# Patient Record
Sex: FEMALE | Race: WHITE | Employment: OTHER | URBAN - METROPOLITAN AREA
[De-identification: names, ages, dates, MRNs, and addresses within clinical notes are randomized per-mention and may not be internally consistent; named-entity substitution may affect disease eponyms.]

---

## 2017-01-24 ENCOUNTER — OFFICE VISIT (OUTPATIENT)
Dept: PRIMARY CARE CLINIC | Age: 67
End: 2017-01-24

## 2017-01-24 ENCOUNTER — CARE COORDINATOR VISIT (OUTPATIENT)
Dept: PRIMARY CARE CLINIC | Age: 67
End: 2017-01-24

## 2017-01-24 VITALS
RESPIRATION RATE: 16 BRPM | DIASTOLIC BLOOD PRESSURE: 60 MMHG | SYSTOLIC BLOOD PRESSURE: 126 MMHG | HEIGHT: 65 IN | WEIGHT: 149 LBS | HEART RATE: 87 BPM | OXYGEN SATURATION: 98 % | TEMPERATURE: 97.6 F | BODY MASS INDEX: 24.83 KG/M2

## 2017-01-24 DIAGNOSIS — B18.2 HEP C W/O COMA, CHRONIC (HCC): ICD-10-CM

## 2017-01-24 DIAGNOSIS — Z78.0 MENOPAUSE: ICD-10-CM

## 2017-01-24 DIAGNOSIS — E11.69 DM TYPE 2 WITH DIABETIC DYSLIPIDEMIA (HCC): Primary | ICD-10-CM

## 2017-01-24 DIAGNOSIS — D51.3 OTHER DIETARY VITAMIN B12 DEFICIENCY ANEMIA: ICD-10-CM

## 2017-01-24 DIAGNOSIS — R42 DIZZINESS: ICD-10-CM

## 2017-01-24 DIAGNOSIS — E78.5 DM TYPE 2 WITH DIABETIC DYSLIPIDEMIA (HCC): Primary | ICD-10-CM

## 2017-01-24 DIAGNOSIS — I10 HTN (HYPERTENSION), BENIGN: ICD-10-CM

## 2017-01-24 DIAGNOSIS — E78.2 MIXED HYPERLIPIDEMIA: ICD-10-CM

## 2017-01-24 DIAGNOSIS — E55.9 VITAMIN D DEFICIENCY: ICD-10-CM

## 2017-01-24 DIAGNOSIS — R41.3 MEMORY CHANGES: ICD-10-CM

## 2017-01-24 LAB
ALBUMIN SERPL-MCNC: 4.4 G/DL (ref 3.9–4.9)
ALP BLD-CCNC: 85 U/L (ref 40–130)
ALT SERPL-CCNC: 27 U/L (ref 0–33)
ANION GAP SERPL CALCULATED.3IONS-SCNC: 10 MEQ/L (ref 7–13)
AST SERPL-CCNC: 31 U/L (ref 0–35)
BILIRUB SERPL-MCNC: 0.2 MG/DL (ref 0–1.2)
BUN BLDV-MCNC: 15 MG/DL (ref 8–23)
CALCIUM SERPL-MCNC: 9.9 MG/DL (ref 8.6–10.2)
CHLORIDE BLD-SCNC: 100 MEQ/L (ref 98–107)
CO2: 28 MEQ/L (ref 22–29)
CREAT SERPL-MCNC: 0.44 MG/DL (ref 0.5–0.9)
GFR AFRICAN AMERICAN: >60
GFR NON-AFRICAN AMERICAN: >60
GLOBULIN: 1.8 G/DL (ref 2.3–3.5)
GLUCOSE BLD-MCNC: 142 MG/DL (ref 74–109)
GLUCOSE BLD-MCNC: 204 MG/DL
HEPATITIS C ANTIBODY INTERPRETATION: NORMAL
IRON SATURATION: 16 % (ref 11–46)
IRON: 76 UG/DL (ref 37–145)
POTASSIUM SERPL-SCNC: 4.1 MEQ/L (ref 3.5–5.1)
SODIUM BLD-SCNC: 138 MEQ/L (ref 132–144)
TOTAL IRON BINDING CAPACITY: 466 UG/DL (ref 178–450)
TOTAL PROTEIN: 6.2 G/DL (ref 6.4–8.1)
VITAMIN B-12: 1152 PG/ML (ref 211–946)
VITAMIN D 25-HYDROXY: 88.4 NG/ML (ref 30–100)

## 2017-01-24 PROCEDURE — 1090F PRES/ABSN URINE INCON ASSESS: CPT | Performed by: FAMILY MEDICINE

## 2017-01-24 PROCEDURE — G8399 PT W/DXA RESULTS DOCUMENT: HCPCS | Performed by: FAMILY MEDICINE

## 2017-01-24 PROCEDURE — 3017F COLORECTAL CA SCREEN DOC REV: CPT | Performed by: FAMILY MEDICINE

## 2017-01-24 PROCEDURE — 4040F PNEUMOC VAC/ADMIN/RCVD: CPT | Performed by: FAMILY MEDICINE

## 2017-01-24 PROCEDURE — 3014F SCREEN MAMMO DOC REV: CPT | Performed by: FAMILY MEDICINE

## 2017-01-24 PROCEDURE — 99214 OFFICE O/P EST MOD 30 MIN: CPT | Performed by: FAMILY MEDICINE

## 2017-01-24 PROCEDURE — G8427 DOCREV CUR MEDS BY ELIG CLIN: HCPCS | Performed by: FAMILY MEDICINE

## 2017-01-24 PROCEDURE — 82962 GLUCOSE BLOOD TEST: CPT | Performed by: FAMILY MEDICINE

## 2017-01-24 PROCEDURE — 1036F TOBACCO NON-USER: CPT | Performed by: FAMILY MEDICINE

## 2017-01-24 PROCEDURE — 1123F ACP DISCUSS/DSCN MKR DOCD: CPT | Performed by: FAMILY MEDICINE

## 2017-01-24 PROCEDURE — G8420 CALC BMI NORM PARAMETERS: HCPCS | Performed by: FAMILY MEDICINE

## 2017-01-24 PROCEDURE — G8484 FLU IMMUNIZE NO ADMIN: HCPCS | Performed by: FAMILY MEDICINE

## 2017-01-24 PROCEDURE — 3044F HG A1C LEVEL LT 7.0%: CPT | Performed by: FAMILY MEDICINE

## 2017-01-24 RX ORDER — DONEPEZIL HYDROCHLORIDE 5 MG/1
5 TABLET, FILM COATED ORAL NIGHTLY
Qty: 30 TABLET | Refills: 2 | Status: SHIPPED | OUTPATIENT
Start: 2017-01-24 | End: 2017-02-24

## 2017-01-24 ASSESSMENT — ENCOUNTER SYMPTOMS
ABDOMINAL PAIN: 0
BACK PAIN: 0

## 2017-02-04 ASSESSMENT — ENCOUNTER SYMPTOMS
CHEST TIGHTNESS: 0
SHORTNESS OF BREATH: 0
COUGH: 0

## 2017-02-24 ENCOUNTER — OFFICE VISIT (OUTPATIENT)
Dept: PRIMARY CARE CLINIC | Age: 67
End: 2017-02-24

## 2017-02-24 VITALS
BODY MASS INDEX: 24.36 KG/M2 | TEMPERATURE: 97.5 F | WEIGHT: 146.2 LBS | DIASTOLIC BLOOD PRESSURE: 64 MMHG | OXYGEN SATURATION: 95 % | SYSTOLIC BLOOD PRESSURE: 122 MMHG | HEIGHT: 65 IN | HEART RATE: 93 BPM | RESPIRATION RATE: 16 BRPM

## 2017-02-24 DIAGNOSIS — F34.1 DYSTHYMIA: ICD-10-CM

## 2017-02-24 DIAGNOSIS — E78.5 DM TYPE 2 WITH DIABETIC DYSLIPIDEMIA (HCC): ICD-10-CM

## 2017-02-24 DIAGNOSIS — R41.3 MEMORY CHANGES: ICD-10-CM

## 2017-02-24 DIAGNOSIS — I10 HTN (HYPERTENSION), BENIGN: Primary | ICD-10-CM

## 2017-02-24 DIAGNOSIS — E11.69 DM TYPE 2 WITH DIABETIC DYSLIPIDEMIA (HCC): ICD-10-CM

## 2017-02-24 LAB
BASOPHILS ABSOLUTE: 0.1 K/UL (ref 0–0.2)
BASOPHILS RELATIVE PERCENT: 1.1 %
EOSINOPHILS ABSOLUTE: 0.3 K/UL (ref 0–0.7)
EOSINOPHILS RELATIVE PERCENT: 2.9 %
HCT VFR BLD CALC: 38 % (ref 37–47)
HEMOGLOBIN: 12.3 G/DL (ref 12–16)
LYMPHOCYTES ABSOLUTE: 3 K/UL (ref 1–4.8)
LYMPHOCYTES RELATIVE PERCENT: 34.1 %
MCH RBC QN AUTO: 29.1 PG (ref 27–31.3)
MCHC RBC AUTO-ENTMCNC: 32.4 % (ref 33–37)
MCV RBC AUTO: 90 FL (ref 82–100)
MONOCYTES ABSOLUTE: 0.6 K/UL (ref 0.2–0.8)
MONOCYTES RELATIVE PERCENT: 6.5 %
NEUTROPHILS ABSOLUTE: 4.9 K/UL (ref 1.4–6.5)
NEUTROPHILS RELATIVE PERCENT: 55.4 %
PDW BLD-RTO: 14.1 % (ref 11.5–14.5)
PLATELET # BLD: 277 K/UL (ref 130–400)
RBC # BLD: 4.22 M/UL (ref 4.2–5.4)
WBC # BLD: 8.9 K/UL (ref 4.8–10.8)

## 2017-02-24 PROCEDURE — 4040F PNEUMOC VAC/ADMIN/RCVD: CPT | Performed by: FAMILY MEDICINE

## 2017-02-24 PROCEDURE — G8420 CALC BMI NORM PARAMETERS: HCPCS | Performed by: FAMILY MEDICINE

## 2017-02-24 PROCEDURE — 1123F ACP DISCUSS/DSCN MKR DOCD: CPT | Performed by: FAMILY MEDICINE

## 2017-02-24 PROCEDURE — 99214 OFFICE O/P EST MOD 30 MIN: CPT | Performed by: FAMILY MEDICINE

## 2017-02-24 PROCEDURE — 1090F PRES/ABSN URINE INCON ASSESS: CPT | Performed by: FAMILY MEDICINE

## 2017-02-24 PROCEDURE — G8427 DOCREV CUR MEDS BY ELIG CLIN: HCPCS | Performed by: FAMILY MEDICINE

## 2017-02-24 PROCEDURE — G8399 PT W/DXA RESULTS DOCUMENT: HCPCS | Performed by: FAMILY MEDICINE

## 2017-02-24 PROCEDURE — G8484 FLU IMMUNIZE NO ADMIN: HCPCS | Performed by: FAMILY MEDICINE

## 2017-02-24 PROCEDURE — 3017F COLORECTAL CA SCREEN DOC REV: CPT | Performed by: FAMILY MEDICINE

## 2017-02-24 PROCEDURE — 3014F SCREEN MAMMO DOC REV: CPT | Performed by: FAMILY MEDICINE

## 2017-02-24 PROCEDURE — 3044F HG A1C LEVEL LT 7.0%: CPT | Performed by: FAMILY MEDICINE

## 2017-02-24 PROCEDURE — 1036F TOBACCO NON-USER: CPT | Performed by: FAMILY MEDICINE

## 2017-02-24 RX ORDER — DONEPEZIL HYDROCHLORIDE 10 MG/1
10 TABLET, FILM COATED ORAL NIGHTLY
Qty: 30 TABLET | Refills: 1 | Status: SHIPPED | OUTPATIENT
Start: 2017-02-24 | End: 2017-03-10 | Stop reason: SDUPTHER

## 2017-02-24 ASSESSMENT — ENCOUNTER SYMPTOMS
ABDOMINAL PAIN: 0
SHORTNESS OF BREATH: 0
DIARRHEA: 0
CONSTIPATION: 0

## 2017-02-26 LAB — RPR: NORMAL

## 2017-03-02 ENCOUNTER — TELEPHONE (OUTPATIENT)
Dept: PRIMARY CARE CLINIC | Age: 67
End: 2017-03-02

## 2017-03-10 ENCOUNTER — OFFICE VISIT (OUTPATIENT)
Dept: PRIMARY CARE CLINIC | Age: 67
End: 2017-03-10

## 2017-03-10 ENCOUNTER — CARE COORDINATOR VISIT (OUTPATIENT)
Dept: CARE COORDINATION | Age: 67
End: 2017-03-10

## 2017-03-10 ENCOUNTER — OFFICE VISIT (OUTPATIENT)
Dept: BEHAVIORAL/MENTAL HEALTH | Age: 67
End: 2017-03-10

## 2017-03-10 VITALS
BODY MASS INDEX: 23.46 KG/M2 | DIASTOLIC BLOOD PRESSURE: 74 MMHG | OXYGEN SATURATION: 96 % | RESPIRATION RATE: 14 BRPM | WEIGHT: 146 LBS | HEART RATE: 97 BPM | TEMPERATURE: 98.1 F | SYSTOLIC BLOOD PRESSURE: 130 MMHG | HEIGHT: 66 IN

## 2017-03-10 DIAGNOSIS — D51.3 OTHER DIETARY VITAMIN B12 DEFICIENCY ANEMIA: Primary | ICD-10-CM

## 2017-03-10 DIAGNOSIS — Z78.0 MENOPAUSE: ICD-10-CM

## 2017-03-10 DIAGNOSIS — E78.2 MIXED HYPERLIPIDEMIA: ICD-10-CM

## 2017-03-10 DIAGNOSIS — B35.1 ONYCHOMYCOSIS: ICD-10-CM

## 2017-03-10 DIAGNOSIS — E78.5 DM TYPE 2 WITH DIABETIC DYSLIPIDEMIA (HCC): ICD-10-CM

## 2017-03-10 DIAGNOSIS — M15.9 PRIMARY OSTEOARTHRITIS INVOLVING MULTIPLE JOINTS: ICD-10-CM

## 2017-03-10 DIAGNOSIS — F34.1 DYSTHYMIA: Primary | ICD-10-CM

## 2017-03-10 DIAGNOSIS — R41.3 MEMORY CHANGES: ICD-10-CM

## 2017-03-10 DIAGNOSIS — F32.9 REACTIVE DEPRESSION: ICD-10-CM

## 2017-03-10 DIAGNOSIS — E11.69 DM TYPE 2 WITH DIABETIC DYSLIPIDEMIA (HCC): ICD-10-CM

## 2017-03-10 DIAGNOSIS — I10 ESSENTIAL HYPERTENSION: ICD-10-CM

## 2017-03-10 DIAGNOSIS — K21.9 GASTROESOPHAGEAL REFLUX DISEASE WITHOUT ESOPHAGITIS: ICD-10-CM

## 2017-03-10 DIAGNOSIS — J01.01 ACUTE RECURRENT MAXILLARY SINUSITIS: ICD-10-CM

## 2017-03-10 DIAGNOSIS — B00.9 HSV-1 INFECTION: ICD-10-CM

## 2017-03-10 PROCEDURE — 3017F COLORECTAL CA SCREEN DOC REV: CPT | Performed by: FAMILY MEDICINE

## 2017-03-10 PROCEDURE — 1090F PRES/ABSN URINE INCON ASSESS: CPT | Performed by: FAMILY MEDICINE

## 2017-03-10 PROCEDURE — G8427 DOCREV CUR MEDS BY ELIG CLIN: HCPCS | Performed by: FAMILY MEDICINE

## 2017-03-10 PROCEDURE — G8420 CALC BMI NORM PARAMETERS: HCPCS | Performed by: FAMILY MEDICINE

## 2017-03-10 PROCEDURE — 90791 PSYCH DIAGNOSTIC EVALUATION: CPT | Performed by: PSYCHOLOGIST

## 2017-03-10 PROCEDURE — 1123F ACP DISCUSS/DSCN MKR DOCD: CPT | Performed by: FAMILY MEDICINE

## 2017-03-10 PROCEDURE — 99214 OFFICE O/P EST MOD 30 MIN: CPT | Performed by: FAMILY MEDICINE

## 2017-03-10 PROCEDURE — G8399 PT W/DXA RESULTS DOCUMENT: HCPCS | Performed by: FAMILY MEDICINE

## 2017-03-10 PROCEDURE — 3014F SCREEN MAMMO DOC REV: CPT | Performed by: FAMILY MEDICINE

## 2017-03-10 PROCEDURE — 4040F PNEUMOC VAC/ADMIN/RCVD: CPT | Performed by: FAMILY MEDICINE

## 2017-03-10 PROCEDURE — 1036F TOBACCO NON-USER: CPT | Performed by: FAMILY MEDICINE

## 2017-03-10 PROCEDURE — G8484 FLU IMMUNIZE NO ADMIN: HCPCS | Performed by: FAMILY MEDICINE

## 2017-03-10 PROCEDURE — 96372 THER/PROPH/DIAG INJ SC/IM: CPT | Performed by: FAMILY MEDICINE

## 2017-03-10 PROCEDURE — 3044F HG A1C LEVEL LT 7.0%: CPT | Performed by: FAMILY MEDICINE

## 2017-03-10 RX ORDER — OMEPRAZOLE 20 MG/1
CAPSULE, DELAYED RELEASE ORAL
Qty: 90 CAPSULE | Refills: 1 | Status: SHIPPED | OUTPATIENT
Start: 2017-03-10 | End: 2018-03-19

## 2017-03-10 RX ORDER — SIMVASTATIN 40 MG
TABLET ORAL
Qty: 90 TABLET | Refills: 1 | Status: SHIPPED | OUTPATIENT
Start: 2017-03-10 | End: 2017-08-24 | Stop reason: SDUPTHER

## 2017-03-10 RX ORDER — BUPROPION HYDROCHLORIDE 150 MG/1
TABLET, EXTENDED RELEASE ORAL
Qty: 180 TABLET | Refills: 1 | Status: SHIPPED | OUTPATIENT
Start: 2017-03-10 | End: 2017-05-31

## 2017-03-10 RX ORDER — CYANOCOBALAMIN 1000 UG/ML
1000 INJECTION INTRAMUSCULAR; SUBCUTANEOUS ONCE
Status: COMPLETED | OUTPATIENT
Start: 2017-03-10 | End: 2017-03-10

## 2017-03-10 RX ORDER — AMOXICILLIN 500 MG/1
TABLET, FILM COATED ORAL
Qty: 30 TABLET | Refills: 0 | Status: SHIPPED | OUTPATIENT
Start: 2017-03-10 | End: 2017-03-21 | Stop reason: SDUPTHER

## 2017-03-10 RX ORDER — DONEPEZIL HYDROCHLORIDE 10 MG/1
10 TABLET, FILM COATED ORAL NIGHTLY
Qty: 30 TABLET | Refills: 1 | Status: SHIPPED | OUTPATIENT
Start: 2017-03-10 | End: 2017-03-24

## 2017-03-10 RX ORDER — LISINOPRIL 20 MG/1
TABLET ORAL
Qty: 90 TABLET | Refills: 1 | Status: SHIPPED | OUTPATIENT
Start: 2017-03-10 | End: 2017-07-07

## 2017-03-10 RX ORDER — ACYCLOVIR 400 MG/1
TABLET ORAL
Qty: 90 TABLET | Refills: 1 | Status: SHIPPED | OUTPATIENT
Start: 2017-03-10 | End: 2017-08-05 | Stop reason: SDUPTHER

## 2017-03-10 RX ORDER — PIOGLITAZONEHYDROCHLORIDE 30 MG/1
TABLET ORAL
Qty: 90 TABLET | Refills: 1 | Status: SHIPPED | OUTPATIENT
Start: 2017-03-10 | End: 2017-07-09 | Stop reason: SDUPTHER

## 2017-03-10 RX ORDER — METFORMIN HYDROCHLORIDE 500 MG/1
TABLET, EXTENDED RELEASE ORAL
Qty: 180 TABLET | Refills: 1 | Status: SHIPPED | OUTPATIENT
Start: 2017-03-10 | End: 2017-07-07

## 2017-03-10 RX ORDER — FLUCONAZOLE 100 MG/1
TABLET ORAL
Qty: 90 TABLET | Refills: 1 | Status: SHIPPED | OUTPATIENT
Start: 2017-03-10 | End: 2017-08-24 | Stop reason: SDUPTHER

## 2017-03-10 RX ORDER — FLUTICASONE PROPIONATE 50 MCG
SPRAY, SUSPENSION (ML) NASAL
Qty: 3 BOTTLE | Refills: 0 | Status: SHIPPED | OUTPATIENT
Start: 2017-03-10 | End: 2017-11-02 | Stop reason: SDUPTHER

## 2017-03-10 RX ORDER — DICLOFENAC SODIUM 75 MG/1
TABLET, DELAYED RELEASE ORAL
Qty: 180 TABLET | Refills: 1 | Status: SHIPPED | OUTPATIENT
Start: 2017-03-10 | End: 2017-05-12

## 2017-03-10 RX ORDER — OCTISALATE, AVOBENZONE, HOMOSALATE, AND OCTOCRYLENE 29.4; 29.4; 49; 25.48 MG/ML; MG/ML; MG/ML; MG/ML
1 LOTION TOPICAL DAILY
Qty: 30 CAPSULE | Refills: 3 | Status: SHIPPED | OUTPATIENT
Start: 2017-03-10 | End: 2017-05-12

## 2017-03-10 RX ADMIN — CYANOCOBALAMIN 1000 MCG: 1000 INJECTION INTRAMUSCULAR; SUBCUTANEOUS at 15:20

## 2017-03-10 ASSESSMENT — ENCOUNTER SYMPTOMS
SINUS PRESSURE: 1
RHINORRHEA: 1
CONSTIPATION: 0
SHORTNESS OF BREATH: 0
VOMITING: 0
COUGH: 0
DIARRHEA: 0
NAUSEA: 0
ABDOMINAL PAIN: 0

## 2017-03-10 ASSESSMENT — PATIENT HEALTH QUESTIONNAIRE - PHQ9
10. IF YOU CHECKED OFF ANY PROBLEMS, HOW DIFFICULT HAVE THESE PROBLEMS MADE IT FOR YOU TO DO YOUR WORK, TAKE CARE OF THINGS AT HOME, OR GET ALONG WITH OTHER PEOPLE: 1
2. FEELING DOWN, DEPRESSED OR HOPELESS: 1
SUM OF ALL RESPONSES TO PHQ QUESTIONS 1-9: 8
3. TROUBLE FALLING OR STAYING ASLEEP: 0
5. POOR APPETITE OR OVEREATING: 1
4. FEELING TIRED OR HAVING LITTLE ENERGY: 3
7. TROUBLE CONCENTRATING ON THINGS, SUCH AS READING THE NEWSPAPER OR WATCHING TELEVISION: 1
SUM OF ALL RESPONSES TO PHQ9 QUESTIONS 1 & 2: 2
8. MOVING OR SPEAKING SO SLOWLY THAT OTHER PEOPLE COULD HAVE NOTICED. OR THE OPPOSITE, BEING SO FIGETY OR RESTLESS THAT YOU HAVE BEEN MOVING AROUND A LOT MORE THAN USUAL: 0
1. LITTLE INTEREST OR PLEASURE IN DOING THINGS: 1
6. FEELING BAD ABOUT YOURSELF - OR THAT YOU ARE A FAILURE OR HAVE LET YOURSELF OR YOUR FAMILY DOWN: 1
9. THOUGHTS THAT YOU WOULD BE BETTER OFF DEAD, OR OF HURTING YOURSELF: 0

## 2017-03-14 ENCOUNTER — TELEPHONE (OUTPATIENT)
Dept: INTERNAL MEDICINE | Age: 67
End: 2017-03-14

## 2017-03-17 ENCOUNTER — CARE COORDINATION (OUTPATIENT)
Dept: CARE COORDINATION | Age: 67
End: 2017-03-17

## 2017-03-21 DIAGNOSIS — B35.1 ONYCHOMYCOSIS: ICD-10-CM

## 2017-03-21 RX ORDER — AMOXICILLIN 500 MG/1
TABLET, FILM COATED ORAL
Qty: 30 TABLET | Refills: 0 | Status: SHIPPED | OUTPATIENT
Start: 2017-03-21 | End: 2017-05-12

## 2017-03-24 ENCOUNTER — OFFICE VISIT (OUTPATIENT)
Dept: PRIMARY CARE CLINIC | Age: 67
End: 2017-03-24

## 2017-03-24 VITALS
RESPIRATION RATE: 16 BRPM | OXYGEN SATURATION: 95 % | SYSTOLIC BLOOD PRESSURE: 118 MMHG | BODY MASS INDEX: 24.03 KG/M2 | WEIGHT: 144.2 LBS | DIASTOLIC BLOOD PRESSURE: 62 MMHG | TEMPERATURE: 98.2 F | HEIGHT: 65 IN | HEART RATE: 95 BPM

## 2017-03-24 DIAGNOSIS — F34.1 DYSTHYMIA: ICD-10-CM

## 2017-03-24 DIAGNOSIS — E11.69 DM TYPE 2 WITH DIABETIC DYSLIPIDEMIA (HCC): ICD-10-CM

## 2017-03-24 DIAGNOSIS — E78.5 DM TYPE 2 WITH DIABETIC DYSLIPIDEMIA (HCC): ICD-10-CM

## 2017-03-24 DIAGNOSIS — I10 HTN (HYPERTENSION), BENIGN: ICD-10-CM

## 2017-03-24 DIAGNOSIS — F02.83 DEMENTIA OF THE ALZHEIMER'S TYPE, WITH EARLY ONSET, WITH DEPRESSIVE MOOD (HCC): Primary | ICD-10-CM

## 2017-03-24 DIAGNOSIS — J30.1 ALLERGIC RHINITIS DUE TO POLLEN, UNSPECIFIED RHINITIS SEASONALITY: ICD-10-CM

## 2017-03-24 DIAGNOSIS — G30.0 DEMENTIA OF THE ALZHEIMER'S TYPE, WITH EARLY ONSET, WITH DEPRESSIVE MOOD (HCC): Primary | ICD-10-CM

## 2017-03-24 PROCEDURE — G8420 CALC BMI NORM PARAMETERS: HCPCS | Performed by: FAMILY MEDICINE

## 2017-03-24 PROCEDURE — 3017F COLORECTAL CA SCREEN DOC REV: CPT | Performed by: FAMILY MEDICINE

## 2017-03-24 PROCEDURE — G8427 DOCREV CUR MEDS BY ELIG CLIN: HCPCS | Performed by: FAMILY MEDICINE

## 2017-03-24 PROCEDURE — 4040F PNEUMOC VAC/ADMIN/RCVD: CPT | Performed by: FAMILY MEDICINE

## 2017-03-24 PROCEDURE — 1090F PRES/ABSN URINE INCON ASSESS: CPT | Performed by: FAMILY MEDICINE

## 2017-03-24 PROCEDURE — 1036F TOBACCO NON-USER: CPT | Performed by: FAMILY MEDICINE

## 2017-03-24 PROCEDURE — G8399 PT W/DXA RESULTS DOCUMENT: HCPCS | Performed by: FAMILY MEDICINE

## 2017-03-24 PROCEDURE — 3014F SCREEN MAMMO DOC REV: CPT | Performed by: FAMILY MEDICINE

## 2017-03-24 PROCEDURE — 3044F HG A1C LEVEL LT 7.0%: CPT | Performed by: FAMILY MEDICINE

## 2017-03-24 PROCEDURE — 1123F ACP DISCUSS/DSCN MKR DOCD: CPT | Performed by: FAMILY MEDICINE

## 2017-03-24 PROCEDURE — G8484 FLU IMMUNIZE NO ADMIN: HCPCS | Performed by: FAMILY MEDICINE

## 2017-03-24 PROCEDURE — 99214 OFFICE O/P EST MOD 30 MIN: CPT | Performed by: FAMILY MEDICINE

## 2017-03-24 ASSESSMENT — ENCOUNTER SYMPTOMS: BLURRED VISION: 0

## 2017-03-31 ENCOUNTER — TELEPHONE (OUTPATIENT)
Dept: PRIMARY CARE CLINIC | Age: 67
End: 2017-03-31

## 2017-03-31 ENCOUNTER — CARE COORDINATION (OUTPATIENT)
Dept: CARE COORDINATION | Age: 67
End: 2017-03-31

## 2017-04-01 ENCOUNTER — OFFICE VISIT (OUTPATIENT)
Dept: PRIMARY CARE CLINIC | Age: 67
End: 2017-04-01

## 2017-04-01 VITALS
WEIGHT: 142 LBS | RESPIRATION RATE: 16 BRPM | HEIGHT: 65 IN | DIASTOLIC BLOOD PRESSURE: 62 MMHG | OXYGEN SATURATION: 97 % | HEART RATE: 105 BPM | BODY MASS INDEX: 23.66 KG/M2 | TEMPERATURE: 98.1 F | SYSTOLIC BLOOD PRESSURE: 128 MMHG

## 2017-04-01 DIAGNOSIS — A05.9 FOOD POISONING: ICD-10-CM

## 2017-04-01 DIAGNOSIS — R11.2 NAUSEA AND VOMITING, INTRACTABILITY OF VOMITING NOT SPECIFIED, UNSPECIFIED VOMITING TYPE: Primary | ICD-10-CM

## 2017-04-01 PROCEDURE — 99213 OFFICE O/P EST LOW 20 MIN: CPT | Performed by: INTERNAL MEDICINE

## 2017-04-01 PROCEDURE — G8420 CALC BMI NORM PARAMETERS: HCPCS | Performed by: INTERNAL MEDICINE

## 2017-04-01 PROCEDURE — 1123F ACP DISCUSS/DSCN MKR DOCD: CPT | Performed by: INTERNAL MEDICINE

## 2017-04-01 PROCEDURE — 1090F PRES/ABSN URINE INCON ASSESS: CPT | Performed by: INTERNAL MEDICINE

## 2017-04-01 PROCEDURE — 4040F PNEUMOC VAC/ADMIN/RCVD: CPT | Performed by: INTERNAL MEDICINE

## 2017-04-01 PROCEDURE — G8399 PT W/DXA RESULTS DOCUMENT: HCPCS | Performed by: INTERNAL MEDICINE

## 2017-04-01 PROCEDURE — 1036F TOBACCO NON-USER: CPT | Performed by: INTERNAL MEDICINE

## 2017-04-01 PROCEDURE — G8427 DOCREV CUR MEDS BY ELIG CLIN: HCPCS | Performed by: INTERNAL MEDICINE

## 2017-04-01 PROCEDURE — 3017F COLORECTAL CA SCREEN DOC REV: CPT | Performed by: INTERNAL MEDICINE

## 2017-04-01 PROCEDURE — 3014F SCREEN MAMMO DOC REV: CPT | Performed by: INTERNAL MEDICINE

## 2017-04-01 RX ORDER — ONDANSETRON 4 MG/1
4 TABLET, ORALLY DISINTEGRATING ORAL EVERY 8 HOURS PRN
Qty: 12 TABLET | Refills: 1 | Status: SHIPPED | OUTPATIENT
Start: 2017-04-01 | End: 2017-07-07

## 2017-04-01 RX ORDER — ONDANSETRON 4 MG/1
4 TABLET, ORALLY DISINTEGRATING ORAL EVERY 8 HOURS PRN
Qty: 12 TABLET | Refills: 1 | Status: SHIPPED | OUTPATIENT
Start: 2017-04-01 | End: 2017-04-01 | Stop reason: SDUPTHER

## 2017-04-02 PROBLEM — G30.0: Status: ACTIVE | Noted: 2017-04-02

## 2017-04-02 PROBLEM — F02.83: Status: ACTIVE | Noted: 2017-04-02

## 2017-04-02 ASSESSMENT — ENCOUNTER SYMPTOMS
FACIAL SWELLING: 0
BLOOD IN STOOL: 0
ABDOMINAL DISTENTION: 0
APNEA: 0
CHOKING: 0
SHORTNESS OF BREATH: 0
CHEST TIGHTNESS: 0
VOMITING: 1
DIARRHEA: 1
NAUSEA: 1
PHOTOPHOBIA: 0

## 2017-04-21 ENCOUNTER — CARE COORDINATOR VISIT (OUTPATIENT)
Dept: CARE COORDINATION | Age: 67
End: 2017-04-21

## 2017-04-21 ENCOUNTER — OFFICE VISIT (OUTPATIENT)
Dept: PRIMARY CARE CLINIC | Age: 67
End: 2017-04-21

## 2017-04-21 VITALS
HEART RATE: 96 BPM | BODY MASS INDEX: 23.97 KG/M2 | WEIGHT: 143.9 LBS | TEMPERATURE: 97.2 F | RESPIRATION RATE: 14 BRPM | SYSTOLIC BLOOD PRESSURE: 104 MMHG | DIASTOLIC BLOOD PRESSURE: 64 MMHG | HEIGHT: 65 IN

## 2017-04-21 DIAGNOSIS — I10 HTN (HYPERTENSION), BENIGN: Primary | ICD-10-CM

## 2017-04-21 DIAGNOSIS — Z78.0 MENOPAUSE: ICD-10-CM

## 2017-04-21 DIAGNOSIS — G30.0 DEMENTIA OF THE ALZHEIMER'S TYPE, WITH EARLY ONSET, WITH DEPRESSIVE MOOD (HCC): ICD-10-CM

## 2017-04-21 DIAGNOSIS — F02.83 DEMENTIA OF THE ALZHEIMER'S TYPE, WITH EARLY ONSET, WITH DEPRESSIVE MOOD (HCC): ICD-10-CM

## 2017-04-21 PROCEDURE — 1090F PRES/ABSN URINE INCON ASSESS: CPT | Performed by: FAMILY MEDICINE

## 2017-04-21 PROCEDURE — 4040F PNEUMOC VAC/ADMIN/RCVD: CPT | Performed by: FAMILY MEDICINE

## 2017-04-21 PROCEDURE — 1123F ACP DISCUSS/DSCN MKR DOCD: CPT | Performed by: FAMILY MEDICINE

## 2017-04-21 PROCEDURE — 99214 OFFICE O/P EST MOD 30 MIN: CPT | Performed by: FAMILY MEDICINE

## 2017-04-21 PROCEDURE — 1036F TOBACCO NON-USER: CPT | Performed by: FAMILY MEDICINE

## 2017-04-21 PROCEDURE — G8420 CALC BMI NORM PARAMETERS: HCPCS | Performed by: FAMILY MEDICINE

## 2017-04-21 PROCEDURE — 3017F COLORECTAL CA SCREEN DOC REV: CPT | Performed by: FAMILY MEDICINE

## 2017-04-21 PROCEDURE — 3014F SCREEN MAMMO DOC REV: CPT | Performed by: FAMILY MEDICINE

## 2017-04-21 PROCEDURE — G8427 DOCREV CUR MEDS BY ELIG CLIN: HCPCS | Performed by: FAMILY MEDICINE

## 2017-04-21 PROCEDURE — G8399 PT W/DXA RESULTS DOCUMENT: HCPCS | Performed by: FAMILY MEDICINE

## 2017-04-21 RX ORDER — DONEPEZIL HYDROCHLORIDE 23 MG/1
23 TABLET, FILM COATED ORAL NIGHTLY
Qty: 30 TABLET | Refills: 3 | Status: SHIPPED | OUTPATIENT
Start: 2017-04-21 | End: 2017-05-12

## 2017-04-21 ASSESSMENT — ENCOUNTER SYMPTOMS
WHEEZING: 0
BACK PAIN: 0
SORE THROAT: 0
DIARRHEA: 1
SHORTNESS OF BREATH: 0
SWOLLEN GLANDS: 0
CHANGE IN BOWEL HABIT: 0
COUGH: 0
NAUSEA: 0
VOMITING: 0
ABDOMINAL PAIN: 1
SINUS PRESSURE: 0
VISUAL CHANGE: 0
CONSTIPATION: 0
RESPIRATORY NEGATIVE: 1

## 2017-04-24 ENCOUNTER — CARE COORDINATION (OUTPATIENT)
Dept: CARE COORDINATION | Age: 67
End: 2017-04-24

## 2017-04-24 RX ORDER — DONEPEZIL HYDROCHLORIDE 10 MG/1
20 TABLET, FILM COATED ORAL NIGHTLY
Qty: 60 TABLET | Refills: 3 | Status: SHIPPED | OUTPATIENT
Start: 2017-04-24 | End: 2017-08-24 | Stop reason: SDUPTHER

## 2017-04-26 ENCOUNTER — TELEPHONE (OUTPATIENT)
Dept: PRIMARY CARE CLINIC | Age: 67
End: 2017-04-26

## 2017-04-26 RX ORDER — CIPROFLOXACIN 500 MG/1
TABLET, FILM COATED ORAL
Qty: 20 TABLET | Refills: 0 | Status: SHIPPED | OUTPATIENT
Start: 2017-04-26 | End: 2017-05-06

## 2017-05-12 ENCOUNTER — OFFICE VISIT (OUTPATIENT)
Dept: PRIMARY CARE CLINIC | Age: 67
End: 2017-05-12

## 2017-05-12 ENCOUNTER — CARE COORDINATOR VISIT (OUTPATIENT)
Dept: CARE COORDINATION | Age: 67
End: 2017-05-12

## 2017-05-12 VITALS
DIASTOLIC BLOOD PRESSURE: 70 MMHG | WEIGHT: 139.9 LBS | BODY MASS INDEX: 23.31 KG/M2 | HEART RATE: 74 BPM | SYSTOLIC BLOOD PRESSURE: 128 MMHG | HEIGHT: 65 IN | RESPIRATION RATE: 14 BRPM | TEMPERATURE: 97.6 F

## 2017-05-12 DIAGNOSIS — F34.1 DYSTHYMIA: ICD-10-CM

## 2017-05-12 DIAGNOSIS — E78.5 DM TYPE 2 WITH DIABETIC DYSLIPIDEMIA (HCC): ICD-10-CM

## 2017-05-12 DIAGNOSIS — E78.5 DM TYPE 2 WITH DIABETIC DYSLIPIDEMIA (HCC): Primary | ICD-10-CM

## 2017-05-12 DIAGNOSIS — R41.3 MEMORY CHANGES: ICD-10-CM

## 2017-05-12 DIAGNOSIS — I10 HTN (HYPERTENSION), BENIGN: ICD-10-CM

## 2017-05-12 DIAGNOSIS — E11.69 DM TYPE 2 WITH DIABETIC DYSLIPIDEMIA (HCC): ICD-10-CM

## 2017-05-12 DIAGNOSIS — E11.69 DM TYPE 2 WITH DIABETIC DYSLIPIDEMIA (HCC): Primary | ICD-10-CM

## 2017-05-12 DIAGNOSIS — M15.9 PRIMARY OSTEOARTHRITIS INVOLVING MULTIPLE JOINTS: ICD-10-CM

## 2017-05-12 DIAGNOSIS — J30.1 ALLERGIC RHINITIS DUE TO POLLEN, UNSPECIFIED RHINITIS SEASONALITY: ICD-10-CM

## 2017-05-12 LAB
CREATININE URINE: 71.9 MG/DL
MICROALBUMIN UR-MCNC: 15.2 MG/DL
MICROALBUMIN/CREAT UR-RTO: 211.4 MG/G (ref 0–30)

## 2017-05-12 PROCEDURE — 1036F TOBACCO NON-USER: CPT | Performed by: FAMILY MEDICINE

## 2017-05-12 PROCEDURE — 3014F SCREEN MAMMO DOC REV: CPT | Performed by: FAMILY MEDICINE

## 2017-05-12 PROCEDURE — 3044F HG A1C LEVEL LT 7.0%: CPT | Performed by: FAMILY MEDICINE

## 2017-05-12 PROCEDURE — 1123F ACP DISCUSS/DSCN MKR DOCD: CPT | Performed by: FAMILY MEDICINE

## 2017-05-12 PROCEDURE — 4040F PNEUMOC VAC/ADMIN/RCVD: CPT | Performed by: FAMILY MEDICINE

## 2017-05-12 PROCEDURE — G8399 PT W/DXA RESULTS DOCUMENT: HCPCS | Performed by: FAMILY MEDICINE

## 2017-05-12 PROCEDURE — 3017F COLORECTAL CA SCREEN DOC REV: CPT | Performed by: FAMILY MEDICINE

## 2017-05-12 PROCEDURE — G8420 CALC BMI NORM PARAMETERS: HCPCS | Performed by: FAMILY MEDICINE

## 2017-05-12 PROCEDURE — G8427 DOCREV CUR MEDS BY ELIG CLIN: HCPCS | Performed by: FAMILY MEDICINE

## 2017-05-12 PROCEDURE — 1090F PRES/ABSN URINE INCON ASSESS: CPT | Performed by: FAMILY MEDICINE

## 2017-05-12 PROCEDURE — 99214 OFFICE O/P EST MOD 30 MIN: CPT | Performed by: FAMILY MEDICINE

## 2017-05-12 RX ORDER — MEMANTINE HYDROCHLORIDE 7 MG/1
7 CAPSULE, EXTENDED RELEASE ORAL DAILY
Qty: 30 CAPSULE | Refills: 1 | Status: SHIPPED | OUTPATIENT
Start: 2017-05-12 | End: 2017-05-31

## 2017-05-12 RX ORDER — CELECOXIB 100 MG/1
100 CAPSULE ORAL 2 TIMES DAILY
Qty: 60 CAPSULE | Refills: 3
Start: 2017-05-12 | End: 2018-03-19

## 2017-05-12 ASSESSMENT — ENCOUNTER SYMPTOMS
CONSTIPATION: 0
ORTHOPNEA: 0
SHORTNESS OF BREATH: 0
COUGH: 0
RESPIRATORY NEGATIVE: 1
WHEEZING: 0
BACK PAIN: 0
VOMITING: 0
DIARRHEA: 0
SINUS PRESSURE: 0
SORE THROAT: 0
NAUSEA: 0
ABDOMINAL PAIN: 0
BLURRED VISION: 0

## 2017-05-26 ENCOUNTER — CARE COORDINATION (OUTPATIENT)
Dept: PRIMARY CARE CLINIC | Age: 67
End: 2017-05-26

## 2017-05-31 ENCOUNTER — OFFICE VISIT (OUTPATIENT)
Dept: PRIMARY CARE CLINIC | Age: 67
End: 2017-05-31

## 2017-05-31 ENCOUNTER — CARE COORDINATOR VISIT (OUTPATIENT)
Dept: PRIMARY CARE CLINIC | Age: 67
End: 2017-05-31

## 2017-05-31 VITALS
HEIGHT: 65 IN | TEMPERATURE: 98 F | SYSTOLIC BLOOD PRESSURE: 114 MMHG | DIASTOLIC BLOOD PRESSURE: 66 MMHG | RESPIRATION RATE: 16 BRPM | OXYGEN SATURATION: 97 % | BODY MASS INDEX: 23.16 KG/M2 | WEIGHT: 139 LBS | HEART RATE: 87 BPM

## 2017-05-31 DIAGNOSIS — E11.69 DM TYPE 2 WITH DIABETIC DYSLIPIDEMIA (HCC): ICD-10-CM

## 2017-05-31 DIAGNOSIS — E78.5 DM TYPE 2 WITH DIABETIC DYSLIPIDEMIA (HCC): ICD-10-CM

## 2017-05-31 DIAGNOSIS — F32.0 MILD SINGLE CURRENT EPISODE OF MAJOR DEPRESSIVE DISORDER (HCC): ICD-10-CM

## 2017-05-31 DIAGNOSIS — I10 HTN (HYPERTENSION), BENIGN: Primary | ICD-10-CM

## 2017-05-31 DIAGNOSIS — R41.3 MEMORY CHANGES: ICD-10-CM

## 2017-05-31 DIAGNOSIS — F34.1 DYSTHYMIA: ICD-10-CM

## 2017-05-31 DIAGNOSIS — I65.23 CAROTID STENOSIS, BILATERAL: ICD-10-CM

## 2017-05-31 LAB — HBA1C MFR BLD: 6.7 % (ref 4.8–5.9)

## 2017-05-31 PROCEDURE — G8427 DOCREV CUR MEDS BY ELIG CLIN: HCPCS | Performed by: FAMILY MEDICINE

## 2017-05-31 PROCEDURE — 1036F TOBACCO NON-USER: CPT | Performed by: FAMILY MEDICINE

## 2017-05-31 PROCEDURE — G8420 CALC BMI NORM PARAMETERS: HCPCS | Performed by: FAMILY MEDICINE

## 2017-05-31 PROCEDURE — 1123F ACP DISCUSS/DSCN MKR DOCD: CPT | Performed by: FAMILY MEDICINE

## 2017-05-31 PROCEDURE — 4040F PNEUMOC VAC/ADMIN/RCVD: CPT | Performed by: FAMILY MEDICINE

## 2017-05-31 PROCEDURE — G8599 NO ASA/ANTIPLAT THER USE RNG: HCPCS | Performed by: FAMILY MEDICINE

## 2017-05-31 PROCEDURE — 3014F SCREEN MAMMO DOC REV: CPT | Performed by: FAMILY MEDICINE

## 2017-05-31 PROCEDURE — 3017F COLORECTAL CA SCREEN DOC REV: CPT | Performed by: FAMILY MEDICINE

## 2017-05-31 PROCEDURE — 99214 OFFICE O/P EST MOD 30 MIN: CPT | Performed by: FAMILY MEDICINE

## 2017-05-31 PROCEDURE — G8399 PT W/DXA RESULTS DOCUMENT: HCPCS | Performed by: FAMILY MEDICINE

## 2017-05-31 PROCEDURE — 1090F PRES/ABSN URINE INCON ASSESS: CPT | Performed by: FAMILY MEDICINE

## 2017-05-31 PROCEDURE — 3046F HEMOGLOBIN A1C LEVEL >9.0%: CPT | Performed by: FAMILY MEDICINE

## 2017-05-31 RX ORDER — BUPROPION HYDROCHLORIDE 200 MG/1
200 TABLET, EXTENDED RELEASE ORAL 2 TIMES DAILY
Qty: 60 TABLET | Refills: 3 | Status: SHIPPED | OUTPATIENT
Start: 2017-05-31 | End: 2017-06-15 | Stop reason: SDUPTHER

## 2017-05-31 ASSESSMENT — ENCOUNTER SYMPTOMS
SORE THROAT: 0
SHORTNESS OF BREATH: 0
ABDOMINAL PAIN: 0
COUGH: 0
SINUS PRESSURE: 0
BACK PAIN: 0
WHEEZING: 0
CONSTIPATION: 0
RESPIRATORY NEGATIVE: 1
NAUSEA: 0
VOMITING: 0
DIARRHEA: 0

## 2017-06-08 ENCOUNTER — OFFICE VISIT (OUTPATIENT)
Dept: BEHAVIORAL/MENTAL HEALTH | Age: 67
End: 2017-06-08

## 2017-06-08 DIAGNOSIS — F34.1 DYSTHYMIA: Primary | ICD-10-CM

## 2017-06-08 PROBLEM — F41.9 ANXIETY: Status: ACTIVE | Noted: 2017-06-08

## 2017-06-08 PROCEDURE — 90834 PSYTX W PT 45 MINUTES: CPT | Performed by: PSYCHOLOGIST

## 2017-06-08 ASSESSMENT — PATIENT HEALTH QUESTIONNAIRE - PHQ9
5. POOR APPETITE OR OVEREATING: 2
SUM OF ALL RESPONSES TO PHQ9 QUESTIONS 1 & 2: 3
9. THOUGHTS THAT YOU WOULD BE BETTER OFF DEAD, OR OF HURTING YOURSELF: 0
10. IF YOU CHECKED OFF ANY PROBLEMS, HOW DIFFICULT HAVE THESE PROBLEMS MADE IT FOR YOU TO DO YOUR WORK, TAKE CARE OF THINGS AT HOME, OR GET ALONG WITH OTHER PEOPLE: 1
7. TROUBLE CONCENTRATING ON THINGS, SUCH AS READING THE NEWSPAPER OR WATCHING TELEVISION: 0
6. FEELING BAD ABOUT YOURSELF - OR THAT YOU ARE A FAILURE OR HAVE LET YOURSELF OR YOUR FAMILY DOWN: 1
SUM OF ALL RESPONSES TO PHQ QUESTIONS 1-9: 7
8. MOVING OR SPEAKING SO SLOWLY THAT OTHER PEOPLE COULD HAVE NOTICED. OR THE OPPOSITE, BEING SO FIGETY OR RESTLESS THAT YOU HAVE BEEN MOVING AROUND A LOT MORE THAN USUAL: 0
2. FEELING DOWN, DEPRESSED OR HOPELESS: 1
1. LITTLE INTEREST OR PLEASURE IN DOING THINGS: 2
4. FEELING TIRED OR HAVING LITTLE ENERGY: 1

## 2017-06-14 ENCOUNTER — CARE COORDINATION (OUTPATIENT)
Dept: CARE COORDINATION | Age: 67
End: 2017-06-14

## 2017-06-15 ENCOUNTER — OFFICE VISIT (OUTPATIENT)
Dept: PRIMARY CARE CLINIC | Age: 67
End: 2017-06-15

## 2017-06-15 ENCOUNTER — CARE COORDINATOR VISIT (OUTPATIENT)
Dept: CARE COORDINATION | Age: 67
End: 2017-06-15

## 2017-06-15 VITALS
DIASTOLIC BLOOD PRESSURE: 62 MMHG | SYSTOLIC BLOOD PRESSURE: 128 MMHG | OXYGEN SATURATION: 96 % | RESPIRATION RATE: 16 BRPM | WEIGHT: 137 LBS | HEART RATE: 82 BPM | BODY MASS INDEX: 22.82 KG/M2 | HEIGHT: 65 IN | TEMPERATURE: 97.6 F

## 2017-06-15 DIAGNOSIS — F32.0 MILD SINGLE CURRENT EPISODE OF MAJOR DEPRESSIVE DISORDER (HCC): ICD-10-CM

## 2017-06-15 DIAGNOSIS — M15.9 PRIMARY OSTEOARTHRITIS INVOLVING MULTIPLE JOINTS: ICD-10-CM

## 2017-06-15 DIAGNOSIS — R41.3 MEMORY CHANGES: ICD-10-CM

## 2017-06-15 DIAGNOSIS — I10 HTN (HYPERTENSION), BENIGN: Primary | ICD-10-CM

## 2017-06-15 DIAGNOSIS — J30.1 ALLERGIC RHINITIS DUE TO POLLEN, UNSPECIFIED RHINITIS SEASONALITY: ICD-10-CM

## 2017-06-15 PROCEDURE — 99214 OFFICE O/P EST MOD 30 MIN: CPT | Performed by: FAMILY MEDICINE

## 2017-06-15 PROCEDURE — 1123F ACP DISCUSS/DSCN MKR DOCD: CPT | Performed by: FAMILY MEDICINE

## 2017-06-15 PROCEDURE — 3014F SCREEN MAMMO DOC REV: CPT | Performed by: FAMILY MEDICINE

## 2017-06-15 PROCEDURE — G8427 DOCREV CUR MEDS BY ELIG CLIN: HCPCS | Performed by: FAMILY MEDICINE

## 2017-06-15 PROCEDURE — 1090F PRES/ABSN URINE INCON ASSESS: CPT | Performed by: FAMILY MEDICINE

## 2017-06-15 PROCEDURE — G8599 NO ASA/ANTIPLAT THER USE RNG: HCPCS | Performed by: FAMILY MEDICINE

## 2017-06-15 PROCEDURE — 4040F PNEUMOC VAC/ADMIN/RCVD: CPT | Performed by: FAMILY MEDICINE

## 2017-06-15 PROCEDURE — G8399 PT W/DXA RESULTS DOCUMENT: HCPCS | Performed by: FAMILY MEDICINE

## 2017-06-15 PROCEDURE — G8420 CALC BMI NORM PARAMETERS: HCPCS | Performed by: FAMILY MEDICINE

## 2017-06-15 PROCEDURE — 3017F COLORECTAL CA SCREEN DOC REV: CPT | Performed by: FAMILY MEDICINE

## 2017-06-15 PROCEDURE — 1036F TOBACCO NON-USER: CPT | Performed by: FAMILY MEDICINE

## 2017-06-15 RX ORDER — CITALOPRAM 10 MG/1
10 TABLET ORAL DAILY
Qty: 30 TABLET | Refills: 0 | Status: SHIPPED | OUTPATIENT
Start: 2017-06-15 | End: 2017-07-07

## 2017-06-15 RX ORDER — DICLOFENAC SODIUM 75 MG/1
TABLET, DELAYED RELEASE ORAL
Qty: 180 TABLET | Refills: 1 | Status: SHIPPED | OUTPATIENT
Start: 2017-06-15 | End: 2017-11-02 | Stop reason: SDUPTHER

## 2017-06-15 RX ORDER — BUPROPION HYDROCHLORIDE 200 MG/1
200 TABLET, EXTENDED RELEASE ORAL 2 TIMES DAILY
Qty: 60 TABLET | Refills: 3 | Status: SHIPPED | OUTPATIENT
Start: 2017-06-15 | End: 2017-08-24 | Stop reason: DRUGHIGH

## 2017-06-15 ASSESSMENT — ENCOUNTER SYMPTOMS
NAUSEA: 0
SORE THROAT: 0
DIARRHEA: 1
ABDOMINAL PAIN: 0
WHEEZING: 0
SHORTNESS OF BREATH: 0
SINUS PRESSURE: 0
BACK PAIN: 0
COUGH: 0
CONSTIPATION: 0
VOMITING: 0
RESPIRATORY NEGATIVE: 1

## 2017-06-16 DIAGNOSIS — F32.0 MILD SINGLE CURRENT EPISODE OF MAJOR DEPRESSIVE DISORDER (HCC): ICD-10-CM

## 2017-06-16 DIAGNOSIS — F41.9 ANXIETY: Primary | ICD-10-CM

## 2017-06-19 ENCOUNTER — CARE COORDINATION (OUTPATIENT)
Dept: CARE COORDINATION | Age: 67
End: 2017-06-19

## 2017-06-22 ENCOUNTER — OFFICE VISIT (OUTPATIENT)
Dept: BEHAVIORAL/MENTAL HEALTH | Age: 67
End: 2017-06-22

## 2017-06-22 DIAGNOSIS — F34.1 DYSTHYMIA: Primary | ICD-10-CM

## 2017-06-22 PROCEDURE — 90832 PSYTX W PT 30 MINUTES: CPT | Performed by: PSYCHOLOGIST

## 2017-06-29 PROBLEM — M15.9 OSTEOARTHRITIS OF MULTIPLE JOINTS: Status: ACTIVE | Noted: 2017-06-29

## 2017-07-03 ENCOUNTER — CARE COORDINATION (OUTPATIENT)
Dept: CARE COORDINATION | Age: 67
End: 2017-07-03

## 2017-07-03 ENCOUNTER — TELEPHONE (OUTPATIENT)
Dept: PRIMARY CARE CLINIC | Age: 67
End: 2017-07-03

## 2017-07-06 ENCOUNTER — CARE COORDINATION (OUTPATIENT)
Dept: CARE COORDINATION | Age: 67
End: 2017-07-06

## 2017-07-07 ENCOUNTER — OFFICE VISIT (OUTPATIENT)
Dept: PRIMARY CARE CLINIC | Age: 67
End: 2017-07-07

## 2017-07-07 ENCOUNTER — CARE COORDINATOR VISIT (OUTPATIENT)
Dept: CARE COORDINATION | Age: 67
End: 2017-07-07

## 2017-07-07 VITALS
BODY MASS INDEX: 22.33 KG/M2 | HEART RATE: 74 BPM | OXYGEN SATURATION: 96 % | DIASTOLIC BLOOD PRESSURE: 54 MMHG | SYSTOLIC BLOOD PRESSURE: 100 MMHG | WEIGHT: 134 LBS | RESPIRATION RATE: 16 BRPM | HEIGHT: 65 IN | TEMPERATURE: 98.2 F

## 2017-07-07 DIAGNOSIS — K90.9 DIARRHEA DUE TO MALABSORPTION: ICD-10-CM

## 2017-07-07 DIAGNOSIS — I10 HTN (HYPERTENSION), BENIGN: Primary | ICD-10-CM

## 2017-07-07 DIAGNOSIS — E78.5 DM TYPE 2 WITH DIABETIC DYSLIPIDEMIA (HCC): ICD-10-CM

## 2017-07-07 DIAGNOSIS — G20 PARKINSON DISEASE (HCC): ICD-10-CM

## 2017-07-07 DIAGNOSIS — E11.69 DM TYPE 2 WITH DIABETIC DYSLIPIDEMIA (HCC): ICD-10-CM

## 2017-07-07 DIAGNOSIS — R19.7 DIARRHEA DUE TO MALABSORPTION: ICD-10-CM

## 2017-07-07 PROCEDURE — 1036F TOBACCO NON-USER: CPT | Performed by: FAMILY MEDICINE

## 2017-07-07 PROCEDURE — 3017F COLORECTAL CA SCREEN DOC REV: CPT | Performed by: FAMILY MEDICINE

## 2017-07-07 PROCEDURE — 3014F SCREEN MAMMO DOC REV: CPT | Performed by: FAMILY MEDICINE

## 2017-07-07 PROCEDURE — G8427 DOCREV CUR MEDS BY ELIG CLIN: HCPCS | Performed by: FAMILY MEDICINE

## 2017-07-07 PROCEDURE — 3046F HEMOGLOBIN A1C LEVEL >9.0%: CPT | Performed by: FAMILY MEDICINE

## 2017-07-07 PROCEDURE — G8399 PT W/DXA RESULTS DOCUMENT: HCPCS | Performed by: FAMILY MEDICINE

## 2017-07-07 PROCEDURE — G8420 CALC BMI NORM PARAMETERS: HCPCS | Performed by: FAMILY MEDICINE

## 2017-07-07 PROCEDURE — 1123F ACP DISCUSS/DSCN MKR DOCD: CPT | Performed by: FAMILY MEDICINE

## 2017-07-07 PROCEDURE — 4040F PNEUMOC VAC/ADMIN/RCVD: CPT | Performed by: FAMILY MEDICINE

## 2017-07-07 PROCEDURE — 1090F PRES/ABSN URINE INCON ASSESS: CPT | Performed by: FAMILY MEDICINE

## 2017-07-07 PROCEDURE — G8599 NO ASA/ANTIPLAT THER USE RNG: HCPCS | Performed by: FAMILY MEDICINE

## 2017-07-07 PROCEDURE — 99214 OFFICE O/P EST MOD 30 MIN: CPT | Performed by: FAMILY MEDICINE

## 2017-07-07 RX ORDER — LISINOPRIL 10 MG/1
TABLET ORAL
Qty: 30 TABLET | Refills: 3
Start: 2017-07-07 | End: 2017-07-28

## 2017-07-07 ASSESSMENT — ENCOUNTER SYMPTOMS
BLOATING: 0
BACK PAIN: 0
VOMITING: 0
ABDOMINAL PAIN: 0
COUGH: 0
DIARRHEA: 1
SINUS PRESSURE: 0
FLATUS: 0
SORE THROAT: 0
CONSTIPATION: 0
WHEEZING: 0
SHORTNESS OF BREATH: 0
NAUSEA: 0
RESPIRATORY NEGATIVE: 1

## 2017-07-08 ASSESSMENT — ENCOUNTER SYMPTOMS
ABDOMINAL DISTENTION: 0
RECTAL PAIN: 0
BLOOD IN STOOL: 0

## 2017-07-09 DIAGNOSIS — E11.69 DM TYPE 2 WITH DIABETIC DYSLIPIDEMIA (HCC): ICD-10-CM

## 2017-07-09 DIAGNOSIS — E78.5 DM TYPE 2 WITH DIABETIC DYSLIPIDEMIA (HCC): ICD-10-CM

## 2017-07-09 RX ORDER — PIOGLITAZONEHYDROCHLORIDE 30 MG/1
TABLET ORAL
Qty: 90 TABLET | Refills: 0 | Status: SHIPPED | OUTPATIENT
Start: 2017-07-09 | End: 2017-11-02 | Stop reason: SDUPTHER

## 2017-07-11 ENCOUNTER — OFFICE VISIT (OUTPATIENT)
Dept: BEHAVIORAL/MENTAL HEALTH | Age: 67
End: 2017-07-11

## 2017-07-11 DIAGNOSIS — F34.1 DYSTHYMIA: Primary | ICD-10-CM

## 2017-07-11 PROCEDURE — 90832 PSYTX W PT 30 MINUTES: CPT | Performed by: PSYCHOLOGIST

## 2017-07-11 ASSESSMENT — PATIENT HEALTH QUESTIONNAIRE - PHQ9
5. POOR APPETITE OR OVEREATING: 2
3. TROUBLE FALLING OR STAYING ASLEEP: 1
SUM OF ALL RESPONSES TO PHQ9 QUESTIONS 1 & 2: 2
7. TROUBLE CONCENTRATING ON THINGS, SUCH AS READING THE NEWSPAPER OR WATCHING TELEVISION: 2
SUM OF ALL RESPONSES TO PHQ QUESTIONS 1-9: 8
1. LITTLE INTEREST OR PLEASURE IN DOING THINGS: 1
9. THOUGHTS THAT YOU WOULD BE BETTER OFF DEAD, OR OF HURTING YOURSELF: 0
8. MOVING OR SPEAKING SO SLOWLY THAT OTHER PEOPLE COULD HAVE NOTICED. OR THE OPPOSITE, BEING SO FIGETY OR RESTLESS THAT YOU HAVE BEEN MOVING AROUND A LOT MORE THAN USUAL: 0
6. FEELING BAD ABOUT YOURSELF - OR THAT YOU ARE A FAILURE OR HAVE LET YOURSELF OR YOUR FAMILY DOWN: 0
10. IF YOU CHECKED OFF ANY PROBLEMS, HOW DIFFICULT HAVE THESE PROBLEMS MADE IT FOR YOU TO DO YOUR WORK, TAKE CARE OF THINGS AT HOME, OR GET ALONG WITH OTHER PEOPLE: 1
4. FEELING TIRED OR HAVING LITTLE ENERGY: 1
2. FEELING DOWN, DEPRESSED OR HOPELESS: 1

## 2017-07-18 ENCOUNTER — OFFICE VISIT (OUTPATIENT)
Dept: GASTROENTEROLOGY | Age: 67
End: 2017-07-18

## 2017-07-18 VITALS
BODY MASS INDEX: 22.47 KG/M2 | DIASTOLIC BLOOD PRESSURE: 68 MMHG | HEART RATE: 90 BPM | SYSTOLIC BLOOD PRESSURE: 105 MMHG | WEIGHT: 135 LBS

## 2017-07-18 DIAGNOSIS — R19.7 DIARRHEA, UNSPECIFIED TYPE: Primary | ICD-10-CM

## 2017-07-18 PROCEDURE — 1036F TOBACCO NON-USER: CPT | Performed by: INTERNAL MEDICINE

## 2017-07-18 PROCEDURE — 3017F COLORECTAL CA SCREEN DOC REV: CPT | Performed by: INTERNAL MEDICINE

## 2017-07-18 PROCEDURE — 4040F PNEUMOC VAC/ADMIN/RCVD: CPT | Performed by: INTERNAL MEDICINE

## 2017-07-18 PROCEDURE — G8599 NO ASA/ANTIPLAT THER USE RNG: HCPCS | Performed by: INTERNAL MEDICINE

## 2017-07-18 PROCEDURE — G8420 CALC BMI NORM PARAMETERS: HCPCS | Performed by: INTERNAL MEDICINE

## 2017-07-18 PROCEDURE — 1123F ACP DISCUSS/DSCN MKR DOCD: CPT | Performed by: INTERNAL MEDICINE

## 2017-07-18 PROCEDURE — 99203 OFFICE O/P NEW LOW 30 MIN: CPT | Performed by: INTERNAL MEDICINE

## 2017-07-18 PROCEDURE — G8427 DOCREV CUR MEDS BY ELIG CLIN: HCPCS | Performed by: INTERNAL MEDICINE

## 2017-07-18 PROCEDURE — 3014F SCREEN MAMMO DOC REV: CPT | Performed by: INTERNAL MEDICINE

## 2017-07-18 PROCEDURE — 1090F PRES/ABSN URINE INCON ASSESS: CPT | Performed by: INTERNAL MEDICINE

## 2017-07-18 PROCEDURE — G8399 PT W/DXA RESULTS DOCUMENT: HCPCS | Performed by: INTERNAL MEDICINE

## 2017-07-18 RX ORDER — LISINOPRIL 20 MG/1
TABLET ORAL
COMMUNITY
Start: 2017-07-09 | End: 2017-07-28

## 2017-07-19 ENCOUNTER — CARE COORDINATION (OUTPATIENT)
Dept: CARE COORDINATION | Age: 67
End: 2017-07-19

## 2017-07-20 ENCOUNTER — TELEPHONE (OUTPATIENT)
Dept: PRIMARY CARE CLINIC | Age: 67
End: 2017-07-20

## 2017-07-21 ENCOUNTER — ANESTHESIA EVENT (OUTPATIENT)
Dept: ENDOSCOPY | Age: 67
End: 2017-07-21
Payer: MEDICARE

## 2017-07-21 ENCOUNTER — ANESTHESIA (OUTPATIENT)
Dept: ENDOSCOPY | Age: 67
End: 2017-07-21
Payer: MEDICARE

## 2017-07-21 ENCOUNTER — HOSPITAL ENCOUNTER (OUTPATIENT)
Age: 67
Setting detail: OUTPATIENT SURGERY
Discharge: HOME | End: 2017-07-21
Attending: INTERNAL MEDICINE | Admitting: INTERNAL MEDICINE
Payer: MEDICARE

## 2017-07-21 VITALS
OXYGEN SATURATION: 98 % | DIASTOLIC BLOOD PRESSURE: 70 MMHG | SYSTOLIC BLOOD PRESSURE: 150 MMHG | RESPIRATION RATE: 9 BRPM

## 2017-07-21 VITALS
DIASTOLIC BLOOD PRESSURE: 66 MMHG | RESPIRATION RATE: 18 BRPM | HEIGHT: 65 IN | SYSTOLIC BLOOD PRESSURE: 121 MMHG | BODY MASS INDEX: 21.99 KG/M2 | WEIGHT: 132 LBS | TEMPERATURE: 97.7 F | OXYGEN SATURATION: 98 % | HEART RATE: 72 BPM

## 2017-07-21 PROCEDURE — 2580000003 HC RX 258: Performed by: INTERNAL MEDICINE

## 2017-07-21 PROCEDURE — 3700000000 HC ANESTHESIA ATTENDED CARE: Performed by: INTERNAL MEDICINE

## 2017-07-21 PROCEDURE — 7100000010 HC PHASE II RECOVERY - FIRST 15 MIN: Performed by: INTERNAL MEDICINE

## 2017-07-21 PROCEDURE — 6360000002 HC RX W HCPCS: Performed by: NURSE ANESTHETIST, CERTIFIED REGISTERED

## 2017-07-21 PROCEDURE — 3609027000 HC COLONOSCOPY: Performed by: INTERNAL MEDICINE

## 2017-07-21 PROCEDURE — 2500000003 HC RX 250 WO HCPCS: Performed by: NURSE ANESTHETIST, CERTIFIED REGISTERED

## 2017-07-21 PROCEDURE — 88305 TISSUE EXAM BY PATHOLOGIST: CPT

## 2017-07-21 PROCEDURE — 3700000001 HC ADD 15 MINUTES (ANESTHESIA): Performed by: INTERNAL MEDICINE

## 2017-07-21 RX ORDER — SODIUM CHLORIDE 0.9 % (FLUSH) 0.9 %
10 SYRINGE (ML) INJECTION PRN
Status: DISCONTINUED | OUTPATIENT
Start: 2017-07-21 | End: 2017-07-21 | Stop reason: HOSPADM

## 2017-07-21 RX ORDER — SODIUM CHLORIDE 9 MG/ML
INJECTION, SOLUTION INTRAVENOUS CONTINUOUS
Status: DISCONTINUED | OUTPATIENT
Start: 2017-07-21 | End: 2017-07-21 | Stop reason: HOSPADM

## 2017-07-21 RX ORDER — LIDOCAINE HYDROCHLORIDE 10 MG/ML
1 INJECTION, SOLUTION EPIDURAL; INFILTRATION; INTRACAUDAL; PERINEURAL
Status: DISCONTINUED | OUTPATIENT
Start: 2017-07-21 | End: 2017-07-21 | Stop reason: HOSPADM

## 2017-07-21 RX ORDER — ONDANSETRON 2 MG/ML
4 INJECTION INTRAMUSCULAR; INTRAVENOUS
Status: DISCONTINUED | OUTPATIENT
Start: 2017-07-21 | End: 2017-07-21 | Stop reason: HOSPADM

## 2017-07-21 RX ORDER — LIDOCAINE HYDROCHLORIDE 20 MG/ML
INJECTION, SOLUTION EPIDURAL; INFILTRATION; INTRACAUDAL; PERINEURAL PRN
Status: DISCONTINUED | OUTPATIENT
Start: 2017-07-21 | End: 2017-07-21 | Stop reason: SDUPTHER

## 2017-07-21 RX ORDER — PROPOFOL 10 MG/ML
INJECTION, EMULSION INTRAVENOUS PRN
Status: DISCONTINUED | OUTPATIENT
Start: 2017-07-21 | End: 2017-07-21 | Stop reason: SDUPTHER

## 2017-07-21 RX ORDER — SODIUM CHLORIDE 0.9 % (FLUSH) 0.9 %
10 SYRINGE (ML) INJECTION EVERY 12 HOURS SCHEDULED
Status: DISCONTINUED | OUTPATIENT
Start: 2017-07-21 | End: 2017-07-21 | Stop reason: HOSPADM

## 2017-07-21 RX ADMIN — SODIUM CHLORIDE: 9 INJECTION, SOLUTION INTRAVENOUS at 11:31

## 2017-07-21 RX ADMIN — PROPOFOL 30 MG: 10 INJECTION, EMULSION INTRAVENOUS at 12:28

## 2017-07-21 RX ADMIN — PROPOFOL 50 MG: 10 INJECTION, EMULSION INTRAVENOUS at 12:32

## 2017-07-21 RX ADMIN — PROPOFOL 50 MG: 10 INJECTION, EMULSION INTRAVENOUS at 12:26

## 2017-07-21 RX ADMIN — PROPOFOL 20 MG: 10 INJECTION, EMULSION INTRAVENOUS at 12:38

## 2017-07-21 RX ADMIN — LIDOCAINE HYDROCHLORIDE 20 MG: 20 INJECTION, SOLUTION EPIDURAL; INFILTRATION; INTRACAUDAL; PERINEURAL at 12:26

## 2017-07-21 RX ADMIN — PROPOFOL 30 MG: 10 INJECTION, EMULSION INTRAVENOUS at 12:30

## 2017-07-21 RX ADMIN — PROPOFOL 50 MG: 10 INJECTION, EMULSION INTRAVENOUS at 12:34

## 2017-07-21 RX ADMIN — PROPOFOL 20 MG: 10 INJECTION, EMULSION INTRAVENOUS at 12:36

## 2017-07-21 ASSESSMENT — PAIN - FUNCTIONAL ASSESSMENT: PAIN_FUNCTIONAL_ASSESSMENT: 0-10

## 2017-07-25 ENCOUNTER — CARE COORDINATION (OUTPATIENT)
Dept: CARE COORDINATION | Age: 67
End: 2017-07-25

## 2017-07-25 ENCOUNTER — OFFICE VISIT (OUTPATIENT)
Dept: BEHAVIORAL/MENTAL HEALTH | Age: 67
End: 2017-07-25

## 2017-07-25 DIAGNOSIS — E11.69 DM TYPE 2 WITH DIABETIC DYSLIPIDEMIA (HCC): ICD-10-CM

## 2017-07-25 DIAGNOSIS — F41.9 ANXIETY: ICD-10-CM

## 2017-07-25 DIAGNOSIS — E78.5 DM TYPE 2 WITH DIABETIC DYSLIPIDEMIA (HCC): ICD-10-CM

## 2017-07-25 DIAGNOSIS — F02.83 DEMENTIA OF THE ALZHEIMER'S TYPE, WITH EARLY ONSET, WITH DEPRESSIVE MOOD (HCC): Primary | ICD-10-CM

## 2017-07-25 DIAGNOSIS — I10 HTN (HYPERTENSION), BENIGN: ICD-10-CM

## 2017-07-25 DIAGNOSIS — G30.0 DEMENTIA OF THE ALZHEIMER'S TYPE, WITH EARLY ONSET, WITH DEPRESSIVE MOOD (HCC): Primary | ICD-10-CM

## 2017-07-25 DIAGNOSIS — F34.1 DYSTHYMIA: Primary | ICD-10-CM

## 2017-07-25 PROCEDURE — 90832 PSYTX W PT 30 MINUTES: CPT | Performed by: PSYCHOLOGIST

## 2017-07-26 ENCOUNTER — OFFICE VISIT (OUTPATIENT)
Dept: PODIATRY | Age: 67
End: 2017-07-26

## 2017-07-26 VITALS
BODY MASS INDEX: 21.83 KG/M2 | SYSTOLIC BLOOD PRESSURE: 112 MMHG | HEIGHT: 65 IN | RESPIRATION RATE: 14 BRPM | OXYGEN SATURATION: 98 % | DIASTOLIC BLOOD PRESSURE: 60 MMHG | HEART RATE: 90 BPM | WEIGHT: 131 LBS | TEMPERATURE: 98.4 F

## 2017-07-26 DIAGNOSIS — E11.59 TYPE 2 DIABETES MELLITUS WITH OTHER CIRCULATORY COMPLICATIONS (HCC): ICD-10-CM

## 2017-07-26 DIAGNOSIS — E78.5 DM TYPE 2 WITH DIABETIC DYSLIPIDEMIA (HCC): Primary | ICD-10-CM

## 2017-07-26 DIAGNOSIS — E11.69 DM TYPE 2 WITH DIABETIC DYSLIPIDEMIA (HCC): Primary | ICD-10-CM

## 2017-07-26 PROCEDURE — 99203 OFFICE O/P NEW LOW 30 MIN: CPT | Performed by: PODIATRIST

## 2017-07-26 PROCEDURE — G8399 PT W/DXA RESULTS DOCUMENT: HCPCS | Performed by: PODIATRIST

## 2017-07-26 PROCEDURE — 1090F PRES/ABSN URINE INCON ASSESS: CPT | Performed by: PODIATRIST

## 2017-07-26 PROCEDURE — 3017F COLORECTAL CA SCREEN DOC REV: CPT | Performed by: PODIATRIST

## 2017-07-26 PROCEDURE — 3046F HEMOGLOBIN A1C LEVEL >9.0%: CPT | Performed by: PODIATRIST

## 2017-07-26 PROCEDURE — 4040F PNEUMOC VAC/ADMIN/RCVD: CPT | Performed by: PODIATRIST

## 2017-07-26 PROCEDURE — G8420 CALC BMI NORM PARAMETERS: HCPCS | Performed by: PODIATRIST

## 2017-07-26 PROCEDURE — 3014F SCREEN MAMMO DOC REV: CPT | Performed by: PODIATRIST

## 2017-07-26 PROCEDURE — 1123F ACP DISCUSS/DSCN MKR DOCD: CPT | Performed by: PODIATRIST

## 2017-07-26 PROCEDURE — 1036F TOBACCO NON-USER: CPT | Performed by: PODIATRIST

## 2017-07-26 PROCEDURE — G8599 NO ASA/ANTIPLAT THER USE RNG: HCPCS | Performed by: PODIATRIST

## 2017-07-26 PROCEDURE — G8427 DOCREV CUR MEDS BY ELIG CLIN: HCPCS | Performed by: PODIATRIST

## 2017-07-28 ENCOUNTER — OFFICE VISIT (OUTPATIENT)
Dept: PRIMARY CARE CLINIC | Age: 67
End: 2017-07-28

## 2017-07-28 VITALS
WEIGHT: 138 LBS | OXYGEN SATURATION: 98 % | TEMPERATURE: 97.6 F | BODY MASS INDEX: 22.99 KG/M2 | HEART RATE: 78 BPM | RESPIRATION RATE: 16 BRPM | SYSTOLIC BLOOD PRESSURE: 122 MMHG | HEIGHT: 65 IN | DIASTOLIC BLOOD PRESSURE: 64 MMHG

## 2017-07-28 DIAGNOSIS — E11.69 DM TYPE 2 WITH DIABETIC DYSLIPIDEMIA (HCC): ICD-10-CM

## 2017-07-28 DIAGNOSIS — Z12.31 SCREENING MAMMOGRAM, ENCOUNTER FOR: ICD-10-CM

## 2017-07-28 DIAGNOSIS — I10 HTN (HYPERTENSION), BENIGN: Primary | ICD-10-CM

## 2017-07-28 DIAGNOSIS — E78.5 DM TYPE 2 WITH DIABETIC DYSLIPIDEMIA (HCC): ICD-10-CM

## 2017-07-28 DIAGNOSIS — J30.1 ALLERGIC RHINITIS DUE TO POLLEN, UNSPECIFIED RHINITIS SEASONALITY: ICD-10-CM

## 2017-07-28 PROCEDURE — 1036F TOBACCO NON-USER: CPT | Performed by: FAMILY MEDICINE

## 2017-07-28 PROCEDURE — G8420 CALC BMI NORM PARAMETERS: HCPCS | Performed by: FAMILY MEDICINE

## 2017-07-28 PROCEDURE — 3046F HEMOGLOBIN A1C LEVEL >9.0%: CPT | Performed by: FAMILY MEDICINE

## 2017-07-28 PROCEDURE — 4040F PNEUMOC VAC/ADMIN/RCVD: CPT | Performed by: FAMILY MEDICINE

## 2017-07-28 PROCEDURE — 1123F ACP DISCUSS/DSCN MKR DOCD: CPT | Performed by: FAMILY MEDICINE

## 2017-07-28 PROCEDURE — G8427 DOCREV CUR MEDS BY ELIG CLIN: HCPCS | Performed by: FAMILY MEDICINE

## 2017-07-28 PROCEDURE — G8399 PT W/DXA RESULTS DOCUMENT: HCPCS | Performed by: FAMILY MEDICINE

## 2017-07-28 PROCEDURE — 1090F PRES/ABSN URINE INCON ASSESS: CPT | Performed by: FAMILY MEDICINE

## 2017-07-28 PROCEDURE — 3017F COLORECTAL CA SCREEN DOC REV: CPT | Performed by: FAMILY MEDICINE

## 2017-07-28 PROCEDURE — 3014F SCREEN MAMMO DOC REV: CPT | Performed by: FAMILY MEDICINE

## 2017-07-28 PROCEDURE — G8599 NO ASA/ANTIPLAT THER USE RNG: HCPCS | Performed by: FAMILY MEDICINE

## 2017-07-28 PROCEDURE — 99214 OFFICE O/P EST MOD 30 MIN: CPT | Performed by: FAMILY MEDICINE

## 2017-07-28 RX ORDER — AMOXICILLIN 500 MG/1
1 CAPSULE ORAL 3 TIMES DAILY
COMMUNITY
Start: 2017-07-27 | End: 2017-07-28

## 2017-07-28 RX ORDER — METFORMIN HYDROCHLORIDE 500 MG/1
TABLET, EXTENDED RELEASE ORAL
Qty: 60 TABLET | Refills: 1
Start: 2017-07-28 | End: 2017-08-24

## 2017-07-28 ASSESSMENT — ENCOUNTER SYMPTOMS
NAUSEA: 0
SHORTNESS OF BREATH: 0
CONSTIPATION: 0
VOMITING: 0
RESPIRATORY NEGATIVE: 1
BACK PAIN: 0
ABDOMINAL PAIN: 0
DIARRHEA: 0
VISUAL CHANGE: 0
SINUS PRESSURE: 0
CHANGE IN BOWEL HABIT: 0
SORE THROAT: 0
SWOLLEN GLANDS: 0
WHEEZING: 0
COUGH: 0

## 2017-07-31 ENCOUNTER — CARE COORDINATION (OUTPATIENT)
Dept: CARE COORDINATION | Age: 67
End: 2017-07-31

## 2017-07-31 ENCOUNTER — TELEPHONE (OUTPATIENT)
Dept: PRIMARY CARE CLINIC | Age: 67
End: 2017-07-31

## 2017-08-02 ENCOUNTER — TELEPHONE (OUTPATIENT)
Dept: PRIMARY CARE CLINIC | Age: 67
End: 2017-08-02

## 2017-08-03 ENCOUNTER — CARE COORDINATION (OUTPATIENT)
Dept: CARE COORDINATION | Age: 67
End: 2017-08-03

## 2017-08-05 DIAGNOSIS — B00.9 HSV-1 INFECTION: ICD-10-CM

## 2017-08-07 RX ORDER — ACYCLOVIR 400 MG/1
TABLET ORAL
Qty: 90 TABLET | Refills: 0 | Status: SHIPPED | OUTPATIENT
Start: 2017-08-07 | End: 2017-11-02 | Stop reason: SDUPTHER

## 2017-08-08 ENCOUNTER — OFFICE VISIT (OUTPATIENT)
Dept: BEHAVIORAL/MENTAL HEALTH | Age: 67
End: 2017-08-08

## 2017-08-08 DIAGNOSIS — F34.1 DYSTHYMIA: Primary | ICD-10-CM

## 2017-08-08 PROCEDURE — 90832 PSYTX W PT 30 MINUTES: CPT | Performed by: PSYCHOLOGIST

## 2017-08-09 ENCOUNTER — CARE COORDINATION (OUTPATIENT)
Dept: CARE COORDINATION | Age: 67
End: 2017-08-09

## 2017-08-10 ENCOUNTER — TELEPHONE (OUTPATIENT)
Dept: PRIMARY CARE CLINIC | Age: 67
End: 2017-08-10

## 2017-08-15 DIAGNOSIS — F32.9 REACTIVE DEPRESSION: ICD-10-CM

## 2017-08-15 RX ORDER — BUPROPION HYDROCHLORIDE 150 MG/1
TABLET, EXTENDED RELEASE ORAL
Qty: 180 TABLET | Refills: 1 | Status: SHIPPED | OUTPATIENT
Start: 2017-08-15 | End: 2017-12-01 | Stop reason: SDUPTHER

## 2017-08-24 ENCOUNTER — OFFICE VISIT (OUTPATIENT)
Dept: PRIMARY CARE CLINIC | Age: 67
End: 2017-08-24

## 2017-08-24 ENCOUNTER — CARE COORDINATOR VISIT (OUTPATIENT)
Dept: CARE COORDINATION | Age: 67
End: 2017-08-24

## 2017-08-24 VITALS
RESPIRATION RATE: 16 BRPM | TEMPERATURE: 97.7 F | DIASTOLIC BLOOD PRESSURE: 64 MMHG | SYSTOLIC BLOOD PRESSURE: 132 MMHG | OXYGEN SATURATION: 97 % | HEIGHT: 65 IN | HEART RATE: 88 BPM | WEIGHT: 140.2 LBS | BODY MASS INDEX: 23.36 KG/M2

## 2017-08-24 DIAGNOSIS — B35.1 ONYCHOMYCOSIS: ICD-10-CM

## 2017-08-24 DIAGNOSIS — E78.2 MIXED HYPERLIPIDEMIA: ICD-10-CM

## 2017-08-24 DIAGNOSIS — I10 HTN (HYPERTENSION), BENIGN: ICD-10-CM

## 2017-08-24 DIAGNOSIS — E78.5 DM TYPE 2 WITH DIABETIC DYSLIPIDEMIA (HCC): Primary | ICD-10-CM

## 2017-08-24 DIAGNOSIS — Z12.31 SCREENING MAMMOGRAM, ENCOUNTER FOR: ICD-10-CM

## 2017-08-24 DIAGNOSIS — E11.69 DM TYPE 2 WITH DIABETIC DYSLIPIDEMIA (HCC): Primary | ICD-10-CM

## 2017-08-24 DIAGNOSIS — G30.1 LATE ONSET ALZHEIMER'S DISEASE WITH BEHAVIORAL DISTURBANCE (HCC): ICD-10-CM

## 2017-08-24 DIAGNOSIS — F02.818 LATE ONSET ALZHEIMER'S DISEASE WITH BEHAVIORAL DISTURBANCE (HCC): ICD-10-CM

## 2017-08-24 DIAGNOSIS — M15.9 PRIMARY OSTEOARTHRITIS INVOLVING MULTIPLE JOINTS: ICD-10-CM

## 2017-08-24 LAB
GLUCOSE BLD-MCNC: 257 MG/DL
HBA1C MFR BLD: 7.2 %

## 2017-08-24 PROCEDURE — G8420 CALC BMI NORM PARAMETERS: HCPCS | Performed by: FAMILY MEDICINE

## 2017-08-24 PROCEDURE — G8599 NO ASA/ANTIPLAT THER USE RNG: HCPCS | Performed by: FAMILY MEDICINE

## 2017-08-24 PROCEDURE — 1123F ACP DISCUSS/DSCN MKR DOCD: CPT | Performed by: FAMILY MEDICINE

## 2017-08-24 PROCEDURE — 3017F COLORECTAL CA SCREEN DOC REV: CPT | Performed by: FAMILY MEDICINE

## 2017-08-24 PROCEDURE — 82962 GLUCOSE BLOOD TEST: CPT | Performed by: FAMILY MEDICINE

## 2017-08-24 PROCEDURE — 1036F TOBACCO NON-USER: CPT | Performed by: FAMILY MEDICINE

## 2017-08-24 PROCEDURE — 83036 HEMOGLOBIN GLYCOSYLATED A1C: CPT | Performed by: FAMILY MEDICINE

## 2017-08-24 PROCEDURE — 1090F PRES/ABSN URINE INCON ASSESS: CPT | Performed by: FAMILY MEDICINE

## 2017-08-24 PROCEDURE — 3046F HEMOGLOBIN A1C LEVEL >9.0%: CPT | Performed by: FAMILY MEDICINE

## 2017-08-24 PROCEDURE — G8427 DOCREV CUR MEDS BY ELIG CLIN: HCPCS | Performed by: FAMILY MEDICINE

## 2017-08-24 PROCEDURE — 3014F SCREEN MAMMO DOC REV: CPT | Performed by: FAMILY MEDICINE

## 2017-08-24 PROCEDURE — 99214 OFFICE O/P EST MOD 30 MIN: CPT | Performed by: FAMILY MEDICINE

## 2017-08-24 PROCEDURE — 4040F PNEUMOC VAC/ADMIN/RCVD: CPT | Performed by: FAMILY MEDICINE

## 2017-08-24 PROCEDURE — G8399 PT W/DXA RESULTS DOCUMENT: HCPCS | Performed by: FAMILY MEDICINE

## 2017-08-24 RX ORDER — DONEPEZIL HYDROCHLORIDE 10 MG/1
20 TABLET, FILM COATED ORAL NIGHTLY
Qty: 60 TABLET | Refills: 3 | Status: SHIPPED | OUTPATIENT
Start: 2017-08-24 | End: 2017-12-01 | Stop reason: SDUPTHER

## 2017-08-24 RX ORDER — FLUCONAZOLE 100 MG/1
TABLET ORAL
Qty: 90 TABLET | Refills: 1 | Status: SHIPPED | OUTPATIENT
Start: 2017-08-24 | End: 2017-11-30 | Stop reason: SDUPTHER

## 2017-08-24 RX ORDER — SIMVASTATIN 40 MG
TABLET ORAL
Qty: 90 TABLET | Refills: 1 | Status: SHIPPED | OUTPATIENT
Start: 2017-08-24 | End: 2017-12-01 | Stop reason: SDUPTHER

## 2017-08-24 ASSESSMENT — ENCOUNTER SYMPTOMS
BACK PAIN: 0
CONSTIPATION: 0
DIARRHEA: 0
NAUSEA: 0
SINUS PRESSURE: 0
COUGH: 0
SORE THROAT: 0
WHEEZING: 0
VOMITING: 0
RESPIRATORY NEGATIVE: 1
ABDOMINAL PAIN: 0
SHORTNESS OF BREATH: 0

## 2017-08-29 ENCOUNTER — OFFICE VISIT (OUTPATIENT)
Dept: GASTROENTEROLOGY | Age: 67
End: 2017-08-29

## 2017-08-29 VITALS
SYSTOLIC BLOOD PRESSURE: 135 MMHG | BODY MASS INDEX: 23.21 KG/M2 | WEIGHT: 139.5 LBS | HEART RATE: 87 BPM | DIASTOLIC BLOOD PRESSURE: 71 MMHG

## 2017-08-29 DIAGNOSIS — R19.7 DIARRHEA, UNSPECIFIED TYPE: Primary | ICD-10-CM

## 2017-08-29 DIAGNOSIS — Z86.010 PERSONAL HISTORY OF COLONIC POLYPS: ICD-10-CM

## 2017-08-29 PROCEDURE — 3014F SCREEN MAMMO DOC REV: CPT | Performed by: INTERNAL MEDICINE

## 2017-08-29 PROCEDURE — G8399 PT W/DXA RESULTS DOCUMENT: HCPCS | Performed by: INTERNAL MEDICINE

## 2017-08-29 PROCEDURE — 1036F TOBACCO NON-USER: CPT | Performed by: INTERNAL MEDICINE

## 2017-08-29 PROCEDURE — 3017F COLORECTAL CA SCREEN DOC REV: CPT | Performed by: INTERNAL MEDICINE

## 2017-08-29 PROCEDURE — 1123F ACP DISCUSS/DSCN MKR DOCD: CPT | Performed by: INTERNAL MEDICINE

## 2017-08-29 PROCEDURE — 1090F PRES/ABSN URINE INCON ASSESS: CPT | Performed by: INTERNAL MEDICINE

## 2017-08-29 PROCEDURE — G8599 NO ASA/ANTIPLAT THER USE RNG: HCPCS | Performed by: INTERNAL MEDICINE

## 2017-08-29 PROCEDURE — G8420 CALC BMI NORM PARAMETERS: HCPCS | Performed by: INTERNAL MEDICINE

## 2017-08-29 PROCEDURE — 99212 OFFICE O/P EST SF 10 MIN: CPT | Performed by: INTERNAL MEDICINE

## 2017-08-29 PROCEDURE — 4040F PNEUMOC VAC/ADMIN/RCVD: CPT | Performed by: INTERNAL MEDICINE

## 2017-08-29 PROCEDURE — G8427 DOCREV CUR MEDS BY ELIG CLIN: HCPCS | Performed by: INTERNAL MEDICINE

## 2017-09-12 ENCOUNTER — HOSPITAL ENCOUNTER (OUTPATIENT)
Dept: WOMENS IMAGING | Age: 67
Discharge: HOME | End: 2017-09-12
Payer: MEDICARE

## 2017-09-12 DIAGNOSIS — Z12.31 SCREENING MAMMOGRAM, ENCOUNTER FOR: ICD-10-CM

## 2017-09-12 PROCEDURE — G0202 SCR MAMMO BI INCL CAD: HCPCS

## 2017-09-18 DIAGNOSIS — E78.2 MIXED HYPERLIPIDEMIA: ICD-10-CM

## 2017-09-18 LAB
ALBUMIN SERPL-MCNC: 4.6 G/DL (ref 3.9–4.9)
ALP BLD-CCNC: 99 U/L (ref 40–130)
ALT SERPL-CCNC: 36 U/L (ref 0–33)
ANION GAP SERPL CALCULATED.3IONS-SCNC: 18 MEQ/L (ref 7–13)
AST SERPL-CCNC: 40 U/L (ref 0–35)
BILIRUB SERPL-MCNC: 0.5 MG/DL (ref 0–1.2)
BUN BLDV-MCNC: 13 MG/DL (ref 8–23)
CALCIUM SERPL-MCNC: 9.8 MG/DL (ref 8.6–10.2)
CHLORIDE BLD-SCNC: 100 MEQ/L (ref 98–107)
CHOLESTEROL, TOTAL: 161 MG/DL (ref 0–199)
CO2: 26 MEQ/L (ref 22–29)
CREAT SERPL-MCNC: 0.38 MG/DL (ref 0.5–0.9)
GFR AFRICAN AMERICAN: >60
GFR NON-AFRICAN AMERICAN: >60
GLOBULIN: 2.4 G/DL (ref 2.3–3.5)
GLUCOSE BLD-MCNC: 118 MG/DL (ref 74–109)
HDLC SERPL-MCNC: 77 MG/DL (ref 40–59)
LDL CHOLESTEROL CALCULATED: 46 MG/DL (ref 0–129)
POTASSIUM SERPL-SCNC: 4 MEQ/L (ref 3.5–5.1)
SODIUM BLD-SCNC: 144 MEQ/L (ref 132–144)
TOTAL PROTEIN: 7 G/DL (ref 6.4–8.1)
TRIGL SERPL-MCNC: 191 MG/DL (ref 0–200)

## 2017-09-21 ENCOUNTER — OFFICE VISIT (OUTPATIENT)
Dept: BEHAVIORAL/MENTAL HEALTH | Age: 67
End: 2017-09-21

## 2017-09-21 DIAGNOSIS — F34.1 DYSTHYMIA: Primary | ICD-10-CM

## 2017-09-21 PROCEDURE — 90832 PSYTX W PT 30 MINUTES: CPT | Performed by: PSYCHOLOGIST

## 2017-09-21 ASSESSMENT — PATIENT HEALTH QUESTIONNAIRE - PHQ9
2. FEELING DOWN, DEPRESSED OR HOPELESS: 0
9. THOUGHTS THAT YOU WOULD BE BETTER OFF DEAD, OR OF HURTING YOURSELF: 0
8. MOVING OR SPEAKING SO SLOWLY THAT OTHER PEOPLE COULD HAVE NOTICED. OR THE OPPOSITE, BEING SO FIGETY OR RESTLESS THAT YOU HAVE BEEN MOVING AROUND A LOT MORE THAN USUAL: 0
1. LITTLE INTEREST OR PLEASURE IN DOING THINGS: 1
6. FEELING BAD ABOUT YOURSELF - OR THAT YOU ARE A FAILURE OR HAVE LET YOURSELF OR YOUR FAMILY DOWN: 0
SUM OF ALL RESPONSES TO PHQ QUESTIONS 1-9: 2
SUM OF ALL RESPONSES TO PHQ9 QUESTIONS 1 & 2: 1
3. TROUBLE FALLING OR STAYING ASLEEP: 0
7. TROUBLE CONCENTRATING ON THINGS, SUCH AS READING THE NEWSPAPER OR WATCHING TELEVISION: 0
10. IF YOU CHECKED OFF ANY PROBLEMS, HOW DIFFICULT HAVE THESE PROBLEMS MADE IT FOR YOU TO DO YOUR WORK, TAKE CARE OF THINGS AT HOME, OR GET ALONG WITH OTHER PEOPLE: 0
5. POOR APPETITE OR OVEREATING: 0
4. FEELING TIRED OR HAVING LITTLE ENERGY: 1

## 2017-09-28 ENCOUNTER — OFFICE VISIT (OUTPATIENT)
Dept: PRIMARY CARE CLINIC | Age: 67
End: 2017-09-28

## 2017-09-28 ENCOUNTER — CARE COORDINATOR VISIT (OUTPATIENT)
Dept: CARE COORDINATION | Age: 67
End: 2017-09-28

## 2017-09-28 VITALS
HEIGHT: 65 IN | TEMPERATURE: 98.1 F | BODY MASS INDEX: 24.16 KG/M2 | DIASTOLIC BLOOD PRESSURE: 72 MMHG | SYSTOLIC BLOOD PRESSURE: 134 MMHG | HEART RATE: 96 BPM | RESPIRATION RATE: 16 BRPM | OXYGEN SATURATION: 99 % | WEIGHT: 145 LBS

## 2017-09-28 DIAGNOSIS — G30.0 DEMENTIA OF THE ALZHEIMER'S TYPE, WITH EARLY ONSET, WITH DEPRESSIVE MOOD (HCC): ICD-10-CM

## 2017-09-28 DIAGNOSIS — F02.83 DEMENTIA OF THE ALZHEIMER'S TYPE, WITH EARLY ONSET, WITH DEPRESSIVE MOOD (HCC): ICD-10-CM

## 2017-09-28 DIAGNOSIS — E11.69 DM TYPE 2 WITH DIABETIC DYSLIPIDEMIA (HCC): ICD-10-CM

## 2017-09-28 DIAGNOSIS — Z23 NEED FOR INFLUENZA VACCINATION: ICD-10-CM

## 2017-09-28 DIAGNOSIS — J30.1 ALLERGIC RHINITIS DUE TO POLLEN, UNSPECIFIED RHINITIS SEASONALITY: ICD-10-CM

## 2017-09-28 DIAGNOSIS — I10 HTN (HYPERTENSION), BENIGN: Primary | ICD-10-CM

## 2017-09-28 DIAGNOSIS — E78.5 DM TYPE 2 WITH DIABETIC DYSLIPIDEMIA (HCC): ICD-10-CM

## 2017-09-28 PROCEDURE — 1036F TOBACCO NON-USER: CPT | Performed by: FAMILY MEDICINE

## 2017-09-28 PROCEDURE — 3017F COLORECTAL CA SCREEN DOC REV: CPT | Performed by: FAMILY MEDICINE

## 2017-09-28 PROCEDURE — 90662 IIV NO PRSV INCREASED AG IM: CPT | Performed by: FAMILY MEDICINE

## 2017-09-28 PROCEDURE — 99214 OFFICE O/P EST MOD 30 MIN: CPT | Performed by: FAMILY MEDICINE

## 2017-09-28 PROCEDURE — 4040F PNEUMOC VAC/ADMIN/RCVD: CPT | Performed by: FAMILY MEDICINE

## 2017-09-28 PROCEDURE — 3046F HEMOGLOBIN A1C LEVEL >9.0%: CPT | Performed by: FAMILY MEDICINE

## 2017-09-28 PROCEDURE — G0008 ADMIN INFLUENZA VIRUS VAC: HCPCS | Performed by: FAMILY MEDICINE

## 2017-09-28 PROCEDURE — G8599 NO ASA/ANTIPLAT THER USE RNG: HCPCS | Performed by: FAMILY MEDICINE

## 2017-09-28 PROCEDURE — 1090F PRES/ABSN URINE INCON ASSESS: CPT | Performed by: FAMILY MEDICINE

## 2017-09-28 PROCEDURE — G8427 DOCREV CUR MEDS BY ELIG CLIN: HCPCS | Performed by: FAMILY MEDICINE

## 2017-09-28 PROCEDURE — G8399 PT W/DXA RESULTS DOCUMENT: HCPCS | Performed by: FAMILY MEDICINE

## 2017-09-28 PROCEDURE — G8420 CALC BMI NORM PARAMETERS: HCPCS | Performed by: FAMILY MEDICINE

## 2017-09-28 PROCEDURE — 1123F ACP DISCUSS/DSCN MKR DOCD: CPT | Performed by: FAMILY MEDICINE

## 2017-09-28 PROCEDURE — 3014F SCREEN MAMMO DOC REV: CPT | Performed by: FAMILY MEDICINE

## 2017-09-28 RX ORDER — GLIPIZIDE 5 MG/1
5 TABLET, FILM COATED, EXTENDED RELEASE ORAL DAILY
Qty: 30 TABLET | Refills: 2 | Status: SHIPPED | OUTPATIENT
Start: 2017-09-28 | End: 2017-11-30 | Stop reason: SDUPTHER

## 2017-09-28 RX ORDER — MEMANTINE HYDROCHLORIDE 5 MG/1
1 TABLET ORAL 2 TIMES DAILY
Refills: 1 | COMMUNITY
Start: 2017-09-15 | End: 2017-11-02

## 2017-09-28 RX ORDER — HYDROXYZINE HYDROCHLORIDE 10 MG/1
1 TABLET, FILM COATED ORAL DAILY PRN
Refills: 0 | COMMUNITY
Start: 2017-09-08 | End: 2017-11-02

## 2017-09-28 ASSESSMENT — ENCOUNTER SYMPTOMS
WHEEZING: 0
SORE THROAT: 0
SHORTNESS OF BREATH: 0
VOMITING: 0
BACK PAIN: 0
RESPIRATORY NEGATIVE: 1
NAUSEA: 0
CONSTIPATION: 0
SINUS PRESSURE: 0
COUGH: 0
BLURRED VISION: 0
ABDOMINAL PAIN: 0
DIARRHEA: 0

## 2017-10-05 ENCOUNTER — CARE COORDINATOR VISIT (OUTPATIENT)
Dept: CARE COORDINATION | Age: 67
End: 2017-10-05

## 2017-10-05 ENCOUNTER — OFFICE VISIT (OUTPATIENT)
Dept: BEHAVIORAL/MENTAL HEALTH | Age: 67
End: 2017-10-05

## 2017-10-05 DIAGNOSIS — F34.1 DYSTHYMIA: Primary | ICD-10-CM

## 2017-10-05 PROCEDURE — 90832 PSYTX W PT 30 MINUTES: CPT | Performed by: PSYCHOLOGIST

## 2017-10-05 ASSESSMENT — PATIENT HEALTH QUESTIONNAIRE - PHQ9
10. IF YOU CHECKED OFF ANY PROBLEMS, HOW DIFFICULT HAVE THESE PROBLEMS MADE IT FOR YOU TO DO YOUR WORK, TAKE CARE OF THINGS AT HOME, OR GET ALONG WITH OTHER PEOPLE: 1
3. TROUBLE FALLING OR STAYING ASLEEP: 1
2. FEELING DOWN, DEPRESSED OR HOPELESS: 0
SUM OF ALL RESPONSES TO PHQ QUESTIONS 1-9: 3
4. FEELING TIRED OR HAVING LITTLE ENERGY: 1
9. THOUGHTS THAT YOU WOULD BE BETTER OFF DEAD, OR OF HURTING YOURSELF: 0
6. FEELING BAD ABOUT YOURSELF - OR THAT YOU ARE A FAILURE OR HAVE LET YOURSELF OR YOUR FAMILY DOWN: 0
1. LITTLE INTEREST OR PLEASURE IN DOING THINGS: 0
7. TROUBLE CONCENTRATING ON THINGS, SUCH AS READING THE NEWSPAPER OR WATCHING TELEVISION: 1
5. POOR APPETITE OR OVEREATING: 0
SUM OF ALL RESPONSES TO PHQ9 QUESTIONS 1 & 2: 0
8. MOVING OR SPEAKING SO SLOWLY THAT OTHER PEOPLE COULD HAVE NOTICED. OR THE OPPOSITE, BEING SO FIGETY OR RESTLESS THAT YOU HAVE BEEN MOVING AROUND A LOT MORE THAN USUAL: 0

## 2017-10-05 NOTE — PROGRESS NOTES
of education: N/A     Occupational History    Not on file. Social History Main Topics    Smoking status: Former Smoker     Packs/day: 1.50     Years: 20.00     Quit date: 1/1/1996    Smokeless tobacco: Never Used    Alcohol use 1.0 oz/week     2 Standard drinks or equivalent per week    Drug use: No    Sexual activity: Not on file     Other Topics Concern    Not on file     Social History Narrative         Family History:   Family History   Problem Relation Age of Onset    Diabetes Mother     High Blood Pressure Sister        TOBACCO:   reports that she quit smoking about 21 years ago. She has a 30.00 pack-year smoking history. She has never used smokeless tobacco.  ETOH:   reports that she drinks about 1.0 oz of alcohol per week      A:  Administered the PHQ-9, scores indicate a slight increase in symptoms (1 point), symptoms remain in the minimal depression range. Pt would likely benefit from continued [facility] services to increase behavioral management strategies and coping skills to provide symptom control and relief. PHQ Scores 10/5/2017 9/21/2017 7/11/2017 6/8/2017 3/10/2017 1/15/2016 2/5/2015   PHQ2 Score 0 1 2 3 2 0 0   PHQ9 Score 3 2 8 7 8 0 0     Interpretation of Total Score Depression Severity: 1-4 = Minimal depression, 5-9 = Mild depression, 10-14 = Moderate depression, 15-19 = Moderately severe depression, 20-27 = Severe depression      Diagnosis:    Dysthymia with anxious distress           Plan:  Pt interventions:  [place]-setting to identify pt's primary goals for [facility] visit / overall health, Supportive techniques, Emphasized self-care as important for managing overall health and Identified relevant behavioral strategies for targeting medication adherence including recommending auto renew and syncing her medications at the pharmacy.       Pt Behavioral Change Plan:  Return in 2 weeks

## 2017-10-05 NOTE — MR AVS SNAPSHOT
After Visit Summary             Gerber Grey   10/5/2017 1:00 PM   Office Visit    Description:  Female : 1950   Provider:  NICOLASA Dial   Department:  [address] [place] [address] Drive and Future Appointments         Below is a list of your follow-up and future appointments. This may not be a complete list as you may have made appointments directly with providers that we are not aware of or your providers may have made some for you. Please call your providers to confirm appointments. It is important to keep your appointments. Please bring your current insurance card, photo ID, co-pay, and all medication bottles to your appointment. If self-pay, payment is expected at the time of service. Your To-Do List     Future Appointments Provider Department Dept Phone    10/19/2017 3:00 PM AURELIA Dial. [facility] [phone]    Please arrive 15 minutes prior to appointment, bring photo ID and insurance card. 2017 1:00 PM Felicia Galan, [address] [place] [address],[address] [phone]    Please arrive 15 minutes prior to appointment, bring photo ID and insurance card. Follow-Up    Return in about 2 weeks (around 10/19/2017).          Information from Your Visit        Department     Name Address Phone Fax    [facility] [address] [employer] [address] [place] [address]       Vital Signs     Smoking Status                   Former Smoker              Medications and Orders      Your Current Medications Are              hydrOXYzine (ATARAX) 10 MG tablet Take 1 tablet by mouth daily as needed for Itching    memantine (NAMENDA) 5 MG tablet Take 1 tablet by mouth 2 times daily    glipiZIDE (GLIPIZIDE XL) 5 MG extended release tablet Take 1 tablet by mouth daily    donepezil (ARICEPT) 10 MG tablet Take 2 tablets by mouth nightly    simvastatin (ZOCOR) 40 MG tablet Take 1 tablet by mouth  nightly Neoplasm of uncertain behavior of skin    Varicose veins of both lower extremities      Your Goals as of 10/5/2017 at 1:33 PM              9/28/17 8/29/17 8/24/17       Blood Pressure    Blood Pressure < 130/80   134/72  135/71  132/64       Care Coordination    Medication Management           Notes    I will take my medication as directed. I will notify my provider of any problems with medications, like adverse effects or side effects. Barriers: impairment:  cognitive  Plan for overcoming my barriers:  will monitor meds  Confidence: 6/10  Anticipated Goal Completion Date: 10/30/2017         Result Component    HEMOGLOBIN A1C < 7.0       7.2    Related Problems    DM w/o complication type II      Immunizations as of 10/5/2017     Name Date    Influenza Vaccine, unspecified formulation 9/21/2015    Influenza Virus Vaccine 9/21/2015, 10/6/2014, 9/24/2013    Influenza, High Dose 9/28/2017, 11/17/2016    Pneumococcal 13-valent Conjugate ([id number]) 7/15/2015    Pneumococcal Polysaccharide ([id number]) 12/1/2016    Tdap (Boostrix, Adacel) 9/24/2013      Preventive Care        Date Due    Eye Exam By An Eye Doctor 3/2/2017    Urine Check For Kidney Problems 5/12/2018    Diabetic Foot Exam 7/28/2018    Hemoglobin A1C (Test For Long-Term Glucose Control) 8/24/2018    Cholesterol Screening 9/18/2018    Mammograms are recommended every 2 years for low/average risk patients aged 48 - 69, and every year for high risk patients per updated national guidelines. However these guidelines can be individualized by your provider. 9/12/2019    Colonoscopy 7/21/2020    Tetanus Combination Vaccine (2 - Td) 9/24/2023            [employer] Signup           [employer] allows you to send messages to your doctor, view your test results, renew your prescriptions, schedule appointments, view visit notes, and more. How Do I Sign Up? 1. In your Internet browser, go to [email or web address]

## 2017-10-12 DIAGNOSIS — R39.9 UTI SYMPTOMS: ICD-10-CM

## 2017-10-13 ENCOUNTER — TELEPHONE (OUTPATIENT)
Dept: PRIMARY CARE CLINIC | Age: 67
End: 2017-10-13

## 2017-10-13 DIAGNOSIS — N30.00 ACUTE CYSTITIS WITHOUT HEMATURIA: Primary | ICD-10-CM

## 2017-10-13 RX ORDER — SULFAMETHOXAZOLE AND TRIMETHOPRIM 800; 160 MG/1; MG/1
1 TABLET ORAL 2 TIMES DAILY
Qty: 14 TABLET | Refills: 0 | Status: SHIPPED | OUTPATIENT
Start: 2017-10-13 | End: 2017-10-20

## 2017-10-14 LAB — URINE CULTURE, ROUTINE: NORMAL

## 2017-10-19 ENCOUNTER — OFFICE VISIT (OUTPATIENT)
Dept: BEHAVIORAL/MENTAL HEALTH | Age: 67
End: 2017-10-19

## 2017-10-19 DIAGNOSIS — F34.1 DYSTHYMIA: Primary | ICD-10-CM

## 2017-10-19 PROCEDURE — 90832 PSYTX W PT 30 MINUTES: CPT | Performed by: PSYCHOLOGIST

## 2017-10-19 ASSESSMENT — PATIENT HEALTH QUESTIONNAIRE - PHQ9
9. THOUGHTS THAT YOU WOULD BE BETTER OFF DEAD, OR OF HURTING YOURSELF: 0
6. FEELING BAD ABOUT YOURSELF - OR THAT YOU ARE A FAILURE OR HAVE LET YOURSELF OR YOUR FAMILY DOWN: 0
4. FEELING TIRED OR HAVING LITTLE ENERGY: 1
SUM OF ALL RESPONSES TO PHQ9 QUESTIONS 1 & 2: 1
1. LITTLE INTEREST OR PLEASURE IN DOING THINGS: 0
SUM OF ALL RESPONSES TO PHQ QUESTIONS 1-9: 4
5. POOR APPETITE OR OVEREATING: 0
3. TROUBLE FALLING OR STAYING ASLEEP: 1
10. IF YOU CHECKED OFF ANY PROBLEMS, HOW DIFFICULT HAVE THESE PROBLEMS MADE IT FOR YOU TO DO YOUR WORK, TAKE CARE OF THINGS AT HOME, OR GET ALONG WITH OTHER PEOPLE: 1
8. MOVING OR SPEAKING SO SLOWLY THAT OTHER PEOPLE COULD HAVE NOTICED. OR THE OPPOSITE, BEING SO FIGETY OR RESTLESS THAT YOU HAVE BEEN MOVING AROUND A LOT MORE THAN USUAL: 1
2. FEELING DOWN, DEPRESSED OR HOPELESS: 1

## 2017-10-19 NOTE — PROGRESS NOTES
Behavioral Health Consultation  Fabiano Maki PsyD. Psychologist  10/19/17  3:07 PM      Time spent with Patient: 30 minutes  This is patient's ninth  [facility] appointment. Reason for Consult:  depression, anxiety and memory loss  Referring Provider: Brunilda Mortimer, DO  [address] [facility], [address] [facility] [place]    Feedback given to PCP. S:  Pt reports that she's been tired and her mood has been okay. She reports that she has been trying to stay positive. She reports some discord with her  stating that he woke her up early from her nap today and she felt disoriented from this. She states that she talked to the pharmacist at [facility] about options for her medications but she felt that it would take a while to sort out getting her medications at the same time. She reports difficulty related to filling her prescriptions on time.           Diagnosis Date    Allergic rhinitis     Chronic sinusitis     Depression     Hx of blood clots     leg    Hyperlipidemia     Hypertension     Menopause     Thyroid disease     Tobacco abuse     Type II or unspecified type diabetes mellitus without mention of complication, not stated as uncontrolled        O:  MSE:    Appearance    alert, cooperative  Appetite normal  Sleep disturbance Yes  Fatigue Yes  Loss of pleasure No  Impulsive behavior No  Speech    spontaneous, normal rate and normal volume  Mood    Neutral  Affect    Neutral  Thought Content    intact  Thought Process    goal directed, coherent and tangential  Associations    tangential connections  Insight    Good  Judgment    Intact  Orientation    oriented to person, place, time, and general circumstances  Memory    remote memory intact, recent memory impaired  Attention/Concentration    impaired  Morbid ideation No  Suicide Assessment    no suicidal ideation    History:    Medications:   Current Outpatient Prescriptions   Medication Sig Dispense Refill    sulfamethoxazole-trimethoprim (BACTRIM DS)

## 2017-11-02 ENCOUNTER — OFFICE VISIT (OUTPATIENT)
Dept: PRIMARY CARE CLINIC | Age: 67
End: 2017-11-02

## 2017-11-02 VITALS
OXYGEN SATURATION: 95 % | HEART RATE: 88 BPM | TEMPERATURE: 97.8 F | SYSTOLIC BLOOD PRESSURE: 130 MMHG | RESPIRATION RATE: 16 BRPM | HEIGHT: 66 IN | DIASTOLIC BLOOD PRESSURE: 68 MMHG | BODY MASS INDEX: 24.11 KG/M2 | WEIGHT: 150 LBS

## 2017-11-02 DIAGNOSIS — J01.01 ACUTE RECURRENT MAXILLARY SINUSITIS: ICD-10-CM

## 2017-11-02 DIAGNOSIS — E78.5 DM TYPE 2 WITH DIABETIC DYSLIPIDEMIA (HCC): ICD-10-CM

## 2017-11-02 DIAGNOSIS — G44.031 INTRACTABLE EPISODIC PAROXYSMAL HEMICRANIA: ICD-10-CM

## 2017-11-02 DIAGNOSIS — M15.9 PRIMARY OSTEOARTHRITIS INVOLVING MULTIPLE JOINTS: ICD-10-CM

## 2017-11-02 DIAGNOSIS — R45.86 EMOTIONAL LABILITY: ICD-10-CM

## 2017-11-02 DIAGNOSIS — F02.83 DEMENTIA OF THE ALZHEIMER'S TYPE, WITH EARLY ONSET, WITH DEPRESSIVE MOOD (HCC): Primary | ICD-10-CM

## 2017-11-02 DIAGNOSIS — E11.69 DM TYPE 2 WITH DIABETIC DYSLIPIDEMIA (HCC): ICD-10-CM

## 2017-11-02 DIAGNOSIS — B00.9 HSV-1 INFECTION: ICD-10-CM

## 2017-11-02 DIAGNOSIS — G30.0 DEMENTIA OF THE ALZHEIMER'S TYPE, WITH EARLY ONSET, WITH DEPRESSIVE MOOD (HCC): Primary | ICD-10-CM

## 2017-11-02 DIAGNOSIS — F32.0 MILD SINGLE CURRENT EPISODE OF MAJOR DEPRESSIVE DISORDER (HCC): ICD-10-CM

## 2017-11-02 LAB
GLUCOSE BLD-MCNC: 180 MG/DL
HBA1C MFR BLD: 6.1 %

## 2017-11-02 PROCEDURE — 3044F HG A1C LEVEL LT 7.0%: CPT | Performed by: FAMILY MEDICINE

## 2017-11-02 PROCEDURE — 4040F PNEUMOC VAC/ADMIN/RCVD: CPT | Performed by: FAMILY MEDICINE

## 2017-11-02 PROCEDURE — 1090F PRES/ABSN URINE INCON ASSESS: CPT | Performed by: FAMILY MEDICINE

## 2017-11-02 PROCEDURE — 82962 GLUCOSE BLOOD TEST: CPT | Performed by: FAMILY MEDICINE

## 2017-11-02 PROCEDURE — G8399 PT W/DXA RESULTS DOCUMENT: HCPCS | Performed by: FAMILY MEDICINE

## 2017-11-02 PROCEDURE — 83036 HEMOGLOBIN GLYCOSYLATED A1C: CPT | Performed by: FAMILY MEDICINE

## 2017-11-02 PROCEDURE — 99214 OFFICE O/P EST MOD 30 MIN: CPT | Performed by: FAMILY MEDICINE

## 2017-11-02 PROCEDURE — G8420 CALC BMI NORM PARAMETERS: HCPCS | Performed by: FAMILY MEDICINE

## 2017-11-02 PROCEDURE — 3017F COLORECTAL CA SCREEN DOC REV: CPT | Performed by: FAMILY MEDICINE

## 2017-11-02 PROCEDURE — G8484 FLU IMMUNIZE NO ADMIN: HCPCS | Performed by: FAMILY MEDICINE

## 2017-11-02 PROCEDURE — 1036F TOBACCO NON-USER: CPT | Performed by: FAMILY MEDICINE

## 2017-11-02 PROCEDURE — G8427 DOCREV CUR MEDS BY ELIG CLIN: HCPCS | Performed by: FAMILY MEDICINE

## 2017-11-02 PROCEDURE — G8599 NO ASA/ANTIPLAT THER USE RNG: HCPCS | Performed by: FAMILY MEDICINE

## 2017-11-02 PROCEDURE — 3014F SCREEN MAMMO DOC REV: CPT | Performed by: FAMILY MEDICINE

## 2017-11-02 PROCEDURE — 1123F ACP DISCUSS/DSCN MKR DOCD: CPT | Performed by: FAMILY MEDICINE

## 2017-11-02 RX ORDER — MEMANTINE HYDROCHLORIDE 5 MG/1
5 TABLET ORAL 2 TIMES DAILY
Qty: 60 TABLET | Refills: 1 | Status: CANCELLED | OUTPATIENT
Start: 2017-11-02

## 2017-11-02 RX ORDER — PIOGLITAZONEHYDROCHLORIDE 30 MG/1
TABLET ORAL
Qty: 90 TABLET | Refills: 0 | Status: SHIPPED | OUTPATIENT
Start: 2017-11-02 | End: 2017-12-01 | Stop reason: SDUPTHER

## 2017-11-02 RX ORDER — FLUTICASONE PROPIONATE 50 MCG
SPRAY, SUSPENSION (ML) NASAL
Qty: 3 BOTTLE | Refills: 0 | Status: SHIPPED | OUTPATIENT
Start: 2017-11-02 | End: 2017-12-01 | Stop reason: SDUPTHER

## 2017-11-02 RX ORDER — MEMANTINE HYDROCHLORIDE 10 MG/1
10 TABLET ORAL DAILY
Qty: 30 TABLET | Refills: 2 | Status: SHIPPED | OUTPATIENT
Start: 2017-11-02 | End: 2017-12-01 | Stop reason: SDUPTHER

## 2017-11-02 RX ORDER — HYDROXYZINE HYDROCHLORIDE 10 MG/1
10 TABLET, FILM COATED ORAL DAILY PRN
Qty: 100 TABLET | Refills: 1 | Status: SHIPPED | OUTPATIENT
Start: 2017-11-02 | End: 2017-12-01 | Stop reason: SDUPTHER

## 2017-11-02 RX ORDER — DICLOFENAC SODIUM 75 MG/1
TABLET, DELAYED RELEASE ORAL
Qty: 180 TABLET | Refills: 1 | Status: SHIPPED | OUTPATIENT
Start: 2017-11-02 | End: 2017-12-01 | Stop reason: SDUPTHER

## 2017-11-02 RX ORDER — ACYCLOVIR 400 MG/1
TABLET ORAL
Qty: 90 TABLET | Refills: 0 | Status: SHIPPED | OUTPATIENT
Start: 2017-11-02 | End: 2017-12-01 | Stop reason: SDUPTHER

## 2017-11-02 ASSESSMENT — ENCOUNTER SYMPTOMS
RESPIRATORY NEGATIVE: 1
CONSTIPATION: 0
WHEEZING: 0
COUGH: 0
ABDOMINAL PAIN: 0
BACK PAIN: 0
DIARRHEA: 0
SINUS PRESSURE: 0
VOMITING: 0
SORE THROAT: 0
SHORTNESS OF BREATH: 0
NAUSEA: 0

## 2017-11-02 NOTE — PROGRESS NOTES
facility-administered medications on file prior to visit. Review of Systems   Constitutional: Negative for chills, fatigue and fever. HENT: Negative for congestion, ear pain, sinus pressure and sore throat. Respiratory: Negative. Negative for cough, shortness of breath and wheezing. Cardiovascular: Negative for chest pain and palpitations. Gastrointestinal: Negative for abdominal pain, constipation, diarrhea, nausea and vomiting. Musculoskeletal: Negative for back pain and neck pain. Neurological: Positive for headaches. Negative for dizziness. Objective    Vitals:    11/02/17 1323   BP: 130/68   Site: Left Arm   Position: Sitting   Cuff Size: Large Adult   Pulse: 88   Resp: 16   Temp: 97.8 °F (36.6 °C)   TempSrc: Tympanic   SpO2: 95%   Weight: 150 lb (68 kg)   Height: 5' 5.5\" (1.664 m)     Physical Exam   Constitutional: She is oriented to person, place, and time. She appears well-developed and well-nourished. HENT:   Head: Normocephalic and atraumatic. Eyes: Conjunctivae and EOM are normal. Pupils are equal, round, and reactive to light. Neck: Normal range of motion. Neck supple. No thyromegaly present. Cardiovascular: Normal rate, regular rhythm and normal heart sounds. No murmur heard. Pulmonary/Chest: Effort normal and breath sounds normal. She has no wheezes. She exhibits no tenderness. Abdominal: Soft. Bowel sounds are normal. There is no tenderness. Musculoskeletal: Normal range of motion. She exhibits no edema or tenderness. Lymphadenopathy:     She has no cervical adenopathy. Neurological: She is alert and oriented to person, place, and time. She has normal reflexes. Coordination normal.   Skin: Skin is warm and dry. No rash noted. Psychiatric: Thought content normal. Her mood appears not anxious. Her affect is blunt. Her speech is delayed. She is slowed. She is not agitated. Cognition and memory are impaired. She exhibits a depressed mood.  She exhibits abnormal recent memory and abnormal remote memory. Nursing note and vitals reviewed. Assessment & Plan   1. Intractable episodic paroxysmal hemicrania     2. Dementia of the Alzheimer's type, with early onset, with depressive mood     3. HSV-1 infection  acyclovir (ZOVIRAX) 400 MG tablet   4. DM type 2 with diabetic dyslipidemia (HCC)  pioglitazone (ACTOS) 30 MG tablet    Ambulatory referral to Ophthalmology    POCT Glucose    POCT glycosylated hemoglobin (Hb A1C)   5. Primary osteoarthritis involving multiple joints  diclofenac (VOLTAREN) 75 MG EC tablet   6.  Acute recurrent maxillary sinusitis  fluticasone (FLONASE) 50 MCG/ACT nasal spray     Orders Placed This Encounter   Procedures    Ambulatory referral to Ophthalmology     Referral Priority:   Routine     Referral Type:   Consult for Advice and Opinion     Referral Reason:   Specialty Services Required     Requested Specialty:   Ophthalmology     Number of Visits Requested:   1    POCT Glucose    POCT glycosylated hemoglobin (Hb A1C)     Orders Placed This Encounter   Medications    hydrOXYzine (ATARAX) 10 MG tablet     Sig: Take 1 tablet by mouth daily as needed for Itching     Dispense:  100 tablet     Refill:  1    acyclovir (ZOVIRAX) 400 MG tablet     Sig: TAKE ONE TABLET BY MOUTH DAILY     Dispense:  90 tablet     Refill:  0    pioglitazone (ACTOS) 30 MG tablet     Sig: TAKE ONE TABLET BY MOUTH EVERY DAY     Dispense:  90 tablet     Refill:  0    diclofenac (VOLTAREN) 75 MG EC tablet     Sig: Take 1 tablet by mouth  twice a day with meals     Dispense:  180 tablet     Refill:  1    fluticasone (FLONASE) 50 MCG/ACT nasal spray     Sig: Use 2 sprays nasally daily     Dispense:  3 Bottle     Refill:  0    memantine (NAMENDA) 10 MG tablet     Sig: Take 1 tablet by mouth daily     Dispense:  30 tablet     Refill:  2    dextromethorphan-quiNIDine (NUEDEXTA) 20-10 MG CAPS per capsule     Sig: Take 1 capsule by mouth daily     Dispense:  30 capsule celecoxib (CELEBREX) 100 MG capsule Take 1 capsule by mouth 2 times daily 60 capsule 3    Multiple Vitamin (MULTIVITAMIN PO) Take  by mouth daily. HEALTH MAINTENANCE LIST REVIEWED WITH PATIENT  Health Maintenance   Topic Date Due    Diabetic retinal exam  03/02/2017    Diabetic microalbuminuria test  05/12/2018    Diabetic foot exam  07/28/2018    Lipid screen  09/18/2018    Diabetic hemoglobin A1C test  11/02/2018    Breast cancer screen  09/12/2019    Colon cancer screen colonoscopy  07/21/2020    DTaP/Tdap/Td vaccine (2 - Td) 09/24/2023    Zostavax vaccine  Completed    DEXA (modify frequency per FRAX score)  Addressed    Flu vaccine  Completed    Pneumococcal low/med risk  Completed    Hepatitis C screen  [address] Maintenance Due   Topic Date Due    Diabetic retinal exam  03/02/2017         Patient was advised importance of proper diet/nutrition addition adequate hydration. Patient will follow-up with us as scheduled. Return in about 4 weeks (around 11/30/2017) for Review of Labs & Diagnostic Testing, Routine follow up.     Marilyn Crumbly, DO

## 2017-11-03 RX ORDER — CITALOPRAM 10 MG/1
TABLET ORAL
Qty: 30 TABLET | Refills: 3 | Status: SHIPPED | OUTPATIENT
Start: 2017-11-03 | End: 2017-12-11

## 2017-11-07 ENCOUNTER — CARE COORDINATION (OUTPATIENT)
Dept: CARE COORDINATION | Age: 67
End: 2017-11-07

## 2017-11-07 ENCOUNTER — OFFICE VISIT (OUTPATIENT)
Dept: BEHAVIORAL/MENTAL HEALTH | Age: 67
End: 2017-11-07

## 2017-11-07 ENCOUNTER — OFFICE VISIT (OUTPATIENT)
Dept: PRIMARY CARE CLINIC | Age: 67
End: 2017-11-07

## 2017-11-07 VITALS
HEIGHT: 65 IN | SYSTOLIC BLOOD PRESSURE: 158 MMHG | HEART RATE: 86 BPM | OXYGEN SATURATION: 94 % | WEIGHT: 153 LBS | RESPIRATION RATE: 16 BRPM | DIASTOLIC BLOOD PRESSURE: 78 MMHG | BODY MASS INDEX: 25.49 KG/M2 | TEMPERATURE: 98 F

## 2017-11-07 DIAGNOSIS — F34.1 DYSTHYMIA: Primary | ICD-10-CM

## 2017-11-07 DIAGNOSIS — M79.672 FOOT PAIN, LEFT: Primary | ICD-10-CM

## 2017-11-07 DIAGNOSIS — I10 HTN (HYPERTENSION), BENIGN: ICD-10-CM

## 2017-11-07 DIAGNOSIS — F34.1 DYSTHYMIA: ICD-10-CM

## 2017-11-07 PROCEDURE — G8484 FLU IMMUNIZE NO ADMIN: HCPCS | Performed by: FAMILY MEDICINE

## 2017-11-07 PROCEDURE — 1123F ACP DISCUSS/DSCN MKR DOCD: CPT | Performed by: FAMILY MEDICINE

## 2017-11-07 PROCEDURE — G8399 PT W/DXA RESULTS DOCUMENT: HCPCS | Performed by: FAMILY MEDICINE

## 2017-11-07 PROCEDURE — 1036F TOBACCO NON-USER: CPT | Performed by: FAMILY MEDICINE

## 2017-11-07 PROCEDURE — G8419 CALC BMI OUT NRM PARAM NOF/U: HCPCS | Performed by: FAMILY MEDICINE

## 2017-11-07 PROCEDURE — 99214 OFFICE O/P EST MOD 30 MIN: CPT | Performed by: FAMILY MEDICINE

## 2017-11-07 PROCEDURE — 1090F PRES/ABSN URINE INCON ASSESS: CPT | Performed by: FAMILY MEDICINE

## 2017-11-07 PROCEDURE — G8427 DOCREV CUR MEDS BY ELIG CLIN: HCPCS | Performed by: FAMILY MEDICINE

## 2017-11-07 PROCEDURE — 3017F COLORECTAL CA SCREEN DOC REV: CPT | Performed by: FAMILY MEDICINE

## 2017-11-07 PROCEDURE — G8599 NO ASA/ANTIPLAT THER USE RNG: HCPCS | Performed by: FAMILY MEDICINE

## 2017-11-07 PROCEDURE — 3014F SCREEN MAMMO DOC REV: CPT | Performed by: FAMILY MEDICINE

## 2017-11-07 PROCEDURE — 90832 PSYTX W PT 30 MINUTES: CPT | Performed by: PSYCHOLOGIST

## 2017-11-07 PROCEDURE — 4040F PNEUMOC VAC/ADMIN/RCVD: CPT | Performed by: FAMILY MEDICINE

## 2017-11-07 RX ORDER — LISINOPRIL 10 MG/1
TABLET ORAL
Qty: 30 TABLET | Refills: 3 | Status: SHIPPED | OUTPATIENT
Start: 2017-11-07 | End: 2017-12-01 | Stop reason: SDUPTHER

## 2017-11-07 ASSESSMENT — ENCOUNTER SYMPTOMS
CONSTIPATION: 0
SORE THROAT: 0
BACK PAIN: 0
DIARRHEA: 0
NAUSEA: 0
RESPIRATORY NEGATIVE: 1
ABDOMINAL PAIN: 0
WHEEZING: 0
VOMITING: 0
SHORTNESS OF BREATH: 0
COUGH: 0
SINUS PRESSURE: 0

## 2017-11-07 NOTE — PROGRESS NOTES
Behavioral Health Consultation  Lauro Thakur PsyD. Psychologist  11/7/17  3:05 PM      Time spent with Patient: 25 minutes  This is patient's tenth  [facility] appointment. Reason for Consult:  depression, anxiety and memory loss  Referring Provider: Jacqueline Dominguez DO  [address] [facility], [address] [facility] [place]    Feedback given to PCP. S:  Pt reports that she injured her foot last Thursday following an episode of low blood sugar and stress from driving on the highway. She reports that she banged her foot on an uneven spot at a doctor's office. Pt reports that she hadn't eaten since breakfast and this was at 4pm.  She reports that this has happened a couple other times. Pt feels that the Celexa is helping to clear her head and with her mood but discussed concern that this is giving her diarrhea. She reports that she has been wearing pads in case she has an accident and experiences some anxiety regarding this. Pt reports that she is sleeping well. No SI/HI.         Diagnosis Date    Allergic rhinitis     Chronic sinusitis     Depression     Hx of blood clots     leg    Hyperlipidemia     Hypertension     Menopause     Thyroid disease     Tobacco abuse     Type II or unspecified type diabetes mellitus without mention of complication, not stated as uncontrolled        O:  MSE:    Appearance    Ambulated with a boot on her left foot, alert, cooperative  Appetite abnormal  Sleep disturbance No  Fatigue Yes  Loss of pleasure Yes  Impulsive behavior No  Speech    spontaneous, normal rate and normal volume  Mood    Neutral  Affect    Neutral  Thought Content    intact  Thought Process    goal directed, coherent and tangential  Associations    tangential connections  Insight    Good  Judgment    Intact  Orientation    oriented to person, place, time, and general circumstances  Memory    remote memory intact, recent memory impaired  Attention/Concentration    impaired  Morbid ideation No  Suicide Assessment    no suicidal ideation      History:    Medications:   Current Outpatient Prescriptions   Medication Sig Dispense Refill    citalopram (CELEXA) 10 MG tablet TAKE ONE TABLET BY MOUTH DAILY 30 tablet 3    hydrOXYzine (ATARAX) 10 MG tablet Take 1 tablet by mouth daily as needed for Itching 100 tablet 1    acyclovir (ZOVIRAX) 400 MG tablet TAKE ONE TABLET BY MOUTH DAILY 90 tablet 0    pioglitazone (ACTOS) 30 MG tablet TAKE ONE TABLET BY MOUTH EVERY DAY 90 tablet 0    diclofenac (VOLTAREN) 75 MG EC tablet Take 1 tablet by mouth  twice a day with meals 180 tablet 1    fluticasone (FLONASE) 50 MCG/ACT nasal spray Use 2 sprays nasally daily 3 Bottle 0    memantine (NAMENDA) 10 MG tablet Take 1 tablet by mouth daily 30 tablet 2    dextromethorphan-quiNIDine (NUEDEXTA) 20-10 MG CAPS per capsule Take 1 capsule by mouth daily 30 capsule 3    glipiZIDE (GLIPIZIDE XL) 5 MG extended release tablet Take 1 tablet by mouth daily 30 tablet 2    donepezil (ARICEPT) 10 MG tablet Take 2 tablets by mouth nightly 60 tablet 3    simvastatin (ZOCOR) 40 MG tablet Take 1 tablet by mouth  nightly 90 tablet 1    fluconazole (DIFLUCAN) 100 MG tablet Take 1 tablet by mouth  daily 90 tablet 1    Tuberculin-Allergy Syringes 28G X 1/2\" 1 ML MISC 1 each by Does not apply route daily as needed (as prescribed) 100 each 2    buPROPion (WELLBUTRIN SR) 150 MG extended release tablet Take 1 tablet by mouth  twice a day 180 tablet 1    Calcium Citrate-Vitamin D (CALCIUM + D PO) Take by mouth      Fexofenadine HCl (ALLEGRA PO) Take by mouth      celecoxib (CELEBREX) 100 MG capsule Take 1 capsule by mouth 2 times daily 60 capsule 3    omeprazole (PRILOSEC) 20 MG delayed release capsule Take 1 capsule by mouth  daily (Patient taking differently: as needed Take 1 capsule by mouth  daily) 90 capsule 1    Multiple Vitamin (MULTIVITAMIN PO) Take  by mouth daily. No current facility-administered medications for this visit.         Social History:   Social History     Social History    Marital status:      Spouse name: N/A    Number of children: N/A    Years of education: N/A     Occupational History    Not on file. Social History Main Topics    Smoking status: Former Smoker     Packs/day: 1.50     Years: 20.00     Quit date: 1/1/1996    Smokeless tobacco: Never Used    Alcohol use 1.0 oz/week     2 Standard drinks or equivalent per week    Drug use: No    Sexual activity: Not on file     Other Topics Concern    Not on file     Social History Narrative    No narrative on file         Family History:   Family History   Problem Relation Age of Onset    Diabetes Mother     High Blood Pressure Sister        TOBACCO:   reports that she quit smoking about 21 years ago. She has a 30.00 pack-year smoking history. She has never used smokeless tobacco.  ETOH:   reports that she drinks about 1.0 oz of alcohol per week . A:  Pt reports some symptoms of stress and anxiety although she feels that her symptoms of depression are improved. Pt would likely benefit from continued [facility] services to increase coping skills and provide symptom control and relief. PHQ Scores 10/19/2017 10/5/2017 9/21/2017 7/11/2017 6/8/2017 3/10/2017 1/15/2016   PHQ2 Score 1 0 1 2 3 2 0   PHQ9 Score 4 3 2 8 7 8 0     Interpretation of Total Score Depression Severity: 1-4 = Minimal depression, 5-9 = Mild depression, 10-14 = Moderate depression, 15-19 = Moderately severe depression, 20-27 = Severe depression      Diagnosis:    Dysthymia with anxious distress     Plan:  Pt interventions:  [place]-setting to identify pt's primary goals for [facility] visit / overall health, Supportive techniques, Emphasized self-care as important for managing overall health and Provided Psychoeducation re: blood sugar and recommended carrying snacks with her and eating at regular times. Reviewed stress management techniques including self talk and deep breathing.       Pt Behavioral

## 2017-11-07 NOTE — PROGRESS NOTES
Subjective  Zaid Campoverde, 69 y.o. female presents 11/7/2017 with  Chief Complaint   Patient presents with    Foot Pain     patient is here for left foot pain. patient states she stubbed her foot hard on the threshhold going to a doctor's appointment las week. states her foot is swollen and painful.pain is a 4-5/10       HPI  Patient comes in complaining of left foot pain which she's now had for the past few days. She met she did go into her doctor's office/allergist which time she felt a strain after twisting her ankle and foot. She presents today in a short leg boot complaining of continued foot pain. She denies any fever, chills, nausea, emesis, dull pain, shortness of breath or chest pain. HPI    Patient Histories  Past Medical History:   Diagnosis Date    Allergic rhinitis     Chronic sinusitis     Depression     Hx of blood clots     leg    Hyperlipidemia     Hypertension     Menopause     Thyroid disease     Tobacco abuse     Type II or unspecified type diabetes mellitus without mention of complication, not stated as uncontrolled      Past Surgical History:   Procedure Laterality Date    CERVIX SURGERY      CONGATION OF CERVIX    EYE SURGERY Bilateral     lasik    FINGER TRIGGER RELEASE  10/18/2016    HYSTERECTOMY      NASAL SEPTUM SURGERY  11/1999    CA COLON CA SCRN NOT  [address]  IND N/A 7/21/2017    COLONOSCOPY performed by Mira Boss MD at [place] [address] ENDOSCOPY  08/23/2012     Allergies   Allergen Reactions    Augmentin [Amoxicillin-Pot Clavulanate] Diarrhea     Patient states she can take regular Amoxicillin, just not Augmentin    Cephalexin      Other reaction(s):  Other: See Comments  Yeast infection    Ciprofloxacin Diarrhea    Darvocet [Propoxyphene N-Acetaminophen]     Effexor [Venlafaxine] Other (See Comments)    Ibuprofen     Macrobid [Nitrofurantoin Monohyd Macro] Other (See Comments) Patient complained of severe headache and neck pain    Percocet [Oxycodone-Acetaminophen]     Sulfa Antibiotics     Zithromax [Azithromycin] Diarrhea    Vicodin [Hydrocodone-Acetaminophen] Nausea And Vomiting     Social History     Social History    Marital status:      Spouse name: N/A    Number of children: N/A    Years of education: N/A     Occupational History    Not on file.      Social History Main Topics    Smoking status: Former Smoker     Packs/day: 1.50     Years: 20.00     Quit date: 1/1/1996    Smokeless tobacco: Never Used    Alcohol use 1.0 oz/week     2 Standard drinks or equivalent per week    Drug use: No    Sexual activity: Not on file     Other Topics Concern    Not on file     Social History Narrative    No narrative on file     Family History   Problem Relation Age of Onset    Diabetes Mother     High Blood Pressure Sister      Current Outpatient Prescriptions on File Prior to Visit   Medication Sig Dispense Refill    citalopram (CELEXA) 10 MG tablet TAKE ONE TABLET BY MOUTH DAILY 30 tablet 3    hydrOXYzine (ATARAX) 10 MG tablet Take 1 tablet by mouth daily as needed for Itching 100 tablet 1    acyclovir (ZOVIRAX) 400 MG tablet TAKE ONE TABLET BY MOUTH DAILY 90 tablet 0    pioglitazone (ACTOS) 30 MG tablet TAKE ONE TABLET BY MOUTH EVERY DAY 90 tablet 0    diclofenac (VOLTAREN) 75 MG EC tablet Take 1 tablet by mouth  twice a day with meals 180 tablet 1    fluticasone (FLONASE) 50 MCG/ACT nasal spray Use 2 sprays nasally daily 3 Bottle 0    memantine (NAMENDA) 10 MG tablet Take 1 tablet by mouth daily 30 tablet 2    dextromethorphan-quiNIDine (NUEDEXTA) 20-10 MG CAPS per capsule Take 1 capsule by mouth daily 30 capsule 3    glipiZIDE (GLIPIZIDE XL) 5 MG extended release tablet Take 1 tablet by mouth daily 30 tablet 2    donepezil (ARICEPT) 10 MG tablet Take 2 tablets by mouth nightly 60 tablet 3    simvastatin (ZOCOR) 40 MG tablet Take 1 tablet by mouth  nightly 90 tablet 1    fluconazole (DIFLUCAN) 100 MG tablet Take 1 tablet by mouth  daily 90 tablet 1    Tuberculin-Allergy Syringes 28G X 1/2\" 1 ML MISC 1 each by Does not apply route daily as needed (as prescribed) 100 each 2    buPROPion (WELLBUTRIN SR) 150 MG extended release tablet Take 1 tablet by mouth  twice a day 180 tablet 1    Calcium Citrate-Vitamin D (CALCIUM + D PO) Take by mouth      Fexofenadine HCl (ALLEGRA PO) Take by mouth      celecoxib (CELEBREX) 100 MG capsule Take 1 capsule by mouth 2 times daily 60 capsule 3    omeprazole (PRILOSEC) 20 MG delayed release capsule Take 1 capsule by mouth  daily (Patient taking differently: as needed Take 1 capsule by mouth  daily) 90 capsule 1    Multiple Vitamin (MULTIVITAMIN PO) Take  by mouth daily. No current facility-administered medications on file prior to visit. Review of Systems   Constitutional: Negative for chills, fatigue and fever. HENT: Negative for congestion, ear pain, sinus pressure and sore throat. Respiratory: Negative. Negative for cough, shortness of breath and wheezing. Cardiovascular: Negative for chest pain and palpitations. Gastrointestinal: Negative for abdominal pain, constipation, diarrhea, nausea and vomiting. Musculoskeletal: Positive for arthralgias and gait problem. Negative for back pain and neck pain. Left foot pain   Neurological: Negative for dizziness and headaches. Objective    Vitals:    11/07/17 1554 11/07/17 1726   BP:  (!) 158/78   Site:  Left Arm   Position:  Sitting   Cuff Size:  Medium Adult   Pulse: 86    Resp: 16    Temp: 98 °F (36.7 °C)    TempSrc: Tympanic    SpO2: 94%    Weight: 153 lb (69.4 kg)    Height: 5' 5\" (1.651 m)      Physical Exam   Constitutional: She is oriented to person, place, and time. She appears well-developed and well-nourished. HENT:   Head: Normocephalic and atraumatic.    Eyes: Conjunctivae and EOM are normal. Pupils are equal, round, and reactive to light. Neck: Normal range of motion. Neck supple. No thyromegaly present. Cardiovascular: Normal rate, regular rhythm and normal heart sounds. No murmur heard. Pulmonary/Chest: Effort normal and breath sounds normal. She has no wheezes. She exhibits no tenderness. Abdominal: Soft. Bowel sounds are normal. There is no tenderness. Musculoskeletal: Normal range of motion. She exhibits no edema or tenderness. Lymphadenopathy:     She has no cervical adenopathy. Neurological: She is alert and oriented to person, place, and time. She has normal reflexes. She displays atrophy. She exhibits abnormal muscle tone. Coordination abnormal.   Marked tenderness without any swelling or erythema to the mid second and third metatarsals. Patient has diminished forward flexion/dorsiflexion of the forefoot. Skin: Skin is warm and dry. No rash noted. Psychiatric: She has a normal mood and affect. Thought content normal.   Nursing note and vitals reviewed. Assessment & Plan   1. Foot pain, left  XR FOOT LEFT (2 VIEWS)   2. HTN (hypertension), benign  lisinopril (PRINIVIL;ZESTRIL) 10 MG tablet   3. Dysthymia       Orders Placed This Encounter   Procedures    XR FOOT LEFT (2 VIEWS)     Standing Status:   Future     Number of Occurrences:   1     Standing Expiration Date:   11/7/2018     Orders Placed This Encounter   Medications    lisinopril (PRINIVIL;ZESTRIL) 10 MG tablet     Sig: One daily     Dispense:  30 tablet     Refill:  3     There are no discontinued medications.     PATIENT COUNSELED TO CONTINUE ALL CURRENT MEDS     Outpatient Prescriptions Marked as Taking for the 11/7/17 encounter (Office Visit) with Sheena Cabot, DO   Medication Sig Dispense Refill    lisinopril (PRINIVIL;ZESTRIL) 10 MG tablet One daily 30 tablet 3           HEALTH MAINTENANCE LIST REVIEWED WITH PATIENT  Health Maintenance   Topic Date Due    Diabetic retinal exam  03/02/2017    Diabetic microalbuminuria test  05/12/2018   

## 2017-11-08 NOTE — CARE COORDINATION
Ambulatory Care Coordination Note  11/7/2017  CM Risk Score: 2  Johny Mortality Risk Score: 9.36    ACC: Moriah Topete, RN    Summary Note: call to Tavo. She reports hypoglycemia . She was able to treat but did not recognize symptoms immediately. Discussed appropriate treatment and need to report frequency to dr office. She does not test blood sugar. She also has injured left foot with edema and pain. Scheduled for eval of foot today  Diabetes Assessment    Medic Alert ID:  No  Meal Planning:  Avoidance of concentrated sweets   How often do you test your blood sugar?:  Daily   Do you have barriers with adherence to non-pharmacologic self-management interventions? (Nutrition/Exercise/Self-Monitoring):  No   Have you ever had to go to the ED for symptoms of low blood sugar?:  No       Symptoms of Low Blood Sugar   Do you have hyperglycemia symptoms?:  No   Do you have hypoglycemia symptoms?:  Yes   Frequency of Episodes:  2 Per:  Month   Blood Sugar Monitoring Regimen:  Not Testing              Care Coordination Interventions    Program Enrollment:  Rising Risk  Referral from Primary Care Provider:  Yes  Suggested Interventions and Community Resources  [facility]  Completed  Pharmacist:  Completed  Zone Management Tools:  Completed         Goals Addressed             Most Recent     Medication Management   On track (11/7/2017)             I will take my medication as directed. I will notify my provider of any problems with medications, like adverse effects or side effects. Barriers: impairment:  cognitive  Plan for overcoming my barriers:  will monitor meds  Confidence: 6/10  Anticipated Goal Completion Date: 11/30/2017            Prior to Admission medications    Medication Sig Start Date End Date Taking?  Authorizing Provider   lisinopril (PRINIVIL;ZESTRIL) 10 MG tablet One daily 11/7/17   Shanta Gallardo DO   citalopram (CELEXA) 10 MG tablet TAKE ONE TABLET BY MOUTH DAILY 11/3/17   Jesus Lepe [facility], DO   hydrOXYzine (ATARAX) 10 MG tablet Take 1 tablet by mouth daily as needed for Itching 11/2/17   Samella Mom, DO   acyclovir (ZOVIRAX) 400 MG tablet TAKE ONE TABLET BY MOUTH DAILY 11/2/17   Samella Mom, DO   pioglitazone (ACTOS) 30 MG tablet TAKE ONE TABLET BY MOUTH EVERY DAY 11/2/17   Samella Mom, DO   diclofenac (VOLTAREN) 75 MG EC tablet Take 1 tablet by mouth  twice a day with meals 11/2/17   Samella Mom, DO   fluticasone Maxi Mary) 50 MCG/ACT nasal spray Use 2 sprays nasally daily 11/2/17   [facility], DO   memantine (NAMENDA) 10 MG tablet Take 1 tablet by mouth daily 11/2/17   Samella Mom, DO   dextromethorphan-quiNIDine (NUEDEXTA) 20-10 MG CAPS per capsule Take 1 capsule by mouth daily 11/2/17   [facility], DO   glipiZIDE (GLIPIZIDE XL) 5 MG extended release tablet Take 1 tablet by mouth daily 9/28/17   Samella Mom, DO   donepezil (ARICEPT) 10 MG tablet Take 2 tablets by mouth nightly 8/24/17   Samella Mom, DO   simvastatin (ZOCOR) 40 MG tablet Take 1 tablet by mouth  nightly 8/24/17   [facility], DO   fluconazole (DIFLUCAN) 100 MG tablet Take 1 tablet by mouth  daily 8/24/17   [facility], DO   Tuberculin-Allergy Syringes 28G X 1/2\" 1 ML MISC 1 each by Does not apply route daily as needed (as prescribed) 8/24/17   [facility], DO   buPROPion [facility]) 150 MG extended release tablet Take 1 tablet by mouth  twice a day 8/15/17   Samella Mom, DO   Calcium Citrate-Vitamin D (CALCIUM + D PO) Take by mouth    Historical Provider, MD   Fexofenadine HCl (ALLEGRA PO) Take by mouth    Historical Provider, MD   celecoxib (CELEBREX) 100 MG capsule Take 1 capsule by mouth 2 times daily 5/12/17   Samella Mom, DO   omeprazole (PRILOSEC) 20 MG delayed release capsule Take 1 capsule by mouth  daily  Patient taking differently: as needed Take 1 capsule by mouth  daily 3/10/17   Omar Sargent, DO   Multiple Vitamin (MULTIVITAMIN PO) Take  by mouth daily.      Historical Provider, MD       Future Appointments  Date Time Provider Rohan Waggoner   11/21/2017 1:00 PM Josie Woodruff PSY.D [facility] [address]   12/1/2017 2:30 PM Kylee Spaulding, [address] [place] [address]

## 2017-11-21 ENCOUNTER — TELEPHONE (OUTPATIENT)
Dept: PRIMARY CARE CLINIC | Age: 67
End: 2017-11-21

## 2017-11-21 ENCOUNTER — CARE COORDINATION (OUTPATIENT)
Dept: CARE COORDINATION | Age: 67
End: 2017-11-21

## 2017-11-21 NOTE — TELEPHONE ENCOUNTER
Patient called and spoke to [facility] 1. States she is having difficulty falling asleep and would like to have a mild medication to aid in this. 2. States she is starting to have soft stools again and is wondering if this could be caused by the hydroxyzine (atarax). Patient is taking 10 mg PRN.     Please advise

## 2017-11-22 NOTE — CARE COORDINATION
Received call from Sarita Hutson stating she did forget [facility] appt today. She will reschedule. She is requesting medication due to difficulty falling asleep. She is also concerned that hydroxyzine is causing soft stools.  Message to

## 2017-11-30 DIAGNOSIS — E78.5 DM TYPE 2 WITH DIABETIC DYSLIPIDEMIA (HCC): ICD-10-CM

## 2017-11-30 DIAGNOSIS — B35.1 ONYCHOMYCOSIS: ICD-10-CM

## 2017-11-30 DIAGNOSIS — E11.69 DM TYPE 2 WITH DIABETIC DYSLIPIDEMIA (HCC): ICD-10-CM

## 2017-11-30 RX ORDER — GLIPIZIDE 5 MG/1
TABLET, EXTENDED RELEASE ORAL
Qty: 30 TABLET | Refills: 5 | Status: SHIPPED | OUTPATIENT
Start: 2017-11-30 | End: 2017-12-01 | Stop reason: SDUPTHER

## 2017-11-30 RX ORDER — FLUCONAZOLE 100 MG/1
TABLET ORAL
Qty: 90 TABLET | Refills: 1 | Status: SHIPPED | OUTPATIENT
Start: 2017-11-30 | End: 2017-12-01 | Stop reason: SDUPTHER

## 2017-12-01 ENCOUNTER — OFFICE VISIT (OUTPATIENT)
Dept: PRIMARY CARE CLINIC | Age: 67
End: 2017-12-01

## 2017-12-01 VITALS
DIASTOLIC BLOOD PRESSURE: 66 MMHG | HEART RATE: 99 BPM | RESPIRATION RATE: 14 BRPM | BODY MASS INDEX: 25.33 KG/M2 | SYSTOLIC BLOOD PRESSURE: 130 MMHG | HEIGHT: 65 IN | OXYGEN SATURATION: 97 % | WEIGHT: 152 LBS | TEMPERATURE: 98.2 F

## 2017-12-01 DIAGNOSIS — I10 HTN (HYPERTENSION), BENIGN: ICD-10-CM

## 2017-12-01 DIAGNOSIS — G30.0 DEMENTIA OF THE ALZHEIMER'S TYPE, WITH EARLY ONSET, WITH DEPRESSIVE MOOD (HCC): ICD-10-CM

## 2017-12-01 DIAGNOSIS — Z78.0 MENOPAUSE: ICD-10-CM

## 2017-12-01 DIAGNOSIS — F32.9 REACTIVE DEPRESSION: ICD-10-CM

## 2017-12-01 DIAGNOSIS — M15.9 PRIMARY OSTEOARTHRITIS INVOLVING MULTIPLE JOINTS: ICD-10-CM

## 2017-12-01 DIAGNOSIS — K21.9 GASTROESOPHAGEAL REFLUX DISEASE WITHOUT ESOPHAGITIS: ICD-10-CM

## 2017-12-01 DIAGNOSIS — F02.83 DEMENTIA OF THE ALZHEIMER'S TYPE, WITH EARLY ONSET, WITH DEPRESSIVE MOOD (HCC): ICD-10-CM

## 2017-12-01 DIAGNOSIS — F34.1 DYSTHYMIA: Primary | ICD-10-CM

## 2017-12-01 DIAGNOSIS — E78.2 MIXED HYPERLIPIDEMIA: ICD-10-CM

## 2017-12-01 DIAGNOSIS — F32.0 MILD SINGLE CURRENT EPISODE OF MAJOR DEPRESSIVE DISORDER (HCC): ICD-10-CM

## 2017-12-01 DIAGNOSIS — N95.2 ATROPHIC VAGINITIS: ICD-10-CM

## 2017-12-01 DIAGNOSIS — F02.818 LATE ONSET ALZHEIMER'S DISEASE WITH BEHAVIORAL DISTURBANCE (HCC): ICD-10-CM

## 2017-12-01 DIAGNOSIS — G30.1 LATE ONSET ALZHEIMER'S DISEASE WITH BEHAVIORAL DISTURBANCE (HCC): ICD-10-CM

## 2017-12-01 DIAGNOSIS — B00.9 HSV-1 INFECTION: ICD-10-CM

## 2017-12-01 DIAGNOSIS — R45.86 EMOTIONAL LABILITY: ICD-10-CM

## 2017-12-01 DIAGNOSIS — E11.69 DM TYPE 2 WITH DIABETIC DYSLIPIDEMIA (HCC): ICD-10-CM

## 2017-12-01 DIAGNOSIS — E78.5 DM TYPE 2 WITH DIABETIC DYSLIPIDEMIA (HCC): ICD-10-CM

## 2017-12-01 DIAGNOSIS — J01.01 ACUTE RECURRENT MAXILLARY SINUSITIS: ICD-10-CM

## 2017-12-01 DIAGNOSIS — B35.1 ONYCHOMYCOSIS: ICD-10-CM

## 2017-12-01 LAB
ALBUMIN SERPL-MCNC: 4.5 G/DL (ref 3.9–4.9)
ALP BLD-CCNC: 111 U/L (ref 40–130)
ALT SERPL-CCNC: 34 U/L (ref 0–33)
ANION GAP SERPL CALCULATED.3IONS-SCNC: 14 MEQ/L (ref 7–13)
AST SERPL-CCNC: 38 U/L (ref 0–35)
BILIRUB SERPL-MCNC: 0.3 MG/DL (ref 0–1.2)
BUN BLDV-MCNC: 12 MG/DL (ref 8–23)
CALCIUM SERPL-MCNC: 10.1 MG/DL (ref 8.6–10.2)
CHLORIDE BLD-SCNC: 100 MEQ/L (ref 98–107)
CO2: 28 MEQ/L (ref 22–29)
CREAT SERPL-MCNC: 0.45 MG/DL (ref 0.5–0.9)
GFR AFRICAN AMERICAN: >60
GFR NON-AFRICAN AMERICAN: >60
GLOBULIN: 2.5 G/DL (ref 2.3–3.5)
GLUCOSE BLD-MCNC: 209 MG/DL (ref 74–109)
GLUCOSE BLD-MCNC: 252 MG/DL
POTASSIUM SERPL-SCNC: 4 MEQ/L (ref 3.5–5.1)
SODIUM BLD-SCNC: 142 MEQ/L (ref 132–144)
TOTAL PROTEIN: 7 G/DL (ref 6.4–8.1)

## 2017-12-01 PROCEDURE — 3014F SCREEN MAMMO DOC REV: CPT | Performed by: FAMILY MEDICINE

## 2017-12-01 PROCEDURE — 3017F COLORECTAL CA SCREEN DOC REV: CPT | Performed by: FAMILY MEDICINE

## 2017-12-01 PROCEDURE — G8484 FLU IMMUNIZE NO ADMIN: HCPCS | Performed by: FAMILY MEDICINE

## 2017-12-01 PROCEDURE — G8399 PT W/DXA RESULTS DOCUMENT: HCPCS | Performed by: FAMILY MEDICINE

## 2017-12-01 PROCEDURE — 4040F PNEUMOC VAC/ADMIN/RCVD: CPT | Performed by: FAMILY MEDICINE

## 2017-12-01 PROCEDURE — 99214 OFFICE O/P EST MOD 30 MIN: CPT | Performed by: FAMILY MEDICINE

## 2017-12-01 PROCEDURE — 82962 GLUCOSE BLOOD TEST: CPT | Performed by: FAMILY MEDICINE

## 2017-12-01 PROCEDURE — G8427 DOCREV CUR MEDS BY ELIG CLIN: HCPCS | Performed by: FAMILY MEDICINE

## 2017-12-01 PROCEDURE — 1036F TOBACCO NON-USER: CPT | Performed by: FAMILY MEDICINE

## 2017-12-01 PROCEDURE — G8419 CALC BMI OUT NRM PARAM NOF/U: HCPCS | Performed by: FAMILY MEDICINE

## 2017-12-01 PROCEDURE — 3044F HG A1C LEVEL LT 7.0%: CPT | Performed by: FAMILY MEDICINE

## 2017-12-01 PROCEDURE — G8599 NO ASA/ANTIPLAT THER USE RNG: HCPCS | Performed by: FAMILY MEDICINE

## 2017-12-01 PROCEDURE — 1123F ACP DISCUSS/DSCN MKR DOCD: CPT | Performed by: FAMILY MEDICINE

## 2017-12-01 PROCEDURE — 1090F PRES/ABSN URINE INCON ASSESS: CPT | Performed by: FAMILY MEDICINE

## 2017-12-01 RX ORDER — GLIPIZIDE 5 MG/1
TABLET, FILM COATED, EXTENDED RELEASE ORAL
Qty: 60 TABLET | Refills: 5 | Status: SHIPPED | OUTPATIENT
Start: 2017-12-01 | End: 2018-05-22 | Stop reason: SDUPTHER

## 2017-12-01 RX ORDER — CITALOPRAM 10 MG/1
TABLET ORAL
Qty: 30 TABLET | Refills: 3 | Status: CANCELLED | OUTPATIENT
Start: 2017-12-01

## 2017-12-01 RX ORDER — ACYCLOVIR 400 MG/1
TABLET ORAL
Qty: 90 TABLET | Refills: 1 | Status: SHIPPED | OUTPATIENT
Start: 2017-12-01 | End: 2018-03-19 | Stop reason: SDUPTHER

## 2017-12-01 RX ORDER — SIMVASTATIN 40 MG
TABLET ORAL
Qty: 90 TABLET | Refills: 1 | Status: SHIPPED | OUTPATIENT
Start: 2017-12-01 | End: 2018-01-12 | Stop reason: SDUPTHER

## 2017-12-01 RX ORDER — BUPROPION HYDROCHLORIDE 150 MG/1
TABLET, EXTENDED RELEASE ORAL
Qty: 180 TABLET | Refills: 1 | Status: SHIPPED | OUTPATIENT
Start: 2017-12-01 | End: 2018-01-12 | Stop reason: SDUPTHER

## 2017-12-01 RX ORDER — LISINOPRIL 10 MG/1
TABLET ORAL
Qty: 30 TABLET | Refills: 5 | Status: SHIPPED | OUTPATIENT
Start: 2017-12-01 | End: 2018-01-12 | Stop reason: SDUPTHER

## 2017-12-01 RX ORDER — CELECOXIB 100 MG/1
100 CAPSULE ORAL 2 TIMES DAILY
Qty: 60 CAPSULE | Refills: 3 | Status: CANCELLED | OUTPATIENT
Start: 2017-12-01

## 2017-12-01 RX ORDER — FLUTICASONE PROPIONATE 50 MCG
SPRAY, SUSPENSION (ML) NASAL
Qty: 3 BOTTLE | Refills: 5 | Status: SHIPPED | OUTPATIENT
Start: 2017-12-01 | End: 2018-01-12 | Stop reason: SDUPTHER

## 2017-12-01 RX ORDER — DONEPEZIL HYDROCHLORIDE 10 MG/1
20 TABLET, FILM COATED ORAL NIGHTLY
Qty: 60 TABLET | Refills: 5 | Status: SHIPPED | OUTPATIENT
Start: 2017-12-01 | End: 2018-02-09

## 2017-12-01 RX ORDER — DICLOFENAC SODIUM 75 MG/1
TABLET, DELAYED RELEASE ORAL
Qty: 180 TABLET | Refills: 1 | Status: SHIPPED | OUTPATIENT
Start: 2017-12-01 | End: 2018-01-12 | Stop reason: SDUPTHER

## 2017-12-01 RX ORDER — CITALOPRAM 20 MG/1
20 TABLET ORAL DAILY
Qty: 30 TABLET | Refills: 5 | Status: SHIPPED | OUTPATIENT
Start: 2017-12-01 | End: 2018-02-09

## 2017-12-01 RX ORDER — FLUCONAZOLE 100 MG/1
TABLET ORAL
Qty: 30 TABLET | Refills: 3 | Status: SHIPPED | OUTPATIENT
Start: 2017-12-01 | End: 2018-02-09

## 2017-12-01 RX ORDER — PIOGLITAZONEHYDROCHLORIDE 30 MG/1
TABLET ORAL
Qty: 90 TABLET | Refills: 1 | Status: SHIPPED | OUTPATIENT
Start: 2017-12-01 | End: 2018-01-12 | Stop reason: SDUPTHER

## 2017-12-01 RX ORDER — MEMANTINE HYDROCHLORIDE 10 MG/1
10 TABLET ORAL 2 TIMES DAILY
Qty: 60 TABLET | Refills: 5 | Status: SHIPPED | OUTPATIENT
Start: 2017-12-01 | End: 2018-01-12 | Stop reason: SDUPTHER

## 2017-12-01 RX ORDER — OMEPRAZOLE 20 MG/1
CAPSULE, DELAYED RELEASE ORAL
Qty: 90 CAPSULE | Refills: 1 | Status: CANCELLED | OUTPATIENT
Start: 2017-12-01

## 2017-12-01 RX ORDER — HYDROXYZINE HYDROCHLORIDE 10 MG/1
10 TABLET, FILM COATED ORAL DAILY PRN
Qty: 100 TABLET | Refills: 1 | Status: SHIPPED | OUTPATIENT
Start: 2017-12-01 | End: 2017-12-21

## 2017-12-01 ASSESSMENT — ENCOUNTER SYMPTOMS
COUGH: 0
DIARRHEA: 0
VISUAL CHANGE: 0
SHORTNESS OF BREATH: 0
BACK PAIN: 0
ABDOMINAL PAIN: 0
NAUSEA: 0
CONSTIPATION: 0
SINUS PRESSURE: 0
RESPIRATORY NEGATIVE: 1
SORE THROAT: 0
VOMITING: 0
WHEEZING: 0

## 2017-12-01 NOTE — PROGRESS NOTES
Subjective:      Patient ID: Calista Jefferson is a 69 y.o. female who presents today for:  Chief Complaint   Patient presents with    Foot Pain     Pt. is here for a f/u on left foot pain. Pt. states that she still has the pain but has gotten better. Pt. states the pain is aching. Pt. rates the pain as 2-3/10. Pt. has taken Tylenol with some relief.  Diabetes     Pt. is here for a f/u on DM. Pt. doesn't check her sugar at home. Pt. is currently taking Glipizide. Foot Pain   This is a new problem. The current episode started 1 to 4 weeks ago. The problem occurs constantly. The problem has been unchanged. Associated symptoms include arthralgias. Pertinent negatives include no abdominal pain, chest pain, chills, congestion, coughing, fatigue, fever, headaches, joint swelling, nausea, neck pain, sore throat, visual change, vomiting or weakness. The symptoms are aggravated by walking. She has tried acetaminophen for the symptoms. The treatment provided mild relief. Diabetes   She presents for her follow-up diabetic visit. She has type 2 diabetes mellitus. Pertinent negatives for hypoglycemia include no dizziness, headaches, sweats or tremors. Pertinent negatives for diabetes include no chest pain, no fatigue, no visual change, no weakness and no weight loss. There are no hypoglycemic complications. Pertinent negatives for diabetic complications include no autonomic neuropathy, CVA, heart disease, PVD or retinopathy. Risk factors for coronary artery disease include dyslipidemia, diabetes mellitus, stress and hypertension. Current diabetic treatment includes oral agent (monotherapy). She is following a generally healthy diet. She rarely participates in exercise.        Past Medical History:   Diagnosis Date    Allergic rhinitis     Chronic sinusitis     Depression     Hx of blood clots     leg    Hyperlipidemia     Hypertension     Menopause     Thyroid disease     Tobacco abuse     Type II or Positive for arthralgias and gait problem. Negative for back pain, joint swelling and neck pain. Left foot pain    Neurological: Negative for dizziness, tremors, weakness and headaches. Objective:   /66 (Site: Right Arm, Position: Sitting, Cuff Size: Medium Adult)   Pulse 99   Temp 98.2 °F (36.8 °C) (Oral)   Resp 14   Ht 5' 5\" (1.651 m)   Wt 152 lb (68.9 kg)   SpO2 97%   BMI 25.29 kg/m²     Physical Exam   Constitutional: She is oriented to person, place, and time. She appears well-developed and well-nourished. HENT:   Head: Normocephalic and atraumatic. Eyes: Conjunctivae and EOM are normal. Pupils are equal, round, and reactive to light. Neck: Normal range of motion. Neck supple. No thyromegaly present. Cardiovascular: Normal rate, regular rhythm and normal heart sounds. No murmur heard. Pulmonary/Chest: Effort normal and breath sounds normal. She has no wheezes. She exhibits no tenderness. Abdominal: Soft. Bowel sounds are normal. There is no tenderness. Musculoskeletal: Normal range of motion. She exhibits no edema or tenderness. Left foot: There is bony tenderness. There is no swelling. Lymphadenopathy:     She has no cervical adenopathy. Neurological: She is alert and oriented to person, place, and time. She has normal reflexes. Coordination normal.   Skin: Skin is warm and dry. No rash noted. Psychiatric: Thought content normal. Her affect is blunt. Her speech is delayed. She is slowed. Cognition and memory are impaired. She exhibits abnormal recent memory and abnormal remote memory. Nursing note and vitals reviewed. Assessment:     1. Dysthymia     2. DM type 2 with diabetic dyslipidemia (HCC)  glipiZIDE (GLIPIZIDE XL) 5 MG extended release tablet    pioglitazone (ACTOS) 30 MG tablet    POCT Glucose   3. Onychomycosis  fluconazole (DIFLUCAN) 100 MG tablet   4.  HTN (hypertension), benign  lisinopril (PRINIVIL;ZESTRIL) 10 MG tablet    Comprehensive Metabolic Panel   5. Mild single current episode of major depressive disorder ([facility] [address]     6. Acute recurrent maxillary sinusitis  hydrOXYzine (ATARAX) 10 MG tablet    fluticasone (FLONASE) 50 MCG/ACT nasal spray   7. HSV-1 infection  acyclovir (ZOVIRAX) 400 MG tablet   8. Primary osteoarthritis involving multiple joints  diclofenac (VOLTAREN) 75 MG EC tablet   9. Dementia of the Alzheimer's type, with early onset, with depressive mood  memantine (NAMENDA) 10 MG tablet   10. Emotional lability     11. Late onset Alzheimer's disease with behavioral disturbance  donepezil (ARICEPT) 10 MG tablet   12. Mixed hyperlipidemia  simvastatin (ZOCOR) 40 MG tablet   13. Reactive depression  buPROPion (WELLBUTRIN SR) 150 MG extended release tablet   14. Gastroesophageal reflux disease without esophagitis     15.  Menopause  Mariela Guadarrama MD   16. Atrophic vaginitis  Mariela Guadarraam MD       Plan:      Orders Placed This Encounter   Procedures    Comprehensive Metabolic Panel     Standing Status:   Future     Number of Occurrences:   1     Standing Expiration Date:   1/1/2018   Mariela Guadarrama MD     Referral Priority:   Routine     Referral Type:   Consult for Advice and Opinion     Referral Reason:   Specialty Services Required     Referred to Provider:   Royer Saldivar MD     Requested Specialty:   Obstetrics & Gynecology     Number of Visits Requested:   1    POCT Glucose     Orders Placed This Encounter   Medications    glipiZIDE (GLIPIZIDE XL) 5 MG extended release tablet     Sig: TAKE ONE TABLET  bid     Dispense:  60 tablet     Refill:  5    fluconazole (DIFLUCAN) 100 MG tablet     Sig: TAKE ONE TABLET BY MOUTH EVERY DAY     Dispense:  30 tablet     Refill:  3    lisinopril (PRINIVIL;ZESTRIL) 10 MG tablet     Sig: One daily     Dispense:  30 tablet     Refill:  5    hydrOXYzine (ATARAX) 10 MG tablet     Sig: Take 1 tablet by mouth daily as needed for Itching     Dispense:  100 tablet     Refill:  1    acyclovir (ZOVIRAX) 400 MG tablet     Sig: TAKE ONE TABLET BY MOUTH DAILY     Dispense:  90 tablet     Refill:  1    pioglitazone (ACTOS) 30 MG tablet     Sig: TAKE ONE TABLET BY MOUTH EVERY DAY     Dispense:  90 tablet     Refill:  1    diclofenac (VOLTAREN) 75 MG EC tablet     Sig: Take 1 tablet by mouth  twice a day with meals     Dispense:  180 tablet     Refill:  1    fluticasone (FLONASE) 50 MCG/ACT nasal spray     Sig: Use 2 sprays nasally daily     Dispense:  3 Bottle     Refill:  5    memantine (NAMENDA) 10 MG tablet     Sig: Take 1 tablet by mouth 2 times daily     Dispense:  60 tablet     Refill:  5    donepezil (ARICEPT) 10 MG tablet     Sig: Take 2 tablets by mouth nightly     Dispense:  60 tablet     Refill:  5    simvastatin (ZOCOR) 40 MG tablet     Sig: Take 1 tablet by mouth  nightly     Dispense:  90 tablet     Refill:  1    buPROPion (WELLBUTRIN SR) 150 MG extended release tablet     Sig: Take 1 tablet by mouth  twice a day     Dispense:  180 tablet     Refill:  1    citalopram (CELEXA) 20 MG tablet     Sig: Take 1 tablet by mouth daily     Dispense:  30 tablet     Refill:  5       Controlled Substances Monitoring:      Return in about 6 weeks (around 1/12/2018) for Review of Labs & Diagnostic Testing, Routine follow up. I, Zechariah Malik ([address] [place] [address])  , am scribing for and in the presence of Margarette Mcintyre DO. Electronically signed by :  Zechariah Malik ([address] [place] [address])    Saqib Zhang DO, personally performed the services described in this documentation, as scribed by Sanam Oneill in my presence, and it is both accurate and complete.  Electronically signed by: Margarette Mcintyre DO    12/6/17 11:18 PM    Margarette Mcintyre DO

## 2017-12-06 ENCOUNTER — OFFICE VISIT (OUTPATIENT)
Dept: BEHAVIORAL/MENTAL HEALTH | Age: 67
End: 2017-12-06

## 2017-12-06 DIAGNOSIS — F34.1 DYSTHYMIA: Primary | ICD-10-CM

## 2017-12-06 PROCEDURE — 90832 PSYTX W PT 30 MINUTES: CPT | Performed by: PSYCHOLOGIST

## 2017-12-06 ASSESSMENT — PATIENT HEALTH QUESTIONNAIRE - PHQ9
4. FEELING TIRED OR HAVING LITTLE ENERGY: 1
SUM OF ALL RESPONSES TO PHQ9 QUESTIONS 1 & 2: 1
10. IF YOU CHECKED OFF ANY PROBLEMS, HOW DIFFICULT HAVE THESE PROBLEMS MADE IT FOR YOU TO DO YOUR WORK, TAKE CARE OF THINGS AT HOME, OR GET ALONG WITH OTHER PEOPLE: 0
2. FEELING DOWN, DEPRESSED OR HOPELESS: 0
SUM OF ALL RESPONSES TO PHQ QUESTIONS 1-9: 5
1. LITTLE INTEREST OR PLEASURE IN DOING THINGS: 1
6. FEELING BAD ABOUT YOURSELF - OR THAT YOU ARE A FAILURE OR HAVE LET YOURSELF OR YOUR FAMILY DOWN: 1
5. POOR APPETITE OR OVEREATING: 0
7. TROUBLE CONCENTRATING ON THINGS, SUCH AS READING THE NEWSPAPER OR WATCHING TELEVISION: 1
9. THOUGHTS THAT YOU WOULD BE BETTER OFF DEAD, OR OF HURTING YOURSELF: 0
3. TROUBLE FALLING OR STAYING ASLEEP: 1
8. MOVING OR SPEAKING SO SLOWLY THAT OTHER PEOPLE COULD HAVE NOTICED. OR THE OPPOSITE, BEING SO FIGETY OR RESTLESS THAT YOU HAVE BEEN MOVING AROUND A LOT MORE THAN USUAL: 0

## 2017-12-06 NOTE — PROGRESS NOTES
Sig Dispense Refill    glipiZIDE (GLIPIZIDE XL) 5 MG extended release tablet TAKE ONE TABLET  bid 60 tablet 5    fluconazole (DIFLUCAN) 100 MG tablet TAKE ONE TABLET BY MOUTH EVERY DAY 30 tablet 3    lisinopril (PRINIVIL;ZESTRIL) 10 MG tablet One daily 30 tablet 5    hydrOXYzine (ATARAX) 10 MG tablet Take 1 tablet by mouth daily as needed for Itching 100 tablet 1    acyclovir (ZOVIRAX) 400 MG tablet TAKE ONE TABLET BY MOUTH DAILY 90 tablet 1    pioglitazone (ACTOS) 30 MG tablet TAKE ONE TABLET BY MOUTH EVERY DAY 90 tablet 1    diclofenac (VOLTAREN) 75 MG EC tablet Take 1 tablet by mouth  twice a day with meals 180 tablet 1    fluticasone (FLONASE) 50 MCG/ACT nasal spray Use 2 sprays nasally daily 3 Bottle 5    memantine (NAMENDA) 10 MG tablet Take 1 tablet by mouth 2 times daily 60 tablet 5    donepezil (ARICEPT) 10 MG tablet Take 2 tablets by mouth nightly 60 tablet 5    simvastatin (ZOCOR) 40 MG tablet Take 1 tablet by mouth  nightly 90 tablet 1    buPROPion (WELLBUTRIN SR) 150 MG extended release tablet Take 1 tablet by mouth  twice a day 180 tablet 1    citalopram (CELEXA) 20 MG tablet Take 1 tablet by mouth daily 30 tablet 5    citalopram (CELEXA) 10 MG tablet TAKE ONE TABLET BY MOUTH DAILY 30 tablet 3    dextromethorphan-quiNIDine (NUEDEXTA) 20-10 MG CAPS per capsule Take 1 capsule by mouth daily 30 capsule 3    Tuberculin-Allergy Syringes 28G X 1/2\" 1 ML MISC 1 each by Does not apply route daily as needed (as prescribed) 100 each 2    Calcium Citrate-Vitamin D (CALCIUM + D PO) Take by mouth      Fexofenadine HCl (ALLEGRA PO) Take by mouth      celecoxib (CELEBREX) 100 MG capsule Take 1 capsule by mouth 2 times daily 60 capsule 3    omeprazole (PRILOSEC) 20 MG delayed release capsule Take 1 capsule by mouth  daily (Patient taking differently: as needed Take 1 capsule by mouth  daily) 90 capsule 1    Multiple Vitamin (MULTIVITAMIN PO) Take  by mouth daily.          No current facility-administered medications for this visit. Social History:   Social History     Social History    Marital status:      Spouse name: N/A    Number of children: N/A    Years of education: N/A     Occupational History    Not on file. Social History Main Topics    Smoking status: Former Smoker     Packs/day: 1.50     Years: 20.00     Quit date: 1/1/1996    Smokeless tobacco: Never Used    Alcohol use 1.0 oz/week     2 Standard drinks or equivalent per week    Drug use: No    Sexual activity: Not on file     Other Topics Concern    Not on file     Social History Narrative    No narrative on file         Family History:   Family History   Problem Relation Age of Onset    Diabetes Mother     High Blood Pressure Sister        TOBACCO:   reports that she quit smoking about 21 years ago. She has a 30.00 pack-year smoking history. She has never used smokeless tobacco.  ETOH:   reports that she drinks about 1.0 oz of alcohol per week . A:  Administered the PHQ-9, scores indicate a 1 point increase in symptoms, symptoms remain in the minimal depression range. Pt would likely benefit from continued [facility] services to increase coping skills and provide symptom control and relief.           PHQ Scores 10/19/2017 10/5/2017 9/21/2017 7/11/2017 6/8/2017 3/10/2017 1/15/2016   PHQ2 Score 1 0 1 2 3 2 0   PHQ9 Score 4 3 2 8 7 8 0     Interpretation of Total Score Depression Severity: 1-4 = Minimal depression, 5-9 = Mild depression, 10-14 = Moderate depression, 15-19 = Moderately severe depression, 20-27 = Severe depression      Diagnosis:    Dysthymia with anxious distress       Plan:  Pt interventions:  [place]-setting to identify pt's primary goals for [facility] visit / overall health, Supportive techniques, Emphasized self-care as important for managing overall health, Identified relevant behavioral strategies for targeting diabetes management including discussing steps pt would need to take to resume checking her blood sugar, Problem-solving re: helping pt determine what she can delegate and how to pace herself and Motivational Interviewing to target pt's motivation to check her blood sugar. Pt Behavioral Change Plan:  Return in 2 weeks.

## 2017-12-06 NOTE — PROGRESS NOTES
Behavioral Health Consultation  Bennie Vera PsyD. Psychologist  12/6/17  11:39 AM      Time spent with Patient: 25 minutes  This is patient's 11th  [facility] appointment. Reason for Consult:  depression, anxiety and memory loss  Referring Provider: Chay Mortensen DO  [address] [facility], [address] [facility]    Feedback given to PCP.     S:  ***        Diagnosis Date    Allergic rhinitis     Chronic sinusitis     Depression     Hx of blood clots     leg    Hyperlipidemia     Hypertension     Menopause     Thyroid disease     Tobacco abuse     Type II or unspecified type diabetes mellitus without mention of complication, not stated as uncontrolled        O:  MSE:    Appearance    {GENERAL APPEARANCE:75606}  Appetite {Desc; normal/:20246}  Sleep disturbance {YES / NO:19727}  Fatigue {YES / NO:19727}  Loss of pleasure {YES / EN:99991}  Impulsive behavior {YES / EW:80370}  Speech    {JBDFGK:305074548}  Mood    {[facility] AFFECT/MOOD:6485747753}  Affect    {EXAM; PSYCH DAILFT:42967}  Thought Content    {EXAM; PSYCH THOUGHT CONTENT FA:03329}  Thought Process    {THOUGHT PROCESS:062946285}  Associations    {Findings; psych thought process exam:12807::\"logical connections\"}  Insight    {Assessment:8694293108}  Judgment    {FINDINGS; OT UE INTACT/IMPAIRED:0517838981}  Orientation    {Exam; orientation:22784}  Memory    {EXAM; NEURO PED HHNWVF:01416}  Attention/Concentration    {Desc; intact/impaired:50381}  Morbid ideation {YES / UE:75519}  Suicide Assessment    {SUICIDE:442667407}      History:    Medications:   Current Outpatient Prescriptions   Medication Sig Dispense Refill    glipiZIDE (GLIPIZIDE XL) 5 MG extended release tablet TAKE ONE TABLET  bid 60 tablet 5    fluconazole (DIFLUCAN) 100 MG tablet TAKE ONE TABLET BY MOUTH EVERY DAY 30 tablet 3    lisinopril (PRINIVIL;ZESTRIL) 10 MG tablet One daily 30 tablet 5    hydrOXYzine (ATARAX) 10 MG tablet Take 1 tablet by mouth daily as needed for Itching 100 tablet 1    acyclovir (ZOVIRAX) 400 MG tablet TAKE ONE TABLET BY MOUTH DAILY 90 tablet 1    pioglitazone (ACTOS) 30 MG tablet TAKE ONE TABLET BY MOUTH EVERY DAY 90 tablet 1    diclofenac (VOLTAREN) 75 MG EC tablet Take 1 tablet by mouth  twice a day with meals 180 tablet 1    fluticasone (FLONASE) 50 MCG/ACT nasal spray Use 2 sprays nasally daily 3 Bottle 5    memantine (NAMENDA) 10 MG tablet Take 1 tablet by mouth 2 times daily 60 tablet 5    donepezil (ARICEPT) 10 MG tablet Take 2 tablets by mouth nightly 60 tablet 5    simvastatin (ZOCOR) 40 MG tablet Take 1 tablet by mouth  nightly 90 tablet 1    buPROPion (WELLBUTRIN SR) 150 MG extended release tablet Take 1 tablet by mouth  twice a day 180 tablet 1    citalopram (CELEXA) 20 MG tablet Take 1 tablet by mouth daily 30 tablet 5    citalopram (CELEXA) 10 MG tablet TAKE ONE TABLET BY MOUTH DAILY 30 tablet 3    dextromethorphan-quiNIDine (NUEDEXTA) 20-10 MG CAPS per capsule Take 1 capsule by mouth daily 30 capsule 3    Tuberculin-Allergy Syringes 28G X 1/2\" 1 ML MISC 1 each by Does not apply route daily as needed (as prescribed) 100 each 2    Calcium Citrate-Vitamin D (CALCIUM + D PO) Take by mouth      Fexofenadine HCl (ALLEGRA PO) Take by mouth      celecoxib (CELEBREX) 100 MG capsule Take 1 capsule by mouth 2 times daily 60 capsule 3    omeprazole (PRILOSEC) 20 MG delayed release capsule Take 1 capsule by mouth  daily (Patient taking differently: as needed Take 1 capsule by mouth  daily) 90 capsule 1    Multiple Vitamin (MULTIVITAMIN PO) Take  by mouth daily. No current facility-administered medications for this visit. Social History:   Social History     Social History    Marital status:      Spouse name: N/A    Number of children: N/A    Years of education: N/A     Occupational History    Not on file.      Social History Main Topics    Smoking status: Former Smoker     Packs/day: 1.50     Years: 20.00     Quit date: 1/1/1996    Smokeless tobacco: Never Used    Alcohol use 1.0 oz/week     2 Standard drinks or equivalent per week    Drug use: No    Sexual activity: Not on file     Other Topics Concern    Not on file     Social History Narrative    No narrative on file         Family History:   Family History   Problem Relation Age of Onset    Diabetes Mother     High Blood Pressure Sister        TOBACCO:   reports that she quit smoking about 21 years ago. She has a 30.00 pack-year smoking history. She has never used smokeless tobacco.  ETOH:   reports that she drinks about 1.0 oz of alcohol per week .      A:  ***    PHQ Scores 10/19/2017 10/5/2017 9/21/2017 7/11/2017 6/8/2017 3/10/2017 1/15/2016   PHQ2 Score 1 0 1 2 3 2 0   PHQ9 Score 4 3 2 8 7 8 0     Interpretation of Total Score Depression Severity: 1-4 = Minimal depression, 5-9 = Mild depression, 10-14 = Moderate depression, 15-19 = Moderately severe depression, 20-27 = Severe depression      Diagnosis:    {AXIS C:201327838}          Plan:  Pt interventions:  {Mental health remedies:19807}      Pt Behavioral Change Plan:

## 2017-12-11 ENCOUNTER — OFFICE VISIT (OUTPATIENT)
Dept: OBGYN | Age: 67
End: 2017-12-11

## 2017-12-11 VITALS
BODY MASS INDEX: 25.12 KG/M2 | HEIGHT: 65 IN | WEIGHT: 150.8 LBS | SYSTOLIC BLOOD PRESSURE: 118 MMHG | DIASTOLIC BLOOD PRESSURE: 78 MMHG

## 2017-12-11 DIAGNOSIS — Z11.51 SCREENING FOR HPV (HUMAN PAPILLOMAVIRUS): ICD-10-CM

## 2017-12-11 DIAGNOSIS — Z01.419 WELL WOMAN EXAM WITH ROUTINE GYNECOLOGICAL EXAM: Primary | ICD-10-CM

## 2017-12-11 PROCEDURE — G0101 CA SCREEN;PELVIC/BREAST EXAM: HCPCS | Performed by: OBSTETRICS & GYNECOLOGY

## 2017-12-11 RX ORDER — ERYTHROMYCIN 5 MG/G
OINTMENT OPHTHALMIC
Refills: 6 | COMMUNITY
Start: 2017-12-08 | End: 2018-01-12

## 2017-12-11 NOTE — PROGRESS NOTES
SUBJECTIVE:   69 y.o. Yoana Hoffmann female here for annual exam. Pt  and no complaints today. PT previously on HRT, premarin 0.3mg and pt stopped 5mos ago. PT may be interested in restarting HRT. Pt with h/o ALY. Review of Systems:  General ROS: negative  Psychological ROS: negative  ENT ROS: negative  Endocrine ROS: negative  Respiratory ROS: no cough, shortness of breath, or wheezing  Cardiovascular ROS: no chest pain or dyspnea on exertion  Gastrointestinal ROS: no abdominal pain, change in bowel habits, or black or bloody stools  Genito-Urinary ROS: no dysuria, trouble voiding, or hematuria  Musculoskeletal ROS: negative  Neurological ROS: no TIA or stroke symptoms  Dermatological ROS: negative    OBJECTIVE:   /78   Ht 5' 5\" (1.651 m)   Wt 150 lb 12.8 oz (68.4 kg)   BMI 25.09 kg/m²     Physical Exam:  CONSTITUTIONAL: She appears well nourished and developed   NEUROLOGIC: Alert and oriented to time, place and person  NECK: no thyroidmegaly  BREASTS: Bilateral breasts without masses, lesions or nipple discharge, bilateral surgical scars  LUNGS: Clear to ascultation bilaterally  CVS: regular rate and rhythm  LYMPHATIC: No palpable lymph nodes  ABDOMEN: benign, soft, nontender, no masses. No liver or splenic organomegaly. No evidence of abdominal or inguinal hernia. No indication for occult blood testing, surgical scars  SKIN: normal texture and tone, no lesions  NEURO: normal tone, no hyperreflexia, 1+DTRs throughout    Pelvic Exam:   EFG: normal external genitalia  URETHRAL MEATUS: normal size, no diverticula   URETHRA: normal appearing without diverticula or lesions  BLADDER:  No masses or tenderness  VAGINA: normal rugae, no discharge, well healed cuff  CERVIX: absent  UTERUS: absent  ADNEXA: normal adnexa in size, nontender and no masses. PERINEUM: normal appearing without lesions or masses  RECTUM: no hemorrhoids or rectal masses.      ASSESSMENT:   Routine gynecologic exam    PLAN:   Pap with hpv

## 2017-12-21 ENCOUNTER — OFFICE VISIT (OUTPATIENT)
Dept: PRIMARY CARE CLINIC | Age: 67
End: 2017-12-21

## 2017-12-21 VITALS
SYSTOLIC BLOOD PRESSURE: 136 MMHG | TEMPERATURE: 97.5 F | DIASTOLIC BLOOD PRESSURE: 64 MMHG | HEIGHT: 65 IN | BODY MASS INDEX: 25.33 KG/M2 | RESPIRATION RATE: 12 BRPM | WEIGHT: 152 LBS | HEART RATE: 88 BPM

## 2017-12-21 DIAGNOSIS — D51.9 ANEMIA DUE TO VITAMIN B12 DEFICIENCY, UNSPECIFIED B12 DEFICIENCY TYPE: ICD-10-CM

## 2017-12-21 DIAGNOSIS — F32.0 MILD SINGLE CURRENT EPISODE OF MAJOR DEPRESSIVE DISORDER (HCC): ICD-10-CM

## 2017-12-21 DIAGNOSIS — E78.5 DM TYPE 2 WITH DIABETIC DYSLIPIDEMIA (HCC): ICD-10-CM

## 2017-12-21 DIAGNOSIS — J06.9 ACUTE UPPER RESPIRATORY INFECTION: Primary | ICD-10-CM

## 2017-12-21 DIAGNOSIS — M79.672 FOOT PAIN, LEFT: ICD-10-CM

## 2017-12-21 DIAGNOSIS — E11.69 DM TYPE 2 WITH DIABETIC DYSLIPIDEMIA (HCC): ICD-10-CM

## 2017-12-21 DIAGNOSIS — R53.83 FATIGUE, UNSPECIFIED TYPE: ICD-10-CM

## 2017-12-21 DIAGNOSIS — I10 HTN (HYPERTENSION), BENIGN: ICD-10-CM

## 2017-12-21 LAB — GLUCOSE BLD-MCNC: 172 MG/DL

## 2017-12-21 PROCEDURE — G8599 NO ASA/ANTIPLAT THER USE RNG: HCPCS | Performed by: FAMILY MEDICINE

## 2017-12-21 PROCEDURE — 3017F COLORECTAL CA SCREEN DOC REV: CPT | Performed by: FAMILY MEDICINE

## 2017-12-21 PROCEDURE — 1036F TOBACCO NON-USER: CPT | Performed by: FAMILY MEDICINE

## 2017-12-21 PROCEDURE — 96372 THER/PROPH/DIAG INJ SC/IM: CPT | Performed by: FAMILY MEDICINE

## 2017-12-21 PROCEDURE — 82962 GLUCOSE BLOOD TEST: CPT | Performed by: FAMILY MEDICINE

## 2017-12-21 PROCEDURE — 4040F PNEUMOC VAC/ADMIN/RCVD: CPT | Performed by: FAMILY MEDICINE

## 2017-12-21 PROCEDURE — 1123F ACP DISCUSS/DSCN MKR DOCD: CPT | Performed by: FAMILY MEDICINE

## 2017-12-21 PROCEDURE — G8419 CALC BMI OUT NRM PARAM NOF/U: HCPCS | Performed by: FAMILY MEDICINE

## 2017-12-21 PROCEDURE — G8427 DOCREV CUR MEDS BY ELIG CLIN: HCPCS | Performed by: FAMILY MEDICINE

## 2017-12-21 PROCEDURE — 99214 OFFICE O/P EST MOD 30 MIN: CPT | Performed by: FAMILY MEDICINE

## 2017-12-21 PROCEDURE — 3044F HG A1C LEVEL LT 7.0%: CPT | Performed by: FAMILY MEDICINE

## 2017-12-21 PROCEDURE — G8399 PT W/DXA RESULTS DOCUMENT: HCPCS | Performed by: FAMILY MEDICINE

## 2017-12-21 PROCEDURE — 3014F SCREEN MAMMO DOC REV: CPT | Performed by: FAMILY MEDICINE

## 2017-12-21 PROCEDURE — 1090F PRES/ABSN URINE INCON ASSESS: CPT | Performed by: FAMILY MEDICINE

## 2017-12-21 PROCEDURE — G8484 FLU IMMUNIZE NO ADMIN: HCPCS | Performed by: FAMILY MEDICINE

## 2017-12-21 RX ORDER — CYANOCOBALAMIN 1000 UG/ML
1000 INJECTION INTRAMUSCULAR; SUBCUTANEOUS ONCE
Status: COMPLETED | OUTPATIENT
Start: 2017-12-21 | End: 2017-12-21

## 2017-12-21 RX ORDER — BENZONATATE 100 MG/1
100-200 CAPSULE ORAL 3 TIMES DAILY PRN
Qty: 60 CAPSULE | Refills: 0 | Status: SHIPPED | OUTPATIENT
Start: 2017-12-21 | End: 2018-01-12 | Stop reason: SDUPTHER

## 2017-12-21 RX ORDER — DOXYCYCLINE 100 MG/1
CAPSULE ORAL
Qty: 20 CAPSULE | Refills: 0 | Status: SHIPPED | OUTPATIENT
Start: 2017-12-21 | End: 2018-01-12

## 2017-12-21 RX ORDER — CEFTRIAXONE 500 MG/1
500 INJECTION, POWDER, FOR SOLUTION INTRAMUSCULAR; INTRAVENOUS ONCE
Status: COMPLETED | OUTPATIENT
Start: 2017-12-21 | End: 2017-12-21

## 2017-12-21 RX ORDER — HYDROXYZINE HYDROCHLORIDE 10 MG/1
TABLET, FILM COATED ORAL
Refills: 1 | COMMUNITY
Start: 2017-11-02 | End: 2018-01-12

## 2017-12-21 RX ORDER — CYANOCOBALAMIN 1000 UG/ML
1000 INJECTION INTRAMUSCULAR; SUBCUTANEOUS ONCE
Status: DISCONTINUED | OUTPATIENT
Start: 2017-12-21 | End: 2017-12-21

## 2017-12-21 RX ORDER — CITALOPRAM 10 MG/1
TABLET ORAL
Refills: 3 | COMMUNITY
Start: 2017-11-03 | End: 2018-01-12

## 2017-12-21 RX ADMIN — CYANOCOBALAMIN 1000 MCG: 1000 INJECTION INTRAMUSCULAR; SUBCUTANEOUS at 18:21

## 2017-12-21 RX ADMIN — CEFTRIAXONE 500 MG: 500 INJECTION, POWDER, FOR SOLUTION INTRAMUSCULAR; INTRAVENOUS at 18:20

## 2017-12-22 ENCOUNTER — TELEPHONE (OUTPATIENT)
Dept: PRIMARY CARE CLINIC | Age: 67
End: 2017-12-22

## 2017-12-22 RX ORDER — AMOXICILLIN 500 MG/1
TABLET, FILM COATED ORAL
Qty: 30 TABLET | Refills: 0 | Status: SHIPPED | OUTPATIENT
Start: 2017-12-22 | End: 2018-01-12

## 2017-12-22 NOTE — TELEPHONE ENCOUNTER
She said the doxycycline is not agreeing with her and wants tthis changed. It gives her terrible heartburn and she can't take any heartburn medicine with this. She uses giant Marybeth Curet.

## 2017-12-27 ENCOUNTER — CARE COORDINATION (OUTPATIENT)
Dept: CARE COORDINATION | Age: 67
End: 2017-12-27

## 2017-12-27 NOTE — LETTER
Tico Ortiz  [address] [place] [id number]      Dear Duane Eaton,    I hope this letter finds you doing well! I just wanted to follow up with you from our last contact. I am sending you additional diabetes educational materials to follow up from our last discussion. We encourage testing of morning blood sugar about once a week. I have enclosed symptoms of high and low blood sugar to keep as a reference. According to your last office visit your blood sugar is controlled, this information is just for reference. I hope you find this information helpful.          Sincerely,       Elsy Morrell RN, BSN   [address],  [facility] [place]  Direct line: [phone]  Office: [phone]  Fax: [phone]

## 2017-12-28 ENCOUNTER — TELEPHONE (OUTPATIENT)
Dept: PRIMARY CARE CLINIC | Age: 67
End: 2017-12-28

## 2017-12-28 NOTE — PATIENT INSTRUCTIONS
quick-sugar foods with 15 grams of carbohydrate:  · 3 or 4 glucose tablets  · 1 tube of glucose gel  · Hard candy (such as 3 Jolly Ranchers or 5 to 7 Life Savers)  · ½ cup to ¾ cup (4 to 6 ounces) of fruit juice or regular (not diet) soda  High blood sugar  If you have symptoms of high blood sugar, check your blood sugar. Your goal is to get your level back to your target range. If it's above ______ ( for example, above 250), follow these steps:  · If you missed a dose of your diabetes medicine, take it now. Take only the amount of medicine that you have been prescribed. Do not take more or less medicine. · Give yourself insulin if your doctor has prescribed it for high blood sugar. · Test for ketones, if the doctor told you to do so. If the results of the ketone test show a moderate-to-large amount of ketones, call the doctor for advice. · Wait 30 minutes after you take the extra insulin or the missed medicine. Check your blood sugar again. If your symptoms or blood sugar levels are getting worse or have not improved after taking these steps, seek medical care right away. Follow-up care is a key part of your treatment and safety. Be sure to make and go to all appointments, and call your doctor if you are having problems. It's also a good idea to know your test results and keep a list of the medicines you take. Where can you learn more? Go to [email or web address] and sign in to your [employer] account. Enter W306 in the [facility] box to learn more about \"Diabetes Blood Sugar Emergencies: Your Action Plan. \"     If you do not have an account, please click on the \"Sign Up Now\" link. Current as of: March 13, 2017  Content Version: 11.4  © 8490-1019 Healthwise, Incorporated. Care instructions adapted under license by [facility] ([facility]).  If you have questions about a medical condition or this instruction, always ask your healthcare professional. Lewisägen Tae any

## 2017-12-28 NOTE — CARE COORDINATION
Ambulatory Care Coordination Note  12/27/2018  CM Risk Score: 2  Johny Mortality Risk Score: 9.36    ACC: Chapis Rosa, RN    Summary Note: call to Tyra Pratt to discuss health coaching. She reports she has not had decline in condition and has been taking medication as prescribed. Diabetes Assessment    Medic Alert ID:  No  Meal Planning:  Avoidance of concentrated sweets   How often do you test your blood sugar?:  Daily   Do you have barriers with adherence to non-pharmacologic self-management interventions? (Nutrition/Exercise/Self-Monitoring):  No   Have you ever had to go to the ED for symptoms of low blood sugar?:  No       No patient-reported symptoms              Care Coordination Interventions    Program Enrollment:  Rising Risk  Referral from Primary Care Provider:  Yes  Suggested Interventions and Community Resources  BehavGeneral acute hospital Health:  Completed  Pharmacist:  Completed  Zone Management Tools:  Completed         Goals Addressed             Most Recent     Medication Management   On track (12/27/2017)             I will take my medication as directed. I will notify my provider of any problems with medications, like adverse effects or side effects. Barriers: impairment:  cognitive  Plan for overcoming my barriers:  will monitor meds  Confidence: 6/10  Anticipated Goal Completion Date: 3/30/2018            Prior to Admission medications    Medication Sig Start Date End Date Taking?  Authorizing Provider   Amoxicillin 500 MG TABS One tid 12/22/17   Marilyn Arily, DO   citalopram (CELEXA) 10 MG tablet TAKE ONE TABLET BY MOUTH EVERY DAY 11/3/17   Historical Provider, MD   hydrOXYzine (ATARAX) 10 MG tablet TAKE ONE TABLET BY MOUTH DAILY AS NEEDED FOR ITCHING 11/2/17   Historical Provider, MD   doxycycline monohydrate (MONODOX) 100 MG capsule One bid 12/21/17   Marilyn Joseumbly, DO   benzonatate (TESSALON PERLES) 100 MG capsule Take 1-2 capsules by mouth 3 times daily as needed for Cough 12/21/17 Britta Rosenberg,    diclofenac sodium (VOLTAREN) 1 % GEL Apply 2 g topically 2 times daily 12/21/17   Britta Rosenberg DO   erythromycin [facility]) 5 MG/GM ophthalmic ointment apply a small amount into both eyes every night as needed 12/8/17   Historical Provider, MD   glipiZIDE (GLIPIZIDE XL) 5 MG extended release tablet TAKE ONE TABLET  bid 12/1/17   Britta Rosenberg,    fluconazole (DIFLUCAN) 100 MG tablet TAKE ONE TABLET BY MOUTH EVERY DAY 12/1/17   Britta Rosenberg,    lisinopril (PRINIVIL;ZESTRIL) 10 MG tablet One daily 12/1/17   Britta Rosenberg,    acyclovir (ZOVIRAX) 400 MG tablet TAKE ONE TABLET BY MOUTH DAILY 12/1/17   Britta Rosenberg,    pioglitazone (ACTOS) 30 MG tablet TAKE ONE TABLET BY MOUTH EVERY DAY 12/1/17   Britta Rosenberg,    diclofenac (VOLTAREN) 75 MG EC tablet Take 1 tablet by mouth  twice a day with meals 12/1/17   Britta Rosenberg,    fluticasone [facility] [place]) 50 MCG/ACT nasal spray Use 2 sprays nasally daily 12/1/17   Britta Rosenberg DO   memantine (NAMENDA) 10 MG tablet Take 1 tablet by mouth 2 times daily 12/1/17   Britta Rosenberg,    donepezil (ARICEPT) 10 MG tablet Take 2 tablets by mouth nightly 12/1/17   Britta Rosenberg,    simvastatin (ZOCOR) 40 MG tablet Take 1 tablet by mouth  nightly 12/1/17   Britta Rosenberg,    buPROPion [facility] SR) 150 MG extended release tablet Take 1 tablet by mouth  twice a day 12/1/17   Britta Rosenberg,    citalopram (CELEXA) 20 MG tablet Take 1 tablet by mouth daily 12/1/17   Britta Rosenberg,    dextromethorphan-quiNIDine (NUEDEXTA) 20-10 MG CAPS per capsule Take 1 capsule by mouth daily 11/2/17   Britta Rosenberg,    Tuberculin-Allergy Syringes 28G X 1/2\" 1 ML MISC 1 each by Does not apply route daily as needed (as prescribed) 8/24/17   Britta Rosenberg, DO   Calcium Citrate-Vitamin D (CALCIUM + D PO) Take by mouth    Historical Provider, MD   Fexofenadine HCl (ALLEGRA PO) Take by mouth    Historical Provider, MD   celecoxib (CELEBREX) 100 MG capsule Take 1 capsule by mouth 2 times daily 5/12/17   Felicia Paradox, DO   omeprazole (PRILOSEC) 20 MG delayed release capsule Take 1 capsule by mouth  daily  Patient taking differently: as needed Take 1 capsule by mouth  daily 3/10/17   Felicia Paradox, DO   Multiple Vitamin (MULTIVITAMIN PO) Take  by mouth daily.       Historical Provider, MD       Future Appointments  Date Time Provider Rohan Waggoner   1/12/2018 1:15 PM [address]

## 2017-12-31 NOTE — PROGRESS NOTES
headache and neck pain    Percocet [Oxycodone-Acetaminophen]     Sulfa Antibiotics     Zithromax [Azithromycin] Diarrhea    Vicodin [Hydrocodone-Acetaminophen] Nausea And Vomiting     Social History     Social History    Marital status:      Spouse name: N/A    Number of children: N/A    Years of education: N/A     Occupational History    Not on file.      Social History Main Topics    Smoking status: Former Smoker     Packs/day: 1.50     Years: 20.00     Quit date: 1/1/1996    Smokeless tobacco: Never Used    Alcohol use 1.0 oz/week     2 Standard drinks or equivalent per week    Drug use: No    Sexual activity: Not Currently     Other Topics Concern    Not on file     Social History Narrative    No narrative on file     Family History   Problem Relation Age of Onset    Diabetes Mother     High Blood Pressure Sister      Current Outpatient Prescriptions on File Prior to Visit   Medication Sig Dispense Refill    erythromycin (ROMYCIN) 5 MG/GM ophthalmic ointment apply a small amount into both eyes every night as needed  6    glipiZIDE (GLIPIZIDE XL) 5 MG extended release tablet TAKE ONE TABLET  bid 60 tablet 5    fluconazole (DIFLUCAN) 100 MG tablet TAKE ONE TABLET BY MOUTH EVERY DAY 30 tablet 3    lisinopril (PRINIVIL;ZESTRIL) 10 MG tablet One daily 30 tablet 5    acyclovir (ZOVIRAX) 400 MG tablet TAKE ONE TABLET BY MOUTH DAILY 90 tablet 1    pioglitazone (ACTOS) 30 MG tablet TAKE ONE TABLET BY MOUTH EVERY DAY 90 tablet 1    diclofenac (VOLTAREN) 75 MG EC tablet Take 1 tablet by mouth  twice a day with meals 180 tablet 1    fluticasone (FLONASE) 50 MCG/ACT nasal spray Use 2 sprays nasally daily 3 Bottle 5    memantine (NAMENDA) 10 MG tablet Take 1 tablet by mouth 2 times daily 60 tablet 5    donepezil (ARICEPT) 10 MG tablet Take 2 tablets by mouth nightly 60 tablet 5    simvastatin (ZOCOR) 40 MG tablet Take 1 tablet by mouth  nightly 90 tablet 1    buPROPion (WELLBUTRIN SR) 150 MG heard.  Pulmonary/Chest: Effort normal and breath sounds normal. She has no wheezes. She exhibits no tenderness. Abdominal: Soft. Bowel sounds are normal. There is no tenderness. Musculoskeletal: Normal range of motion. She exhibits no edema or tenderness. Lymphadenopathy:     She has no cervical adenopathy. Neurological: She is alert and oriented to person, place, and time. She has normal reflexes. Coordination normal.   Skin: Skin is warm and dry. No rash noted. Psychiatric: She has a normal mood and affect. Thought content normal.   Nursing note and vitals reviewed. Assessment & Plan   1. Acute upper respiratory infection     2. DM type 2 with diabetic dyslipidemia (HCC)  POCT Glucose   3. Foot pain, left     4. HTN (hypertension), benign     5.  Fatigue, unspecified type  DISCONTINUED: cyanocobalamin injection 1,000 mcg     Orders Placed This Encounter   Procedures    POCT Glucose     Orders Placed This Encounter   Medications    doxycycline monohydrate (MONODOX) 100 MG capsule     Sig: One bid     Dispense:  20 capsule     Refill:  0    benzonatate (TESSALON PERLES) 100 MG capsule     Sig: Take 1-2 capsules by mouth 3 times daily as needed for Cough     Dispense:  60 capsule     Refill:  0    diclofenac sodium (VOLTAREN) 1 % GEL     Sig: Apply 2 g topically 2 times daily     Dispense:  1 Tube     Refill:  3    cefTRIAXone (ROCEPHIN) injection 500 mg    cyanocobalamin injection 1,000 mcg    DISCONTD: cyanocobalamin injection 1,000 mcg     Medications Discontinued During This Encounter   Medication Reason    hydrOXYzine (ATARAX) 10 MG tablet     cyanocobalamin injection 1,000 mcg        PATIENT COUNSELED TO CONTINUE ALL CURRENT MEDS     Outpatient Prescriptions Marked as Taking for the 12/21/17 encounter (Office Visit) with Hattie Regalado DO   Medication Sig Dispense Refill    citalopram (CELEXA) 10 MG tablet TAKE ONE TABLET BY MOUTH EVERY DAY  3    hydrOXYzine (ATARAX) 10 MG tablet TAKE ONE TABLET BY MOUTH DAILY AS NEEDED FOR ITCHING  1    doxycycline monohydrate (MONODOX) 100 MG capsule One bid 20 capsule 0    benzonatate (TESSALON PERLES) 100 MG capsule Take 1-2 capsules by mouth 3 times daily as needed for Cough 60 capsule 0    diclofenac sodium (VOLTAREN) 1 % GEL Apply 2 g topically 2 times daily 1 Tube 3    erythromycin (ROMYCIN) 5 MG/GM ophthalmic ointment apply a small amount into both eyes every night as needed  6    glipiZIDE (GLIPIZIDE XL) 5 MG extended release tablet TAKE ONE TABLET  bid 60 tablet 5    fluconazole (DIFLUCAN) 100 MG tablet TAKE ONE TABLET BY MOUTH EVERY DAY 30 tablet 3    lisinopril (PRINIVIL;ZESTRIL) 10 MG tablet One daily 30 tablet 5    acyclovir (ZOVIRAX) 400 MG tablet TAKE ONE TABLET BY MOUTH DAILY 90 tablet 1    pioglitazone (ACTOS) 30 MG tablet TAKE ONE TABLET BY MOUTH EVERY DAY 90 tablet 1    diclofenac (VOLTAREN) 75 MG EC tablet Take 1 tablet by mouth  twice a day with meals 180 tablet 1    fluticasone (FLONASE) 50 MCG/ACT nasal spray Use 2 sprays nasally daily 3 Bottle 5    memantine (NAMENDA) 10 MG tablet Take 1 tablet by mouth 2 times daily 60 tablet 5    donepezil (ARICEPT) 10 MG tablet Take 2 tablets by mouth nightly 60 tablet 5    simvastatin (ZOCOR) 40 MG tablet Take 1 tablet by mouth  nightly 90 tablet 1    buPROPion (WELLBUTRIN SR) 150 MG extended release tablet Take 1 tablet by mouth  twice a day 180 tablet 1    citalopram (CELEXA) 20 MG tablet Take 1 tablet by mouth daily 30 tablet 5    dextromethorphan-quiNIDine (NUEDEXTA) 20-10 MG CAPS per capsule Take 1 capsule by mouth daily 30 capsule 3    Tuberculin-Allergy Syringes 28G X 1/2\" 1 ML MISC 1 each by Does not apply route daily as needed (as prescribed) 100 each 2    Calcium Citrate-Vitamin D (CALCIUM + D PO) Take by mouth      Fexofenadine HCl (ALLEGRA PO) Take by mouth      celecoxib (CELEBREX) 100 MG capsule Take 1 capsule by mouth 2 times daily 60 capsule 3    omeprazole (PRILOSEC) 20 MG delayed release capsule Take 1 capsule by mouth  daily (Patient taking differently: as needed Take 1 capsule by mouth  daily) 90 capsule 1    Multiple Vitamin (MULTIVITAMIN PO) Take  by mouth daily. HEALTH MAINTENANCE LIST REVIEWED WITH PATIENT  Health Maintenance   Topic Date Due    Diabetic retinal exam  03/02/2017    Diabetic microalbuminuria test  05/12/2018    Diabetic foot exam  07/28/2018    Lipid screen  09/18/2018    Diabetic hemoglobin A1C test  11/02/2018    Breast cancer screen  09/12/2019    Colon cancer screen colonoscopy  07/21/2020    DTaP/Tdap/Td vaccine (2 - Td) 09/24/2023    Zostavax vaccine  Completed    DEXA (modify frequency per FRAX score)  Addressed    Flu vaccine  Completed    Pneumococcal low/med risk  Completed    Hepatitis C screen  [address] Maintenance Due   Topic Date Due    Diabetic retinal exam  03/02/2017         Patient was advised importance proper diet/nutrition addition adequate hydration. Patient follow-up with us as scheduled. Return in about 5 weeks (around 1/25/2018) for Review of Labs & Diagnostic Testing, Routine follow up.     Esme Chaudhry DO

## 2018-01-05 DIAGNOSIS — Z01.419 WELL WOMAN EXAM WITH ROUTINE GYNECOLOGICAL EXAM: ICD-10-CM

## 2018-01-05 DIAGNOSIS — Z11.51 SCREENING FOR HPV (HUMAN PAPILLOMAVIRUS): ICD-10-CM

## 2018-01-12 ENCOUNTER — CARE COORDINATOR VISIT (OUTPATIENT)
Dept: CARE COORDINATION | Age: 68
End: 2018-01-12

## 2018-01-12 ENCOUNTER — OFFICE VISIT (OUTPATIENT)
Dept: PRIMARY CARE CLINIC | Age: 68
End: 2018-01-12

## 2018-01-12 VITALS
WEIGHT: 154.6 LBS | HEART RATE: 64 BPM | SYSTOLIC BLOOD PRESSURE: 118 MMHG | DIASTOLIC BLOOD PRESSURE: 62 MMHG | HEIGHT: 66 IN | TEMPERATURE: 97.8 F | RESPIRATION RATE: 14 BRPM | BODY MASS INDEX: 24.85 KG/M2

## 2018-01-12 DIAGNOSIS — F32.9 REACTIVE DEPRESSION: Primary | ICD-10-CM

## 2018-01-12 DIAGNOSIS — E78.5 DM TYPE 2 WITH DIABETIC DYSLIPIDEMIA (HCC): ICD-10-CM

## 2018-01-12 DIAGNOSIS — G30.0 DEMENTIA OF THE ALZHEIMER'S TYPE, WITH EARLY ONSET, WITH DEPRESSIVE MOOD (HCC): ICD-10-CM

## 2018-01-12 DIAGNOSIS — E78.2 MIXED HYPERLIPIDEMIA: ICD-10-CM

## 2018-01-12 DIAGNOSIS — F02.818 LATE ONSET ALZHEIMER'S DISEASE WITH BEHAVIORAL DISTURBANCE (HCC): ICD-10-CM

## 2018-01-12 DIAGNOSIS — M25.561 ACUTE PAIN OF RIGHT KNEE: ICD-10-CM

## 2018-01-12 DIAGNOSIS — B35.1 ONYCHOMYCOSIS: ICD-10-CM

## 2018-01-12 DIAGNOSIS — F02.83 DEMENTIA OF THE ALZHEIMER'S TYPE, WITH EARLY ONSET, WITH DEPRESSIVE MOOD (HCC): ICD-10-CM

## 2018-01-12 DIAGNOSIS — G30.1 LATE ONSET ALZHEIMER'S DISEASE WITH BEHAVIORAL DISTURBANCE (HCC): ICD-10-CM

## 2018-01-12 DIAGNOSIS — F02.80 DEMENTIA OF THE ALZHEIMER'S TYPE, WITH EARLY ONSET, UNCOMPLICATED (HCC): ICD-10-CM

## 2018-01-12 DIAGNOSIS — F22 PARANOIA (HCC): ICD-10-CM

## 2018-01-12 DIAGNOSIS — J06.9 ACUTE UPPER RESPIRATORY INFECTION: ICD-10-CM

## 2018-01-12 DIAGNOSIS — R45.86 EMOTIONAL LABILITY: ICD-10-CM

## 2018-01-12 DIAGNOSIS — G30.0 DEMENTIA OF THE ALZHEIMER'S TYPE, WITH EARLY ONSET, UNCOMPLICATED (HCC): ICD-10-CM

## 2018-01-12 DIAGNOSIS — R19.5 ABNORMAL STOOL CALIBER: ICD-10-CM

## 2018-01-12 DIAGNOSIS — M15.9 PRIMARY OSTEOARTHRITIS INVOLVING MULTIPLE JOINTS: ICD-10-CM

## 2018-01-12 DIAGNOSIS — J01.01 ACUTE RECURRENT MAXILLARY SINUSITIS: ICD-10-CM

## 2018-01-12 DIAGNOSIS — I10 HTN (HYPERTENSION), BENIGN: ICD-10-CM

## 2018-01-12 DIAGNOSIS — E11.69 DM TYPE 2 WITH DIABETIC DYSLIPIDEMIA (HCC): ICD-10-CM

## 2018-01-12 LAB — GLUCOSE BLD-MCNC: 114 MG/DL

## 2018-01-12 PROCEDURE — 1036F TOBACCO NON-USER: CPT | Performed by: FAMILY MEDICINE

## 2018-01-12 PROCEDURE — 3017F COLORECTAL CA SCREEN DOC REV: CPT | Performed by: FAMILY MEDICINE

## 2018-01-12 PROCEDURE — 20610 DRAIN/INJ JOINT/BURSA W/O US: CPT | Performed by: FAMILY MEDICINE

## 2018-01-12 PROCEDURE — 82962 GLUCOSE BLOOD TEST: CPT | Performed by: FAMILY MEDICINE

## 2018-01-12 PROCEDURE — 1123F ACP DISCUSS/DSCN MKR DOCD: CPT | Performed by: FAMILY MEDICINE

## 2018-01-12 PROCEDURE — 99214 OFFICE O/P EST MOD 30 MIN: CPT | Performed by: FAMILY MEDICINE

## 2018-01-12 PROCEDURE — 3014F SCREEN MAMMO DOC REV: CPT | Performed by: FAMILY MEDICINE

## 2018-01-12 PROCEDURE — G8427 DOCREV CUR MEDS BY ELIG CLIN: HCPCS | Performed by: FAMILY MEDICINE

## 2018-01-12 PROCEDURE — G8484 FLU IMMUNIZE NO ADMIN: HCPCS | Performed by: FAMILY MEDICINE

## 2018-01-12 PROCEDURE — 4040F PNEUMOC VAC/ADMIN/RCVD: CPT | Performed by: FAMILY MEDICINE

## 2018-01-12 PROCEDURE — G8399 PT W/DXA RESULTS DOCUMENT: HCPCS | Performed by: FAMILY MEDICINE

## 2018-01-12 PROCEDURE — 1090F PRES/ABSN URINE INCON ASSESS: CPT | Performed by: FAMILY MEDICINE

## 2018-01-12 PROCEDURE — G8419 CALC BMI OUT NRM PARAM NOF/U: HCPCS | Performed by: FAMILY MEDICINE

## 2018-01-12 PROCEDURE — 3046F HEMOGLOBIN A1C LEVEL >9.0%: CPT | Performed by: FAMILY MEDICINE

## 2018-01-12 RX ORDER — FLUCONAZOLE 100 MG/1
TABLET ORAL
Qty: 90 TABLET | Refills: 3 | Status: CANCELLED | OUTPATIENT
Start: 2018-01-12

## 2018-01-12 RX ORDER — BUPROPION HYDROCHLORIDE 150 MG/1
TABLET, EXTENDED RELEASE ORAL
Qty: 180 TABLET | Refills: 3 | Status: CANCELLED | OUTPATIENT
Start: 2018-01-12

## 2018-01-12 RX ORDER — BENZONATATE 100 MG/1
100-200 CAPSULE ORAL 3 TIMES DAILY PRN
Qty: 60 CAPSULE | Refills: 1 | Status: SHIPPED | OUTPATIENT
Start: 2018-01-12 | End: 2018-02-09

## 2018-01-12 RX ORDER — DICLOFENAC SODIUM 75 MG/1
TABLET, DELAYED RELEASE ORAL
Qty: 180 TABLET | Refills: 3 | Status: SHIPPED | OUTPATIENT
Start: 2018-01-12 | End: 2018-03-19

## 2018-01-12 RX ORDER — CITALOPRAM 20 MG/1
20 TABLET ORAL DAILY
Qty: 90 TABLET | Refills: 3 | Status: SHIPPED | OUTPATIENT
Start: 2018-01-12 | End: 2018-03-19 | Stop reason: SDUPTHER

## 2018-01-12 RX ORDER — LOSARTAN POTASSIUM 50 MG/1
TABLET ORAL
Qty: 90 TABLET | Refills: 3 | Status: SHIPPED | OUTPATIENT
Start: 2018-01-12 | End: 2018-10-26 | Stop reason: SDUPTHER

## 2018-01-12 RX ORDER — OCTISALATE, AVOBENZONE, HOMOSALATE, AND OCTOCRYLENE 29.4; 29.4; 49; 25.48 MG/ML; MG/ML; MG/ML; MG/ML
1 LOTION TOPICAL DAILY
Qty: 90 CAPSULE | Refills: 3 | Status: SHIPPED | OUTPATIENT
Start: 2018-01-12

## 2018-01-12 RX ORDER — GLIPIZIDE 5 MG/1
TABLET, FILM COATED, EXTENDED RELEASE ORAL
Qty: 180 TABLET | Refills: 3 | Status: CANCELLED | OUTPATIENT
Start: 2018-01-12

## 2018-01-12 RX ORDER — SIMVASTATIN 40 MG
TABLET ORAL
Qty: 90 TABLET | Refills: 3 | Status: SHIPPED | OUTPATIENT
Start: 2018-01-12 | End: 2018-03-19 | Stop reason: SDUPTHER

## 2018-01-12 RX ORDER — MEMANTINE HYDROCHLORIDE 10 MG/1
10 TABLET ORAL 2 TIMES DAILY
Qty: 180 TABLET | Refills: 3 | Status: SHIPPED | OUTPATIENT
Start: 2018-01-12 | End: 2018-03-19 | Stop reason: SDUPTHER

## 2018-01-12 RX ORDER — FLUTICASONE PROPIONATE 50 MCG
SPRAY, SUSPENSION (ML) NASAL
Qty: 3 BOTTLE | Refills: 3 | Status: SHIPPED | OUTPATIENT
Start: 2018-01-12 | End: 2019-01-27 | Stop reason: SDUPTHER

## 2018-01-12 RX ORDER — ACYCLOVIR 400 MG/1
400 TABLET ORAL DAILY
Qty: 90 TABLET | Refills: 3 | Status: SHIPPED | OUTPATIENT
Start: 2018-01-12 | End: 2018-02-09

## 2018-01-12 RX ORDER — DONEPEZIL HYDROCHLORIDE 10 MG/1
20 TABLET, FILM COATED ORAL NIGHTLY
Qty: 180 TABLET | Refills: 3 | Status: CANCELLED | OUTPATIENT
Start: 2018-01-12

## 2018-01-12 RX ORDER — CITALOPRAM 20 MG/1
20 TABLET ORAL DAILY
Qty: 90 TABLET | Refills: 3 | Status: CANCELLED | OUTPATIENT
Start: 2018-01-12

## 2018-01-12 RX ORDER — PIOGLITAZONEHYDROCHLORIDE 30 MG/1
TABLET ORAL
Qty: 90 TABLET | Refills: 3 | Status: SHIPPED | OUTPATIENT
Start: 2018-01-12 | End: 2018-10-26 | Stop reason: SDUPTHER

## 2018-01-12 RX ORDER — LISINOPRIL 10 MG/1
TABLET ORAL
Qty: 90 TABLET | Refills: 3 | Status: SHIPPED | OUTPATIENT
Start: 2018-01-12 | End: 2018-02-09

## 2018-01-12 RX ORDER — DONEPEZIL HYDROCHLORIDE 23 MG/1
23 TABLET, FILM COATED ORAL NIGHTLY
Qty: 90 TABLET | Refills: 3 | Status: SHIPPED | OUTPATIENT
Start: 2018-01-12 | End: 2018-05-22

## 2018-01-12 RX ORDER — METHYLPREDNISOLONE ACETATE 80 MG/ML
80 INJECTION, SUSPENSION INTRA-ARTICULAR; INTRALESIONAL; INTRAMUSCULAR; SOFT TISSUE ONCE
Status: COMPLETED | OUTPATIENT
Start: 2018-01-12 | End: 2018-01-12

## 2018-01-12 RX ORDER — BUPROPION HYDROCHLORIDE 150 MG/1
TABLET, EXTENDED RELEASE ORAL
Qty: 180 TABLET | Refills: 3 | Status: SHIPPED | OUTPATIENT
Start: 2018-01-12 | End: 2018-03-19 | Stop reason: SDUPTHER

## 2018-01-12 RX ORDER — GLIPIZIDE 5 MG/1
5 TABLET ORAL DAILY
Qty: 90 TABLET | Refills: 3 | Status: SHIPPED | OUTPATIENT
Start: 2018-01-12 | End: 2018-02-09

## 2018-01-12 RX ADMIN — METHYLPREDNISOLONE ACETATE 80 MG: 80 INJECTION, SUSPENSION INTRA-ARTICULAR; INTRALESIONAL; INTRAMUSCULAR; SOFT TISSUE at 15:07

## 2018-01-12 ASSESSMENT — ENCOUNTER SYMPTOMS
CONSTIPATION: 0
WHEEZING: 0
SORE THROAT: 0
VOMITING: 0
RESPIRATORY NEGATIVE: 1
SHORTNESS OF BREATH: 0
SINUS PRESSURE: 0
NAUSEA: 0
ABDOMINAL PAIN: 0
DIARRHEA: 0
BACK PAIN: 0
COUGH: 0

## 2018-01-12 NOTE — PROGRESS NOTES
file.     Social History Main Topics    Smoking status: Former Smoker     Packs/day: 1.50     Years: 20.00     Quit date: 1/1/1996    Smokeless tobacco: Never Used    Alcohol use 1.0 oz/week     2 Standard drinks or equivalent per week    Drug use: No    Sexual activity: Not Currently     Other Topics Concern    Not on file     Social History Narrative    No narrative on file     Allergies:  Augmentin [amoxicillin-pot clavulanate]; Cephalexin; Ciprofloxacin; Darvocet [propoxyphene n-acetaminophen]; Effexor [venlafaxine]; Macrobid [nitrofurantoin monohyd macro]; Percocet [oxycodone-acetaminophen]; Sulfa antibiotics; Zithromax [azithromycin]; and Vicodin [hydrocodone-acetaminophen]    Review of Systems   Constitutional: Negative for chills, fatigue and fever. HENT: Negative for congestion, ear pain, sinus pressure and sore throat. Respiratory: Negative. Negative for cough, shortness of breath and wheezing. Cardiovascular: Negative for chest pain and palpitations. Gastrointestinal: Negative for abdominal pain, constipation, diarrhea, nausea and vomiting. Musculoskeletal: Positive for arthralgias, gait problem and joint swelling. Negative for back pain and neck pain. Neurological: Negative for dizziness and headaches. Objective:   /62 (Site: Right Arm, Position: Sitting, Cuff Size: Medium Adult)   Pulse 64   Temp 97.8 °F (36.6 °C) (Tympanic)   Resp 14   Ht 5' 5.5\" (1.664 m)   Wt 154 lb 9.6 oz (70.1 kg)   BMI 25.34 kg/m²     Physical Exam   Constitutional: She is oriented to person, place, and time. She appears well-developed and well-nourished. HENT:   Head: Normocephalic and atraumatic. Eyes: Conjunctivae and EOM are normal. Pupils are equal, round, and reactive to light. Neck: Normal range of motion. Neck supple. No thyromegaly present. Cardiovascular: Normal rate, regular rhythm and normal heart sounds. No murmur heard.   Pulmonary/Chest: Effort normal and breath sounds normal. She has no wheezes. She exhibits no tenderness. Abdominal: Soft. Bowel sounds are normal. There is no tenderness. Musculoskeletal: She exhibits tenderness. She exhibits no edema. Right knee: She exhibits decreased range of motion and bony tenderness. She exhibits no effusion. Tenderness found. Medial joint line and lateral joint line tenderness noted. Lymphadenopathy:     She has no cervical adenopathy. Neurological: She is alert and oriented to person, place, and time. She has normal reflexes. Coordination normal.   Skin: Skin is warm and dry. No rash noted. Psychiatric: She has a normal mood and affect. Thought content normal.   Nursing note and vitals reviewed. Assessment:     1. Reactive depression  buPROPion (WELLBUTRIN SR) 150 MG extended release tablet   2. Emotional lability     3. Primary osteoarthritis involving multiple joints  diclofenac (VOLTAREN) 75 MG EC tablet   4. Late onset Alzheimer's disease with behavioral disturbance     5. Onychomycosis     6. Acute recurrent maxillary sinusitis  fluticasone (FLONASE) 50 MCG/ACT nasal spray   7. DM type 2 with diabetic dyslipidemia (HCC)  pioglitazone (ACTOS) 30 MG tablet    POCT Glucose   8. HTN (hypertension), benign  lisinopril (PRINIVIL;ZESTRIL) 10 MG tablet   9. Dementia of the Alzheimer's type, with early onset, with depressive mood  memantine (NAMENDA) 10 MG tablet   10. Mixed hyperlipidemia  simvastatin (ZOCOR) 40 MG tablet   11. Acute upper respiratory infection  benzonatate (TESSALON PERLES) 100 MG capsule   12.  Acute pain of right knee  XR KNEE RIGHT (1-2 VIEWS)    methylPREDNISolone acetate (DEPO-MEDROL) injection 80 mg       Plan:      Orders Placed This Encounter   Procedures    XR KNEE RIGHT (1-2 VIEWS)     Standing Status:   Future     Number of Occurrences:   1     Standing Expiration Date:   1/12/2019    POCT Glucose     Orders Placed This Encounter   Medications    diclofenac (VOLTAREN) 75 MG EC tablet not responding to other therapy    Anesthesia: Lidocaine 1% without epinephrine     Procedure Details     Verbal consent was obtained for the procedure. The joint was prepped with Betadine and a small wheel of anesthetic was injected into the subcutaneous tissue. A 22 gauge needle was inserted into the anterior aspect of the joint inserting Depomedrol 80mgs. The needle was removed and the area cleansed and dressed. Complications:  None; patient tolerated the procedure well. Post - procedure instructions: If patient develops redness, swelling, pain, tenderness, fever, chills, bleeding, bruising, or drainage she will let us know immediately. Activity as tolerated. She may shower and bathe at home. Return in about 4 weeks (around 2/9/2018) for Review of Labs & Diagnostic Testing, Routine follow up. I, Sara Tyson ([address] [place] [address])  , am scribing for and in the presence of Cesar Jarvis DO. Electronically signed by :  Sara Tyson ([address] [place] [address])      Marcus Salgado DO, personally performed the services described in this documentation, as scribed by Jayna Guerrier in my presence, and it is both accurate and complete.  Electronically signed by: Cesar Jarvis DO    1/13/18 6:11 PM    Cesar Jarvis DO

## 2018-01-14 NOTE — CARE COORDINATION
tablet TAKE ONE TABLET BY MOUTH EVERY DAY 1/12/18   [facility], DO   simvastatin (ZOCOR) 40 MG tablet Take 1 tablet by mouth nightly 1/12/18   [facility], DO   benzonatate (TESSALON PERLES) 100 MG capsule Take 1-2 capsules by mouth 3 times daily as needed for Cough 1/12/18   [facility], DO   glipiZIDE (GLUCOTROL) 5 MG tablet Take 1 tablet by mouth daily 1/12/18   [facility], DO   Donepezil HCl (ARICEPT) 23 MG TABS tablet Take 1 tablet by mouth nightly 1/12/18   [facility], DO   Probiotic Product (ALIGN) 4 MG CAPS Take 1 capsule by mouth daily 1/12/18   [facility], DO   buPROPion [facility]) 150 MG extended release tablet Take 1 tablet by mouth twice a day 1/12/18   [facility], DO   acyclovir (ZOVIRAX) 400 MG tablet Take 1 tablet by mouth daily 1/12/18   [facility], DO   citalopram (CELEXA) 20 MG tablet Take 1 tablet by mouth daily 1/12/18   [facility], DO   losartan (COZAAR) 50 MG tablet One daily 1/12/18   [facility], DO   glipiZIDE (GLIPIZIDE XL) 5 MG extended release tablet TAKE ONE TABLET  bid 12/1/17   [facility], DO   fluconazole (DIFLUCAN) 100 MG tablet TAKE ONE TABLET BY MOUTH EVERY DAY 12/1/17   [facility], DO   acyclovir (ZOVIRAX) 400 MG tablet TAKE ONE TABLET BY MOUTH DAILY 12/1/17   [facility], DO   donepezil (ARICEPT) 10 MG tablet Take 2 tablets by mouth nightly 12/1/17   [facility], DO   citalopram (CELEXA) 20 MG tablet Take 1 tablet by mouth daily 12/1/17   [facility], DO   dextromethorphan-quiNIDine (NUEDEXTA) 20-10 MG CAPS per capsule Take 1 capsule by mouth daily 11/2/17   [facility], DO   Tuberculin-Allergy Syringes 28G X 1/2\" 1 ML MISC 1 each by Does not apply route daily as needed (as prescribed) 8/24/17   [facility] Renetta, DO   Calcium Citrate-Vitamin D (CALCIUM + D PO) Take by mouth    Historical Provider, MD   Fexofenadine HCl (ALLEGRA PO) Take by mouth    Historical Provider, MD   celecoxib (CELEBREX) 100 MG capsule Take 1 capsule by mouth 2 times daily 5/12/17   Jelani Quezada,    omeprazole (PRILOSEC) 20 MG delayed release capsule Take 1 capsule by mouth  daily  Patient taking differently: as needed Take 1 capsule by mouth  daily 3/10/17   Jelani Quezada DO   Multiple Vitamin (MULTIVITAMIN PO) Take  by mouth daily.       Historical Provider, MD       Future Appointments  Date Time Provider Rohan Waggoner   2/9/2018 1:45 PM Jamie Reaves

## 2018-01-17 ENCOUNTER — CARE COORDINATION (OUTPATIENT)
Dept: CARE COORDINATION | Age: 68
End: 2018-01-17

## 2018-01-19 ENCOUNTER — TELEPHONE (OUTPATIENT)
Dept: PRIMARY CARE CLINIC | Age: 68
End: 2018-01-19

## 2018-01-19 NOTE — CARE COORDINATION
Received call from  Ryder Wren stating she cannot obtain her glipizide from [facility] due to a problem. [facility] and clarified with pcp that glipizide is to be XL. Pharmacist states they will dispense medication.  Return call to [facility] and left message and requested call back if problem continues

## 2018-01-30 ENCOUNTER — TELEPHONE (OUTPATIENT)
Dept: PRIMARY CARE CLINIC | Age: 68
End: 2018-01-30

## 2018-02-09 ENCOUNTER — OFFICE VISIT (OUTPATIENT)
Dept: PRIMARY CARE CLINIC | Age: 68
End: 2018-02-09
Payer: MEDICARE

## 2018-02-09 VITALS
TEMPERATURE: 98.2 F | RESPIRATION RATE: 16 BRPM | WEIGHT: 154 LBS | HEIGHT: 66 IN | BODY MASS INDEX: 24.75 KG/M2 | DIASTOLIC BLOOD PRESSURE: 60 MMHG | SYSTOLIC BLOOD PRESSURE: 120 MMHG | HEART RATE: 93 BPM | OXYGEN SATURATION: 94 %

## 2018-02-09 DIAGNOSIS — J06.9 ACUTE UPPER RESPIRATORY INFECTION: ICD-10-CM

## 2018-02-09 DIAGNOSIS — K21.9 GASTROESOPHAGEAL REFLUX DISEASE WITHOUT ESOPHAGITIS: ICD-10-CM

## 2018-02-09 DIAGNOSIS — E78.5 DM TYPE 2 WITH DIABETIC DYSLIPIDEMIA (HCC): Primary | ICD-10-CM

## 2018-02-09 DIAGNOSIS — M17.11 PRIMARY OSTEOARTHRITIS OF RIGHT KNEE: ICD-10-CM

## 2018-02-09 DIAGNOSIS — E11.69 DM TYPE 2 WITH DIABETIC DYSLIPIDEMIA (HCC): Primary | ICD-10-CM

## 2018-02-09 LAB
GLUCOSE BLD-MCNC: 206 MG/DL
HBA1C MFR BLD: 6.2 %

## 2018-02-09 PROCEDURE — 1123F ACP DISCUSS/DSCN MKR DOCD: CPT | Performed by: FAMILY MEDICINE

## 2018-02-09 PROCEDURE — 3014F SCREEN MAMMO DOC REV: CPT | Performed by: FAMILY MEDICINE

## 2018-02-09 PROCEDURE — 4040F PNEUMOC VAC/ADMIN/RCVD: CPT | Performed by: FAMILY MEDICINE

## 2018-02-09 PROCEDURE — 83036 HEMOGLOBIN GLYCOSYLATED A1C: CPT | Performed by: FAMILY MEDICINE

## 2018-02-09 PROCEDURE — G8484 FLU IMMUNIZE NO ADMIN: HCPCS | Performed by: FAMILY MEDICINE

## 2018-02-09 PROCEDURE — 3017F COLORECTAL CA SCREEN DOC REV: CPT | Performed by: FAMILY MEDICINE

## 2018-02-09 PROCEDURE — G8427 DOCREV CUR MEDS BY ELIG CLIN: HCPCS | Performed by: FAMILY MEDICINE

## 2018-02-09 PROCEDURE — 82962 GLUCOSE BLOOD TEST: CPT | Performed by: FAMILY MEDICINE

## 2018-02-09 PROCEDURE — 1036F TOBACCO NON-USER: CPT | Performed by: FAMILY MEDICINE

## 2018-02-09 PROCEDURE — G8399 PT W/DXA RESULTS DOCUMENT: HCPCS | Performed by: FAMILY MEDICINE

## 2018-02-09 PROCEDURE — 99214 OFFICE O/P EST MOD 30 MIN: CPT | Performed by: FAMILY MEDICINE

## 2018-02-09 PROCEDURE — 1090F PRES/ABSN URINE INCON ASSESS: CPT | Performed by: FAMILY MEDICINE

## 2018-02-09 PROCEDURE — G8419 CALC BMI OUT NRM PARAM NOF/U: HCPCS | Performed by: FAMILY MEDICINE

## 2018-02-09 PROCEDURE — 3044F HG A1C LEVEL LT 7.0%: CPT | Performed by: FAMILY MEDICINE

## 2018-02-09 RX ORDER — HYDROXYZINE HYDROCHLORIDE 10 MG/1
TABLET, FILM COATED ORAL
Qty: 90 TABLET | Refills: 1 | Status: SHIPPED | OUTPATIENT
Start: 2018-02-09

## 2018-02-09 RX ORDER — AMOXICILLIN 500 MG/1
TABLET, FILM COATED ORAL
Qty: 30 TABLET | Refills: 0 | Status: SHIPPED | OUTPATIENT
Start: 2018-02-09 | End: 2018-03-19

## 2018-02-09 RX ORDER — RANITIDINE 150 MG/1
150 TABLET ORAL 2 TIMES DAILY
Qty: 180 TABLET | Refills: 1 | Status: SHIPPED | OUTPATIENT
Start: 2018-02-09 | End: 2019-01-16 | Stop reason: SDUPTHER

## 2018-02-09 ASSESSMENT — ENCOUNTER SYMPTOMS
ABDOMINAL PAIN: 0
BELCHING: 0
DIARRHEA: 0
SORE THROAT: 0
WHEEZING: 0
COUGH: 0
SINUS PRESSURE: 0
RESPIRATORY NEGATIVE: 1
BACK PAIN: 0
STRIDOR: 0
HEARTBURN: 1
SHORTNESS OF BREATH: 0
CONSTIPATION: 0

## 2018-02-09 NOTE — PROGRESS NOTES
mention of complication, not stated as uncontrolled      Past Surgical History:   Procedure Laterality Date    CERVIX SURGERY      CONGATION OF CERVIX    EYE SURGERY Bilateral     lasik    FINGER TRIGGER RELEASE  10/18/2016    HYSTERECTOMY      NASAL SEPTUM SURGERY  11/1999    IN COLON CA SCRN NOT  [address] IND N/A 7/21/2017    COLONOSCOPY performed by Hosea Siddiqui MD at [place] [id number] ENDOSCOPY  08/23/2012     Family History   Problem Relation Age of Onset    Diabetes Mother     High Blood Pressure Sister      Social History     Social History    Marital status:      Spouse name: N/A    Number of children: N/A    Years of education: N/A     Occupational History    Not on file. Social History Main Topics    Smoking status: Former Smoker     Packs/day: 1.50     Years: 20.00     Quit date: 1/1/1996    Smokeless tobacco: Never Used    Alcohol use 1.0 oz/week     2 Standard drinks or equivalent per week    Drug use: No    Sexual activity: Not Currently     Other Topics Concern    Not on file     Social History Narrative    No narrative on file     Allergies:  Augmentin [amoxicillin-pot clavulanate]; Cephalexin; Ciprofloxacin; Darvocet [propoxyphene n-acetaminophen]; Effexor [venlafaxine]; Macrobid [nitrofurantoin monohyd macro]; Percocet [oxycodone-acetaminophen]; Sulfa antibiotics; Zithromax [azithromycin]; and Vicodin [hydrocodone-acetaminophen]    Review of Systems   HENT: Negative for ear pain, sinus pressure and sore throat. Respiratory: Negative. Negative for cough, shortness of breath and wheezing. Cardiovascular: Negative for palpitations. Gastrointestinal: Positive for heartburn. Negative for abdominal pain, constipation and diarrhea. Musculoskeletal: Positive for arthralgias, gait problem and joint swelling. Negative for back pain. Right knee pain    Skin: Negative for rash.    Neurological: Negative for dizziness and tingling. Objective:   /60 (Site: Right Arm, Position: Sitting, Cuff Size: Medium Adult)   Pulse 93   Temp 98.2 °F (36.8 °C) (Tympanic)   Resp 16   Ht 5' 5.5\" (1.664 m)   Wt 154 lb (69.9 kg)   SpO2 94%   Breastfeeding? No   BMI 25.24 kg/m²     Physical Exam   Constitutional: She is oriented to person, place, and time. She appears well-developed and well-nourished. HENT:   Head: Normocephalic and atraumatic. Eyes: Conjunctivae and EOM are normal. Pupils are equal, round, and reactive to light. Neck: Normal range of motion. Neck supple. No thyromegaly present. Cardiovascular: Normal rate, regular rhythm and normal heart sounds. No murmur heard. Pulmonary/Chest: Effort normal and breath sounds normal. She has no wheezes. She exhibits no tenderness. Abdominal: Soft. Bowel sounds are normal. There is no tenderness. Musculoskeletal: Normal range of motion. She exhibits tenderness. She exhibits no edema. Right knee: She exhibits bony tenderness. Tenderness found. Medial joint line and lateral joint line tenderness noted. Lymphadenopathy:     She has no cervical adenopathy. Neurological: She is alert and oriented to person, place, and time. She has normal reflexes. Coordination normal.   Skin: Skin is warm and dry. No rash noted. Psychiatric: She has a normal mood and affect. Thought content normal.   Nursing note and vitals reviewed. Assessment:     1. DM type 2 with diabetic dyslipidemia (HCC)  POCT glycosylated hemoglobin (Hb A1C)    POCT Glucose   2. Acute upper respiratory infection  hydrOXYzine (ATARAX) 10 MG tablet    Amoxicillin 500 MG TABS   3. Primary osteoarthritis of right knee  External Referral - Ruth Lacey MD - Arthroscopy, Sports Med   4.  Gastroesophageal reflux disease without esophagitis  ranitidine (ZANTAC) 150 MG tablet       Plan:      Orders Placed This Encounter   Procedures    External Referral - Ruth Lacey MD

## 2018-02-12 ENCOUNTER — TELEPHONE (OUTPATIENT)
Dept: PRIMARY CARE CLINIC | Age: 68
End: 2018-02-12

## 2018-02-16 ENCOUNTER — CARE COORDINATION (OUTPATIENT)
Dept: CARE COORDINATION | Age: 68
End: 2018-02-16

## 2018-02-20 ENCOUNTER — OFFICE VISIT (OUTPATIENT)
Dept: BEHAVIORAL/MENTAL HEALTH CLINIC | Age: 68
End: 2018-02-20
Payer: MEDICARE

## 2018-02-20 DIAGNOSIS — F34.1 DYSTHYMIA: Primary | ICD-10-CM

## 2018-02-20 PROCEDURE — 90832 PSYTX W PT 30 MINUTES: CPT | Performed by: PSYCHOLOGIST

## 2018-02-20 ASSESSMENT — PATIENT HEALTH QUESTIONNAIRE - PHQ9
3. TROUBLE FALLING OR STAYING ASLEEP: 1
1. LITTLE INTEREST OR PLEASURE IN DOING THINGS: 1
SUM OF ALL RESPONSES TO PHQ QUESTIONS 1-9: 6
9. THOUGHTS THAT YOU WOULD BE BETTER OFF DEAD, OR OF HURTING YOURSELF: 0
6. FEELING BAD ABOUT YOURSELF - OR THAT YOU ARE A FAILURE OR HAVE LET YOURSELF OR YOUR FAMILY DOWN: 0
8. MOVING OR SPEAKING SO SLOWLY THAT OTHER PEOPLE COULD HAVE NOTICED. OR THE OPPOSITE, BEING SO FIGETY OR RESTLESS THAT YOU HAVE BEEN MOVING AROUND A LOT MORE THAN USUAL: 1
4. FEELING TIRED OR HAVING LITTLE ENERGY: 1
SUM OF ALL RESPONSES TO PHQ9 QUESTIONS 1 & 2: 2
10. IF YOU CHECKED OFF ANY PROBLEMS, HOW DIFFICULT HAVE THESE PROBLEMS MADE IT FOR YOU TO DO YOUR WORK, TAKE CARE OF THINGS AT HOME, OR GET ALONG WITH OTHER PEOPLE: 1
7. TROUBLE CONCENTRATING ON THINGS, SUCH AS READING THE NEWSPAPER OR WATCHING TELEVISION: 0
5. POOR APPETITE OR OVEREATING: 1
2. FEELING DOWN, DEPRESSED OR HOPELESS: 1

## 2018-03-19 ENCOUNTER — OFFICE VISIT (OUTPATIENT)
Dept: PRIMARY CARE CLINIC | Age: 68
End: 2018-03-19
Payer: MEDICARE

## 2018-03-19 ENCOUNTER — CARE COORDINATOR VISIT (OUTPATIENT)
Dept: CARE COORDINATION | Age: 68
End: 2018-03-19

## 2018-03-19 VITALS
SYSTOLIC BLOOD PRESSURE: 118 MMHG | OXYGEN SATURATION: 98 % | RESPIRATION RATE: 14 BRPM | BODY MASS INDEX: 24.91 KG/M2 | HEART RATE: 82 BPM | TEMPERATURE: 97.9 F | DIASTOLIC BLOOD PRESSURE: 60 MMHG | WEIGHT: 155 LBS | HEIGHT: 66 IN

## 2018-03-19 DIAGNOSIS — B00.9 HSV-1 INFECTION: ICD-10-CM

## 2018-03-19 DIAGNOSIS — G30.1 LATE ONSET ALZHEIMER'S DISEASE WITH BEHAVIORAL DISTURBANCE (HCC): ICD-10-CM

## 2018-03-19 DIAGNOSIS — M15.9 PRIMARY OSTEOARTHRITIS INVOLVING MULTIPLE JOINTS: ICD-10-CM

## 2018-03-19 DIAGNOSIS — G30.0 DEMENTIA OF THE ALZHEIMER'S TYPE, WITH EARLY ONSET, WITH DEPRESSIVE MOOD (HCC): ICD-10-CM

## 2018-03-19 DIAGNOSIS — E11.69 DM TYPE 2 WITH DIABETIC DYSLIPIDEMIA (HCC): ICD-10-CM

## 2018-03-19 DIAGNOSIS — F32.9 REACTIVE DEPRESSION: ICD-10-CM

## 2018-03-19 DIAGNOSIS — F02.818 LATE ONSET ALZHEIMER'S DISEASE WITH BEHAVIORAL DISTURBANCE (HCC): ICD-10-CM

## 2018-03-19 DIAGNOSIS — I10 HTN (HYPERTENSION), BENIGN: Primary | ICD-10-CM

## 2018-03-19 DIAGNOSIS — F02.83 DEMENTIA OF THE ALZHEIMER'S TYPE, WITH EARLY ONSET, WITH DEPRESSIVE MOOD (HCC): ICD-10-CM

## 2018-03-19 DIAGNOSIS — Z13.31 POSITIVE DEPRESSION SCREENING: ICD-10-CM

## 2018-03-19 DIAGNOSIS — E78.5 DM TYPE 2 WITH DIABETIC DYSLIPIDEMIA (HCC): ICD-10-CM

## 2018-03-19 DIAGNOSIS — E78.2 MIXED HYPERLIPIDEMIA: ICD-10-CM

## 2018-03-19 LAB — GLUCOSE BLD-MCNC: 166 MG/DL

## 2018-03-19 PROCEDURE — G8419 CALC BMI OUT NRM PARAM NOF/U: HCPCS | Performed by: FAMILY MEDICINE

## 2018-03-19 PROCEDURE — 4040F PNEUMOC VAC/ADMIN/RCVD: CPT | Performed by: FAMILY MEDICINE

## 2018-03-19 PROCEDURE — G8399 PT W/DXA RESULTS DOCUMENT: HCPCS | Performed by: FAMILY MEDICINE

## 2018-03-19 PROCEDURE — G8482 FLU IMMUNIZE ORDER/ADMIN: HCPCS | Performed by: FAMILY MEDICINE

## 2018-03-19 PROCEDURE — 1123F ACP DISCUSS/DSCN MKR DOCD: CPT | Performed by: FAMILY MEDICINE

## 2018-03-19 PROCEDURE — 1090F PRES/ABSN URINE INCON ASSESS: CPT | Performed by: FAMILY MEDICINE

## 2018-03-19 PROCEDURE — 3017F COLORECTAL CA SCREEN DOC REV: CPT | Performed by: FAMILY MEDICINE

## 2018-03-19 PROCEDURE — 1036F TOBACCO NON-USER: CPT | Performed by: FAMILY MEDICINE

## 2018-03-19 PROCEDURE — 3014F SCREEN MAMMO DOC REV: CPT | Performed by: FAMILY MEDICINE

## 2018-03-19 PROCEDURE — 3044F HG A1C LEVEL LT 7.0%: CPT | Performed by: FAMILY MEDICINE

## 2018-03-19 PROCEDURE — 82962 GLUCOSE BLOOD TEST: CPT | Performed by: FAMILY MEDICINE

## 2018-03-19 PROCEDURE — G8427 DOCREV CUR MEDS BY ELIG CLIN: HCPCS | Performed by: FAMILY MEDICINE

## 2018-03-19 PROCEDURE — 99214 OFFICE O/P EST MOD 30 MIN: CPT | Performed by: FAMILY MEDICINE

## 2018-03-19 PROCEDURE — G8431 POS CLIN DEPRES SCRN F/U DOC: HCPCS | Performed by: FAMILY MEDICINE

## 2018-03-19 RX ORDER — DICLOFENAC SODIUM 75 MG/1
TABLET, DELAYED RELEASE ORAL
Qty: 180 TABLET | Refills: 3 | Status: SHIPPED | OUTPATIENT
Start: 2018-03-19 | End: 2018-10-26 | Stop reason: SDUPTHER

## 2018-03-19 RX ORDER — BUPROPION HYDROCHLORIDE 150 MG/1
TABLET, EXTENDED RELEASE ORAL
Qty: 180 TABLET | Refills: 3 | Status: SHIPPED | OUTPATIENT
Start: 2018-03-19 | End: 2018-10-26 | Stop reason: SDUPTHER

## 2018-03-19 RX ORDER — MEMANTINE HYDROCHLORIDE 10 MG/1
10 TABLET ORAL 2 TIMES DAILY
Qty: 180 TABLET | Refills: 3 | Status: SHIPPED | OUTPATIENT
Start: 2018-03-19 | End: 2018-10-26 | Stop reason: SDUPTHER

## 2018-03-19 RX ORDER — AMOXICILLIN AND CLAVULANATE POTASSIUM 875; 125 MG/1; MG/1
1 TABLET, FILM COATED ORAL 2 TIMES DAILY
Qty: 20 TABLET | Refills: 0 | Status: SHIPPED | OUTPATIENT
Start: 2018-03-19 | End: 2018-04-10 | Stop reason: SDUPTHER

## 2018-03-19 RX ORDER — SIMVASTATIN 40 MG
TABLET ORAL
Qty: 90 TABLET | Refills: 3 | Status: SHIPPED | OUTPATIENT
Start: 2018-03-19 | End: 2018-10-26 | Stop reason: SDUPTHER

## 2018-03-19 RX ORDER — DICLOFENAC SODIUM 75 MG/1
75 TABLET, DELAYED RELEASE ORAL 2 TIMES DAILY
COMMUNITY
End: 2018-04-10

## 2018-03-19 RX ORDER — DONEPEZIL HYDROCHLORIDE 23 MG/1
23 TABLET, FILM COATED ORAL NIGHTLY
Qty: 90 TABLET | Refills: 1 | Status: SHIPPED | OUTPATIENT
Start: 2018-03-19 | End: 2018-05-22

## 2018-03-19 RX ORDER — DONEPEZIL HYDROCHLORIDE 23 MG/1
23 TABLET, FILM COATED ORAL NIGHTLY
Qty: 90 TABLET | Refills: 3 | Status: CANCELLED | OUTPATIENT
Start: 2018-03-19 | End: 2018-06-17

## 2018-03-19 RX ORDER — CITALOPRAM 20 MG/1
20 TABLET ORAL DAILY
Qty: 90 TABLET | Refills: 3 | Status: SHIPPED | OUTPATIENT
Start: 2018-03-19 | End: 2018-10-26 | Stop reason: SDUPTHER

## 2018-03-19 RX ORDER — ACYCLOVIR 400 MG/1
TABLET ORAL
Qty: 90 TABLET | Refills: 3 | Status: SHIPPED | OUTPATIENT
Start: 2018-03-19 | End: 2018-10-26 | Stop reason: SDUPTHER

## 2018-03-19 ASSESSMENT — ENCOUNTER SYMPTOMS
VISUAL CHANGE: 0
CONSTIPATION: 0
APNEA: 0
BLURRED VISION: 0
CHEST TIGHTNESS: 0
BELCHING: 0
WATER BRASH: 0
COLOR CHANGE: 0
GLOBUS SENSATION: 0
SORE THROAT: 0
COUGH: 0
EYE REDNESS: 0
HEARTBURN: 1
EYE PAIN: 0
NAUSEA: 0
STRIDOR: 0
ABDOMINAL PAIN: 0
EYE DISCHARGE: 0
WHEEZING: 0
PHOTOPHOBIA: 0
FACIAL SWELLING: 0
DIARRHEA: 0
CHOKING: 0
HOARSE VOICE: 0

## 2018-03-19 NOTE — PROGRESS NOTES
paresthesias, no foot ulcerations, no polydipsia, no polyphagia, no polyuria, no visual change, no weakness and no weight loss. There are no hypoglycemic complications. Symptoms are stable. There are no diabetic complications. Risk factors for coronary artery disease include diabetes mellitus, dyslipidemia and hypertension. She is compliant with treatment most of the time. Her weight is fluctuating minimally. She is following a generally healthy diet. She participates in exercise weekly. Home blood sugar record trend: does not check. Past Medical History:   Diagnosis Date    Allergic rhinitis     Chronic sinusitis     Depression     Hx of blood clots     leg    Hyperlipidemia     Hypertension     Menopause     Thyroid disease     Tobacco abuse     Type II or unspecified type diabetes mellitus without mention of complication, not stated as uncontrolled      Past Surgical History:   Procedure Laterality Date    CERVIX SURGERY      CONGATION OF CERVIX    EYE SURGERY Bilateral     lasik    FINGER TRIGGER RELEASE  10/18/2016    HYSTERECTOMY      NASAL SEPTUM SURGERY  11/1999    RI COLON CA SCRN NOT  [address] IND N/A 7/21/2017    COLONOSCOPY performed by Bishop Murray MD at [place] [id number] ENDOSCOPY  08/23/2012     Family History   Problem Relation Age of Onset    Diabetes Mother     High Blood Pressure Sister      Social History     Social History    Marital status:      Spouse name: N/A    Number of children: N/A    Years of education: N/A     Occupational History    Not on file.      Social History Main Topics    Smoking status: Former Smoker     Packs/day: 1.50     Years: 20.00     Quit date: 1/1/1996    Smokeless tobacco: Never Used    Alcohol use 1.0 oz/week     2 Standard drinks or equivalent per week    Drug use: No    Sexual activity: Not Currently     Other Topics Concern    Not on file     Social to person, place, and time. She appears well-developed and well-nourished. HENT:   Head: Normocephalic and atraumatic. Eyes: Conjunctivae and EOM are normal. Pupils are equal, round, and reactive to light. Neck: Normal range of motion. Neck supple. No thyromegaly present. Cardiovascular: Normal rate, regular rhythm and normal heart sounds. No murmur heard. Pulmonary/Chest: Effort normal and breath sounds normal. She has no wheezes. She exhibits no tenderness. Abdominal: Soft. Bowel sounds are normal. There is no tenderness. Musculoskeletal: Normal range of motion. She exhibits no edema or tenderness. Lymphadenopathy:     She has no cervical adenopathy. Neurological: She is alert and oriented to person, place, and time. She has normal reflexes. Coordination normal.   Skin: Skin is warm and dry. No rash noted. Psychiatric: Thought content normal. Her affect is blunt. Her speech is delayed. She is slowed. Cognition and memory are impaired. She exhibits a depressed mood. She exhibits abnormal recent memory and abnormal remote memory. Nursing note and vitals reviewed. Assessment:     1. HTN (hypertension), benign     2. DM type 2 with diabetic dyslipidemia (HCC)  POCT Glucose   3. Dementia of the Alzheimer's type, with early onset, with depressive mood  memantine (NAMENDA) 10 MG tablet   4. Mixed hyperlipidemia  simvastatin (ZOCOR) 40 MG tablet   5. Reactive depression  buPROPion (WELLBUTRIN SR) 150 MG extended release tablet    citalopram (CELEXA) 20 MG tablet   6. HSV-1 infection  acyclovir (ZOVIRAX) 400 MG tablet   7. Late onset Alzheimer's disease with behavioral disturbance     8. Primary osteoarthritis involving multiple joints  diclofenac (VOLTAREN) 75 MG EC tablet   9.  Positive depression screening  Positive Screen for Clinical Depression with a Documented Follow-up Plan        Plan:      Orders Placed This Encounter   Procedures    POCT Glucose    Positive Screen for Clinical

## 2018-03-20 ENCOUNTER — OFFICE VISIT (OUTPATIENT)
Dept: BEHAVIORAL/MENTAL HEALTH CLINIC | Age: 68
End: 2018-03-20
Payer: MEDICARE

## 2018-03-20 DIAGNOSIS — F41.9 ANXIETY: Primary | ICD-10-CM

## 2018-03-20 DIAGNOSIS — F34.1 DYSTHYMIA: ICD-10-CM

## 2018-03-20 PROCEDURE — 90832 PSYTX W PT 30 MINUTES: CPT | Performed by: PSYCHOLOGIST

## 2018-03-20 ASSESSMENT — PATIENT HEALTH QUESTIONNAIRE - PHQ9
1. LITTLE INTEREST OR PLEASURE IN DOING THINGS: 1
6. FEELING BAD ABOUT YOURSELF - OR THAT YOU ARE A FAILURE OR HAVE LET YOURSELF OR YOUR FAMILY DOWN: 0
7. TROUBLE CONCENTRATING ON THINGS, SUCH AS READING THE NEWSPAPER OR WATCHING TELEVISION: 0
9. THOUGHTS THAT YOU WOULD BE BETTER OFF DEAD, OR OF HURTING YOURSELF: 0
2. FEELING DOWN, DEPRESSED OR HOPELESS: 1
5. POOR APPETITE OR OVEREATING: 1
SUM OF ALL RESPONSES TO PHQ9 QUESTIONS 1 & 2: 2
SUM OF ALL RESPONSES TO PHQ QUESTIONS 1-9: 6
3. TROUBLE FALLING OR STAYING ASLEEP: 1
8. MOVING OR SPEAKING SO SLOWLY THAT OTHER PEOPLE COULD HAVE NOTICED. OR THE OPPOSITE, BEING SO FIGETY OR RESTLESS THAT YOU HAVE BEEN MOVING AROUND A LOT MORE THAN USUAL: 0
10. IF YOU CHECKED OFF ANY PROBLEMS, HOW DIFFICULT HAVE THESE PROBLEMS MADE IT FOR YOU TO DO YOUR WORK, TAKE CARE OF THINGS AT HOME, OR GET ALONG WITH OTHER PEOPLE: 1
4. FEELING TIRED OR HAVING LITTLE ENERGY: 2

## 2018-03-20 NOTE — CARE COORDINATION
6/10  Anticipated Goal Completion Date: 3/30/2018            Prior to Admission medications    Medication Sig Start Date End Date Taking?  Authorizing Provider   memantine (NAMENDA) 10 MG tablet Take 1 tablet by mouth 2 times daily 3/19/18   Diego Chan,    simvastatin (ZOCOR) 40 MG tablet Take 1 tablet by mouth nightly 3/19/18   Diego Chan,    buPROPion [facility] SR) 150 MG extended release tablet Take 1 tablet by mouth twice a day 3/19/18   Diego Chan,    acyclovir (ZOVIRAX) 400 MG tablet TAKE ONE TABLET BY MOUTH DAILY 3/19/18   Diego Chan,    citalopram (CELEXA) 20 MG tablet Take 1 tablet by mouth daily 3/19/18   Diego Chan,    diclofenac (VOLTAREN) 75 MG EC tablet Take 1 tablet by mouth twice a day with meals 3/19/18   Diego Chan,    diclofenac (VOLTAREN) 75 MG EC tablet Take 75 mg by mouth 2 times daily    Historical Provider, MD   Donepezil HCl (ARICEPT) 23 MG TABS tablet Take 1 tablet by mouth nightly 3/19/18   Diego Chan,    amoxicillin-clavulanate (AUGMENTIN) 875-125 MG per tablet Take 1 tablet by mouth 2 times daily for 10 days 3/19/18 3/29/18  Diego Chan,    hydrOXYzine (ATARAX) 10 MG tablet TAKE ONE TABLET BY MOUTH DAILY AS NEEDED FOR ITCHING 2/9/18   Diego Chan,    ranitidine (ZANTAC) 150 MG tablet Take 1 tablet by mouth 2 times daily 2/9/18   Diego Chan,    fluticasone [facility]) 50 MCG/ACT nasal spray Use 2 sprays nasally daily 1/12/18   Diego Chan,    pioglitazone (ACTOS) 30 MG tablet TAKE ONE TABLET BY MOUTH EVERY DAY 1/12/18   Diego Chan,    Donepezil HCl (ARICEPT) 23 MG TABS tablet Take 1 tablet by mouth nightly 1/12/18   Diego Chan,    Probiotic Product (ALIGN) 4 MG CAPS Take 1 capsule by mouth daily 1/12/18   Diego Chan DO   losartan (COZAAR) 50 MG tablet One daily 1/12/18   Diego Chan,    glipiZIDE (GLIPIZIDE XL) 5 MG extended release tablet TAKE ONE TABLET  bid  Patient taking differently:

## 2018-03-22 ENCOUNTER — CARE COORDINATION (OUTPATIENT)
Dept: CARE COORDINATION | Age: 68
End: 2018-03-22

## 2018-04-06 ENCOUNTER — TELEPHONE (OUTPATIENT)
Dept: PRIMARY CARE CLINIC | Age: 68
End: 2018-04-06

## 2018-04-10 ENCOUNTER — CARE COORDINATION (OUTPATIENT)
Dept: CARE COORDINATION | Age: 68
End: 2018-04-10

## 2018-04-10 RX ORDER — AMOXICILLIN AND CLAVULANATE POTASSIUM 875; 125 MG/1; MG/1
1 TABLET, FILM COATED ORAL 2 TIMES DAILY
Qty: 20 TABLET | Refills: 0 | Status: SHIPPED | OUTPATIENT
Start: 2018-04-10 | End: 2018-04-20

## 2018-04-19 ENCOUNTER — OFFICE VISIT (OUTPATIENT)
Dept: BEHAVIORAL/MENTAL HEALTH CLINIC | Age: 68
End: 2018-04-19
Payer: MEDICARE

## 2018-04-19 DIAGNOSIS — F41.9 ANXIETY: ICD-10-CM

## 2018-04-19 DIAGNOSIS — F34.1 DYSTHYMIA: Primary | ICD-10-CM

## 2018-04-19 PROCEDURE — 90832 PSYTX W PT 30 MINUTES: CPT | Performed by: PSYCHOLOGIST

## 2018-04-27 DIAGNOSIS — B35.1 ONYCHOMYCOSIS: ICD-10-CM

## 2018-04-27 DIAGNOSIS — J06.9 ACUTE UPPER RESPIRATORY INFECTION: ICD-10-CM

## 2018-05-01 RX ORDER — FLUCONAZOLE 100 MG/1
TABLET ORAL
Qty: 15 TABLET | Refills: 0 | Status: SHIPPED | OUTPATIENT
Start: 2018-05-01 | End: 2019-03-07 | Stop reason: SDUPTHER

## 2018-05-01 RX ORDER — BENZONATATE 100 MG/1
100-200 CAPSULE ORAL 3 TIMES DAILY PRN
Qty: 60 CAPSULE | Refills: 1 | Status: SHIPPED | OUTPATIENT
Start: 2018-05-01 | End: 2018-05-22 | Stop reason: SDUPTHER

## 2018-05-09 ENCOUNTER — OFFICE VISIT (OUTPATIENT)
Dept: PRIMARY CARE CLINIC | Age: 68
End: 2018-05-09
Payer: MEDICARE

## 2018-05-09 ENCOUNTER — CARE COORDINATION (OUTPATIENT)
Dept: CARE COORDINATION | Age: 68
End: 2018-05-09

## 2018-05-09 ENCOUNTER — TELEPHONE (OUTPATIENT)
Dept: PRIMARY CARE CLINIC | Age: 68
End: 2018-05-09

## 2018-05-09 VITALS
DIASTOLIC BLOOD PRESSURE: 78 MMHG | TEMPERATURE: 97 F | RESPIRATION RATE: 13 BRPM | HEART RATE: 68 BPM | SYSTOLIC BLOOD PRESSURE: 130 MMHG | HEIGHT: 66 IN | WEIGHT: 157.2 LBS | OXYGEN SATURATION: 97 % | BODY MASS INDEX: 25.26 KG/M2

## 2018-05-09 DIAGNOSIS — E78.5 DM TYPE 2 WITH DIABETIC DYSLIPIDEMIA (HCC): ICD-10-CM

## 2018-05-09 DIAGNOSIS — I10 HTN (HYPERTENSION), BENIGN: ICD-10-CM

## 2018-05-09 DIAGNOSIS — S22.42XA CLOSED FRACTURE OF MULTIPLE RIBS OF LEFT SIDE, INITIAL ENCOUNTER: Primary | ICD-10-CM

## 2018-05-09 DIAGNOSIS — E11.69 DM TYPE 2 WITH DIABETIC DYSLIPIDEMIA (HCC): ICD-10-CM

## 2018-05-09 DIAGNOSIS — M15.9 PRIMARY OSTEOARTHRITIS INVOLVING MULTIPLE JOINTS: ICD-10-CM

## 2018-05-09 DIAGNOSIS — F41.9 ANXIETY: ICD-10-CM

## 2018-05-09 PROCEDURE — 1036F TOBACCO NON-USER: CPT | Performed by: FAMILY MEDICINE

## 2018-05-09 PROCEDURE — G8427 DOCREV CUR MEDS BY ELIG CLIN: HCPCS | Performed by: FAMILY MEDICINE

## 2018-05-09 PROCEDURE — 3044F HG A1C LEVEL LT 7.0%: CPT | Performed by: FAMILY MEDICINE

## 2018-05-09 PROCEDURE — 3017F COLORECTAL CA SCREEN DOC REV: CPT | Performed by: FAMILY MEDICINE

## 2018-05-09 PROCEDURE — 1123F ACP DISCUSS/DSCN MKR DOCD: CPT | Performed by: FAMILY MEDICINE

## 2018-05-09 PROCEDURE — 4040F PNEUMOC VAC/ADMIN/RCVD: CPT | Performed by: FAMILY MEDICINE

## 2018-05-09 PROCEDURE — 2022F DILAT RTA XM EVC RTNOPTHY: CPT | Performed by: FAMILY MEDICINE

## 2018-05-09 PROCEDURE — 1090F PRES/ABSN URINE INCON ASSESS: CPT | Performed by: FAMILY MEDICINE

## 2018-05-09 PROCEDURE — 99213 OFFICE O/P EST LOW 20 MIN: CPT | Performed by: FAMILY MEDICINE

## 2018-05-09 PROCEDURE — G8399 PT W/DXA RESULTS DOCUMENT: HCPCS | Performed by: FAMILY MEDICINE

## 2018-05-09 PROCEDURE — G8419 CALC BMI OUT NRM PARAM NOF/U: HCPCS | Performed by: FAMILY MEDICINE

## 2018-05-09 RX ORDER — LIDOCAINE 50 MG/G
1 PATCH TOPICAL DAILY
Qty: 30 PATCH | Refills: 0 | Status: SHIPPED | OUTPATIENT
Start: 2018-05-09

## 2018-05-09 ASSESSMENT — ENCOUNTER SYMPTOMS
EYE REDNESS: 0
WHEEZING: 0
ABDOMINAL PAIN: 0
EYES NEGATIVE: 1
PHOTOPHOBIA: 0
GASTROINTESTINAL NEGATIVE: 1
EYE ITCHING: 0
SHORTNESS OF BREATH: 0
DIARRHEA: 0
CONSTIPATION: 0
RESPIRATORY NEGATIVE: 1
BACK PAIN: 1

## 2018-05-22 ENCOUNTER — OFFICE VISIT (OUTPATIENT)
Dept: PRIMARY CARE CLINIC | Age: 68
End: 2018-05-22
Payer: MEDICARE

## 2018-05-22 ENCOUNTER — OFFICE VISIT (OUTPATIENT)
Dept: BEHAVIORAL/MENTAL HEALTH CLINIC | Age: 68
End: 2018-05-22
Payer: MEDICARE

## 2018-05-22 VITALS
HEART RATE: 79 BPM | RESPIRATION RATE: 16 BRPM | SYSTOLIC BLOOD PRESSURE: 126 MMHG | TEMPERATURE: 98.7 F | BODY MASS INDEX: 24.91 KG/M2 | HEIGHT: 66 IN | WEIGHT: 155 LBS | DIASTOLIC BLOOD PRESSURE: 72 MMHG | OXYGEN SATURATION: 96 %

## 2018-05-22 DIAGNOSIS — E78.5 DYSLIPIDEMIA: ICD-10-CM

## 2018-05-22 DIAGNOSIS — E78.5 DM TYPE 2 WITH DIABETIC DYSLIPIDEMIA (HCC): Primary | ICD-10-CM

## 2018-05-22 DIAGNOSIS — D51.9 ANEMIA DUE TO VITAMIN B12 DEFICIENCY, UNSPECIFIED B12 DEFICIENCY TYPE: ICD-10-CM

## 2018-05-22 DIAGNOSIS — F02.83 DEMENTIA OF THE ALZHEIMER'S TYPE, WITH EARLY ONSET, WITH DEPRESSIVE MOOD (HCC): ICD-10-CM

## 2018-05-22 DIAGNOSIS — J06.9 ACUTE UPPER RESPIRATORY INFECTION: ICD-10-CM

## 2018-05-22 DIAGNOSIS — E78.5 DM TYPE 2 WITH DIABETIC DYSLIPIDEMIA (HCC): ICD-10-CM

## 2018-05-22 DIAGNOSIS — E11.69 DM TYPE 2 WITH DIABETIC DYSLIPIDEMIA (HCC): ICD-10-CM

## 2018-05-22 DIAGNOSIS — F34.1 DYSTHYMIA: Primary | ICD-10-CM

## 2018-05-22 DIAGNOSIS — I10 HTN (HYPERTENSION), BENIGN: ICD-10-CM

## 2018-05-22 DIAGNOSIS — G30.0 DEMENTIA OF THE ALZHEIMER'S TYPE, WITH EARLY ONSET, WITH DEPRESSIVE MOOD (HCC): ICD-10-CM

## 2018-05-22 DIAGNOSIS — E11.69 DM TYPE 2 WITH DIABETIC DYSLIPIDEMIA (HCC): Primary | ICD-10-CM

## 2018-05-22 LAB
ALBUMIN SERPL-MCNC: 4.6 G/DL (ref 3.9–4.9)
ALP BLD-CCNC: 146 U/L (ref 40–130)
ALT SERPL-CCNC: 24 U/L (ref 0–33)
ANION GAP SERPL CALCULATED.3IONS-SCNC: 14 MEQ/L (ref 7–13)
AST SERPL-CCNC: 26 U/L (ref 0–35)
BASOPHILS ABSOLUTE: 0.1 K/UL (ref 0–0.2)
BASOPHILS RELATIVE PERCENT: 1 %
BILIRUB SERPL-MCNC: 0.4 MG/DL (ref 0–1.2)
BUN BLDV-MCNC: 11 MG/DL (ref 8–23)
CALCIUM SERPL-MCNC: 10.1 MG/DL (ref 8.6–10.2)
CHLORIDE BLD-SCNC: 100 MEQ/L (ref 98–107)
CHOLESTEROL, TOTAL: 165 MG/DL (ref 0–199)
CO2: 27 MEQ/L (ref 22–29)
CREAT SERPL-MCNC: 0.42 MG/DL (ref 0.5–0.9)
CREATININE URINE: 65.4 MG/DL
EOSINOPHILS ABSOLUTE: 0.3 K/UL (ref 0–0.7)
EOSINOPHILS RELATIVE PERCENT: 4.7 %
GFR AFRICAN AMERICAN: >60
GFR NON-AFRICAN AMERICAN: >60
GLOBULIN: 2.2 G/DL (ref 2.3–3.5)
GLUCOSE BLD-MCNC: 176 MG/DL (ref 74–109)
GLUCOSE BLD-MCNC: 243 MG/DL
HBA1C MFR BLD: 6.3 %
HCT VFR BLD CALC: 37.7 % (ref 37–47)
HDLC SERPL-MCNC: 57 MG/DL (ref 40–59)
HEMOGLOBIN: 12.5 G/DL (ref 12–16)
LDL CHOLESTEROL CALCULATED: 57 MG/DL (ref 0–129)
LYMPHOCYTES ABSOLUTE: 2 K/UL (ref 1–4.8)
LYMPHOCYTES RELATIVE PERCENT: 28.8 %
MCH RBC QN AUTO: 31.2 PG (ref 27–31.3)
MCHC RBC AUTO-ENTMCNC: 33.1 % (ref 33–37)
MCV RBC AUTO: 94.4 FL (ref 82–100)
MICROALBUMIN UR-MCNC: <1.2 MG/DL
MICROALBUMIN/CREAT UR-RTO: NORMAL MG/G (ref 0–30)
MONOCYTES ABSOLUTE: 0.6 K/UL (ref 0.2–0.8)
MONOCYTES RELATIVE PERCENT: 8.4 %
NEUTROPHILS ABSOLUTE: 3.9 K/UL (ref 1.4–6.5)
NEUTROPHILS RELATIVE PERCENT: 57.1 %
PDW BLD-RTO: 12.9 % (ref 11.5–14.5)
PLATELET # BLD: 258 K/UL (ref 130–400)
POTASSIUM SERPL-SCNC: 4.1 MEQ/L (ref 3.5–5.1)
RBC # BLD: 4 M/UL (ref 4.2–5.4)
SODIUM BLD-SCNC: 141 MEQ/L (ref 132–144)
TOTAL PROTEIN: 6.8 G/DL (ref 6.4–8.1)
TRIGL SERPL-MCNC: 254 MG/DL (ref 0–200)
WBC # BLD: 6.9 K/UL (ref 4.8–10.8)

## 2018-05-22 PROCEDURE — 90832 PSYTX W PT 30 MINUTES: CPT | Performed by: PSYCHOLOGIST

## 2018-05-22 PROCEDURE — 1123F ACP DISCUSS/DSCN MKR DOCD: CPT | Performed by: FAMILY MEDICINE

## 2018-05-22 PROCEDURE — 83036 HEMOGLOBIN GLYCOSYLATED A1C: CPT | Performed by: FAMILY MEDICINE

## 2018-05-22 PROCEDURE — 3044F HG A1C LEVEL LT 7.0%: CPT | Performed by: FAMILY MEDICINE

## 2018-05-22 PROCEDURE — 2022F DILAT RTA XM EVC RTNOPTHY: CPT | Performed by: FAMILY MEDICINE

## 2018-05-22 PROCEDURE — 1090F PRES/ABSN URINE INCON ASSESS: CPT | Performed by: FAMILY MEDICINE

## 2018-05-22 PROCEDURE — 4040F PNEUMOC VAC/ADMIN/RCVD: CPT | Performed by: FAMILY MEDICINE

## 2018-05-22 PROCEDURE — 99214 OFFICE O/P EST MOD 30 MIN: CPT | Performed by: FAMILY MEDICINE

## 2018-05-22 PROCEDURE — G8399 PT W/DXA RESULTS DOCUMENT: HCPCS | Performed by: FAMILY MEDICINE

## 2018-05-22 PROCEDURE — 3017F COLORECTAL CA SCREEN DOC REV: CPT | Performed by: FAMILY MEDICINE

## 2018-05-22 PROCEDURE — 1036F TOBACCO NON-USER: CPT | Performed by: FAMILY MEDICINE

## 2018-05-22 PROCEDURE — G8427 DOCREV CUR MEDS BY ELIG CLIN: HCPCS | Performed by: FAMILY MEDICINE

## 2018-05-22 PROCEDURE — 82962 GLUCOSE BLOOD TEST: CPT | Performed by: FAMILY MEDICINE

## 2018-05-22 PROCEDURE — G8419 CALC BMI OUT NRM PARAM NOF/U: HCPCS | Performed by: FAMILY MEDICINE

## 2018-05-22 RX ORDER — DONEPEZIL HYDROCHLORIDE 10 MG/1
10 TABLET, FILM COATED ORAL NIGHTLY
Qty: 180 TABLET | Refills: 1 | Status: SHIPPED | OUTPATIENT
Start: 2018-05-22 | End: 2018-10-26 | Stop reason: SDUPTHER

## 2018-05-22 RX ORDER — GLIPIZIDE 5 MG/1
5 TABLET, FILM COATED, EXTENDED RELEASE ORAL DAILY
Qty: 60 TABLET | Refills: 3 | Status: SHIPPED | OUTPATIENT
Start: 2018-05-22 | End: 2018-10-26 | Stop reason: SDUPTHER

## 2018-05-22 RX ORDER — ERYTHROMYCIN 5 MG/G
OINTMENT OPHTHALMIC
Refills: 6 | COMMUNITY
Start: 2018-05-12 | End: 2019-03-07 | Stop reason: SDUPTHER

## 2018-05-22 RX ORDER — BENZONATATE 100 MG/1
100-200 CAPSULE ORAL 3 TIMES DAILY PRN
Qty: 60 CAPSULE | Refills: 1 | Status: SHIPPED | OUTPATIENT
Start: 2018-05-22 | End: 2018-10-26

## 2018-05-22 ASSESSMENT — ENCOUNTER SYMPTOMS
ABDOMINAL PAIN: 0
PHOTOPHOBIA: 0
GASTROINTESTINAL NEGATIVE: 1
EYE ITCHING: 0
CONSTIPATION: 0
RESPIRATORY NEGATIVE: 1
EYES NEGATIVE: 1
SHORTNESS OF BREATH: 0
WHEEZING: 0
DIARRHEA: 0
EYE REDNESS: 0
BACK PAIN: 0

## 2018-05-25 ENCOUNTER — CARE COORDINATION (OUTPATIENT)
Dept: CARE COORDINATION | Age: 68
End: 2018-05-25

## 2018-06-19 ENCOUNTER — OFFICE VISIT (OUTPATIENT)
Dept: BEHAVIORAL/MENTAL HEALTH CLINIC | Age: 68
End: 2018-06-19
Payer: MEDICARE

## 2018-06-19 DIAGNOSIS — F41.9 ANXIETY: ICD-10-CM

## 2018-06-19 DIAGNOSIS — F34.1 DYSTHYMIA: Primary | ICD-10-CM

## 2018-06-19 PROCEDURE — 90832 PSYTX W PT 30 MINUTES: CPT | Performed by: PSYCHOLOGIST

## 2018-06-19 ASSESSMENT — PATIENT HEALTH QUESTIONNAIRE - PHQ9
3. TROUBLE FALLING OR STAYING ASLEEP: 0
10. IF YOU CHECKED OFF ANY PROBLEMS, HOW DIFFICULT HAVE THESE PROBLEMS MADE IT FOR YOU TO DO YOUR WORK, TAKE CARE OF THINGS AT HOME, OR GET ALONG WITH OTHER PEOPLE: 1
4. FEELING TIRED OR HAVING LITTLE ENERGY: 1
5. POOR APPETITE OR OVEREATING: 2
7. TROUBLE CONCENTRATING ON THINGS, SUCH AS READING THE NEWSPAPER OR WATCHING TELEVISION: 0
1. LITTLE INTEREST OR PLEASURE IN DOING THINGS: 0
2. FEELING DOWN, DEPRESSED OR HOPELESS: 1
SUM OF ALL RESPONSES TO PHQ QUESTIONS 1-9: 5
8. MOVING OR SPEAKING SO SLOWLY THAT OTHER PEOPLE COULD HAVE NOTICED. OR THE OPPOSITE, BEING SO FIGETY OR RESTLESS THAT YOU HAVE BEEN MOVING AROUND A LOT MORE THAN USUAL: 1
9. THOUGHTS THAT YOU WOULD BE BETTER OFF DEAD, OR OF HURTING YOURSELF: 0
6. FEELING BAD ABOUT YOURSELF - OR THAT YOU ARE A FAILURE OR HAVE LET YOURSELF OR YOUR FAMILY DOWN: 0
SUM OF ALL RESPONSES TO PHQ9 QUESTIONS 1 & 2: 1

## 2018-06-27 ENCOUNTER — CARE COORDINATION (OUTPATIENT)
Dept: CARE COORDINATION | Age: 68
End: 2018-06-27

## 2018-07-17 ENCOUNTER — OFFICE VISIT (OUTPATIENT)
Dept: BEHAVIORAL/MENTAL HEALTH CLINIC | Age: 68
End: 2018-07-17
Payer: MEDICARE

## 2018-07-17 DIAGNOSIS — F34.1 DYSTHYMIA: Primary | ICD-10-CM

## 2018-07-17 DIAGNOSIS — F41.9 ANXIETY: ICD-10-CM

## 2018-07-17 PROCEDURE — 90832 PSYTX W PT 30 MINUTES: CPT | Performed by: PSYCHOLOGIST

## 2018-07-17 PROCEDURE — 1036F TOBACCO NON-USER: CPT | Performed by: PSYCHOLOGIST

## 2018-07-17 NOTE — PROGRESS NOTES
Behavioral Health Consultation  Tracie Malhotra PsyD. Psychologist  7/17/18  1:17 PM      Time spent with Patient: 20 minutes  This is patient's 17th  [facility] appointment. Reason for Consult:  depression, anxiety and memory loss  Referring Provider: Alejandrina Stack DO  Via [employer] [address], [address] [facility], [address] [facility] [place]    Feedback given to PCP. S:  Pt reports that she has been walking but not as much as she should. She reports that has started cooking a little bit more and would like to eat healthier. Pt reports that her energy level has been better and has been pacing herself in activities. She reports that she is visiting her new born granddaughter today which she is looking forward to. Pt reports that she would like to reach out to her friends more often. No SI/HI.           Diagnosis Date    Allergic rhinitis     Chronic sinusitis     Depression     Hx of blood clots     leg    Hyperlipidemia     Hypertension     Menopause     Thyroid disease     Tobacco abuse     Type II or unspecified type diabetes mellitus without mention of complication, not stated as uncontrolled        O:  MSE:  Appearance    alert, cooperative  Appetite abnormal  Sleep disturbance Yes  Fatigue Yes  Loss of pleasure No  Impulsive behavior No  Speech    spontaneous, normal rate and normal volume  Mood    Neutral  Affect    Neutral  Thought Content    intact  Thought Process    goal directed, coherent and tangential  Associations    tangential connections  Insight    Good  Judgment    Intact  Orientation    oriented to person, place, time, and general circumstances  Memory    remote memory intact, recent memory impaired  Attention/Concentration    impaired  Morbid ideation No  Suicide Assessment    no suicidal ideation      History:    Medications:   Current Outpatient Prescriptions   Medication Sig Dispense Refill    benzonatate (TESSALON PERLES) 100 MG capsule Take 1-2 capsules by mouth 3 times daily as needed for Cough 60 capsule 1    erythromycin (ROMYCIN) 5 MG/GM ophthalmic ointment apply a small amount into both eyes every night as needed  6    glipiZIDE (GLIPIZIDE XL) 5 MG extended release tablet Take 1 tablet by mouth daily TAKE ONE TABLET  bid 60 tablet 3    donepezil (ARICEPT) 10 MG tablet Take 1 tablet by mouth nightly 180 tablet 1    lidocaine (LIDODERM) 5 % Place 1 patch onto the skin daily 12 hours on, 12 hours off. 30 patch 0    fluconazole (DIFLUCAN) 100 MG tablet TAKE ONE TABLET BY MOUTH EVERY DAY 15 tablet 0    memantine (NAMENDA) 10 MG tablet Take 1 tablet by mouth 2 times daily 180 tablet 3    simvastatin (ZOCOR) 40 MG tablet Take 1 tablet by mouth nightly 90 tablet 3    buPROPion (WELLBUTRIN SR) 150 MG extended release tablet Take 1 tablet by mouth twice a day 180 tablet 3    acyclovir (ZOVIRAX) 400 MG tablet TAKE ONE TABLET BY MOUTH DAILY 90 tablet 3    citalopram (CELEXA) 20 MG tablet Take 1 tablet by mouth daily 90 tablet 3    diclofenac (VOLTAREN) 75 MG EC tablet Take 1 tablet by mouth twice a day with meals 180 tablet 3    hydrOXYzine (ATARAX) 10 MG tablet TAKE ONE TABLET BY MOUTH DAILY AS NEEDED FOR ITCHING 90 tablet 1    ranitidine (ZANTAC) 150 MG tablet Take 1 tablet by mouth 2 times daily 180 tablet 1    fluticasone (FLONASE) 50 MCG/ACT nasal spray Use 2 sprays nasally daily 3 Bottle 3    pioglitazone (ACTOS) 30 MG tablet TAKE ONE TABLET BY MOUTH EVERY DAY 90 tablet 3    Probiotic Product (ALIGN) 4 MG CAPS Take 1 capsule by mouth daily 90 capsule 3    losartan (COZAAR) 50 MG tablet One daily 90 tablet 3    Tuberculin-Allergy Syringes 28G X 1/2\" 1 ML MISC 1 each by Does not apply route daily as needed (as prescribed) 100 each 2    Calcium Citrate-Vitamin D (CALCIUM + D PO) Take by mouth      Fexofenadine HCl (ALLEGRA PO) Take by mouth      Multiple Vitamin (MULTIVITAMIN PO) Take  by mouth daily. No current facility-administered medications for this visit.         Social History: Social History     Social History    Marital status:      Spouse name: N/A    Number of children: N/A    Years of education: N/A     Occupational History    Not on file. Social History Main Topics    Smoking status: Former Smoker     Packs/day: 1.50     Years: 20.00     Quit date: 1/1/1996    Smokeless tobacco: Never Used    Alcohol use 1.0 oz/week     2 Standard drinks or equivalent per week    Drug use: No    Sexual activity: Not Currently     Other Topics Concern    Not on file     Social History Narrative    No narrative on file         Family History:   Family History   Problem Relation Age of Onset    Diabetes Mother     High Blood Pressure Sister        TOBACCO:   reports that she quit smoking about 22 years ago. She has a 30.00 pack-year smoking history. She has never used smokeless tobacco.  ETOH:   reports that she drinks about 1.0 oz of alcohol per week . A:  Pt reports some improvement in mood and stress level. Pt would likely benefit from continued [facility] services to increase coping skills and provide symptom control and relief. PHQ Scores 6/19/2018 3/20/2018 2/20/2018 12/6/2017 10/19/2017 10/5/2017 9/21/2017   PHQ2 Score 1 2 2 1 1 0 1   PHQ9 Score 5 6 6 5 4 3 2     Interpretation of Total Score Depression Severity: 1-4 = Minimal depression, 5-9 = Mild depression, 10-14 = Moderate depression, 15-19 = Moderately severe depression, 20-27 = Severe depression      Diagnosis:    Dysthymia  Unspecified anxiety    Plan:  Pt interventions:  [place]-setting to identify pt's primary goals for [facility] visit / overall health, Supportive techniques, Identified relevant behavioral strategies for targeting anxiety/depression/memory loss including use of a short check list of to-do items each day and Collaboratively set goals with pt re: lifestyle habits (weight loss, exercise, and healthy eating). Pt Behavioral Change Plan:  Return in 1 month.     Please note this report has been partially produced using speech recognition software and may cause contain errors related to that system including grammar, punctuation and spelling as well as words and phrases that may seem inappropriate. If there are questions or concerns please feel free to contact me to clarify.

## 2018-07-31 ENCOUNTER — OFFICE VISIT (OUTPATIENT)
Dept: PRIMARY CARE CLINIC | Age: 68
End: 2018-07-31
Payer: MEDICARE

## 2018-07-31 VITALS
WEIGHT: 152.6 LBS | SYSTOLIC BLOOD PRESSURE: 138 MMHG | TEMPERATURE: 98.3 F | BODY MASS INDEX: 24.53 KG/M2 | DIASTOLIC BLOOD PRESSURE: 70 MMHG | OXYGEN SATURATION: 95 % | HEIGHT: 66 IN | HEART RATE: 82 BPM

## 2018-07-31 DIAGNOSIS — I10 HTN (HYPERTENSION), BENIGN: ICD-10-CM

## 2018-07-31 DIAGNOSIS — H92.01 OTALGIA OF RIGHT EAR: ICD-10-CM

## 2018-07-31 DIAGNOSIS — E78.5 DM TYPE 2 WITH DIABETIC DYSLIPIDEMIA (HCC): ICD-10-CM

## 2018-07-31 DIAGNOSIS — E11.69 DM TYPE 2 WITH DIABETIC DYSLIPIDEMIA (HCC): ICD-10-CM

## 2018-07-31 DIAGNOSIS — J02.0 ACUTE STREPTOCOCCAL PHARYNGITIS: Primary | ICD-10-CM

## 2018-07-31 PROCEDURE — 1090F PRES/ABSN URINE INCON ASSESS: CPT | Performed by: FAMILY MEDICINE

## 2018-07-31 PROCEDURE — 99213 OFFICE O/P EST LOW 20 MIN: CPT | Performed by: FAMILY MEDICINE

## 2018-07-31 PROCEDURE — 1123F ACP DISCUSS/DSCN MKR DOCD: CPT | Performed by: FAMILY MEDICINE

## 2018-07-31 PROCEDURE — 1101F PT FALLS ASSESS-DOCD LE1/YR: CPT | Performed by: FAMILY MEDICINE

## 2018-07-31 PROCEDURE — 3017F COLORECTAL CA SCREEN DOC REV: CPT | Performed by: FAMILY MEDICINE

## 2018-07-31 PROCEDURE — G8427 DOCREV CUR MEDS BY ELIG CLIN: HCPCS | Performed by: FAMILY MEDICINE

## 2018-07-31 PROCEDURE — 2022F DILAT RTA XM EVC RTNOPTHY: CPT | Performed by: FAMILY MEDICINE

## 2018-07-31 PROCEDURE — G8399 PT W/DXA RESULTS DOCUMENT: HCPCS | Performed by: FAMILY MEDICINE

## 2018-07-31 PROCEDURE — 3044F HG A1C LEVEL LT 7.0%: CPT | Performed by: FAMILY MEDICINE

## 2018-07-31 PROCEDURE — G8419 CALC BMI OUT NRM PARAM NOF/U: HCPCS | Performed by: FAMILY MEDICINE

## 2018-07-31 PROCEDURE — 4040F PNEUMOC VAC/ADMIN/RCVD: CPT | Performed by: FAMILY MEDICINE

## 2018-07-31 PROCEDURE — 1036F TOBACCO NON-USER: CPT | Performed by: FAMILY MEDICINE

## 2018-07-31 RX ORDER — AMOXICILLIN 500 MG/1
500 TABLET, FILM COATED ORAL 3 TIMES DAILY
Qty: 30 TABLET | Refills: 0 | Status: SHIPPED | OUTPATIENT
Start: 2018-07-31 | End: 2018-09-06 | Stop reason: ALTCHOICE

## 2018-07-31 ASSESSMENT — ENCOUNTER SYMPTOMS
RHINORRHEA: 0
NAUSEA: 0
CHOKING: 0
DIARRHEA: 0
WHEEZING: 0
SINUS PAIN: 0
CONSTIPATION: 0
SINUS PRESSURE: 0
VOMITING: 0
COUGH: 0
BACK PAIN: 0
ABDOMINAL PAIN: 0
TROUBLE SWALLOWING: 0
SORE THROAT: 1
RESPIRATORY NEGATIVE: 1
SHORTNESS OF BREATH: 0

## 2018-07-31 NOTE — PROGRESS NOTES
Subjective:      Patient ID: Duncan Lemus is a 77 y.o. female who presents today for:  Chief Complaint   Patient presents with    Laryngitis     x2 days ago, pt lost her voice, pt drank tea that help loosen it up a bit, but still does not fully have her voice back.  Health Maintenance     pt states she got her eye exam in december. HPI    Patient is here for laryngitis x 2 days. She has a sore throat,  But no acid reflux and congestion. Past Medical History:   Diagnosis Date    Allergic rhinitis     Chronic sinusitis     Depression     Hx of blood clots     leg    Hyperlipidemia     Hypertension     Menopause     Thyroid disease     Tobacco abuse     Type II or unspecified type diabetes mellitus without mention of complication, not stated as uncontrolled      Past Surgical History:   Procedure Laterality Date    CERVIX SURGERY      CONGATION OF CERVIX    EYE SURGERY Bilateral     lasik    FINGER TRIGGER RELEASE  10/18/2016    HYSTERECTOMY      NASAL SEPTUM SURGERY  11/1999    [place] COLON CA SCRN NOT  [address] IND N/A 7/21/2017    COLONOSCOPY performed by Maikel Lopez MD at [place] [id number] ENDOSCOPY  08/23/2012     Family History   Problem Relation Age of Onset    Diabetes Mother     High Blood Pressure Sister      Social History     Social History    Marital status:      Spouse name: N/A    Number of children: N/A    Years of education: N/A     Occupational History    Not on file.      Social History Main Topics    Smoking status: Former Smoker     Packs/day: 1.50     Years: 20.00     Quit date: 1/1/1996    Smokeless tobacco: Never Used    Alcohol use 1.0 oz/week     2 Standard drinks or equivalent per week    Drug use: No    Sexual activity: Not Currently     Other Topics Concern    Not on file     Social History Narrative    No narrative on file     Allergies:  Augmentin [amoxicillin-pot clavulanate]; Cephalexin; Ciprofloxacin; Darvocet [propoxyphene n-acetaminophen]; Effexor [venlafaxine]; Macrobid [nitrofurantoin monohyd macro]; Percocet [oxycodone-acetaminophen]; Sulfa antibiotics; Zithromax [azithromycin]; and Vicodin [hydrocodone-acetaminophen]    Review of Systems   Constitutional: Negative for chills, fatigue and fever. HENT: Positive for sore throat. Negative for congestion, ear pain, rhinorrhea, sinus pain, sinus pressure, sneezing and trouble swallowing. Respiratory: Negative. Negative for cough, choking, shortness of breath and wheezing. Cardiovascular: Negative for chest pain and palpitations. Gastrointestinal: Negative for abdominal pain, constipation, diarrhea, nausea and vomiting. Musculoskeletal: Negative for back pain and neck pain. Neurological: Negative for dizziness and headaches. Objective:   /70 (Site: Right Arm, Position: Sitting, Cuff Size: Medium Adult)   Pulse 82   Temp 98.3 °F (36.8 °C) (Oral)   Ht 5' 5.5\" (1.664 m)   Wt 152 lb 9.6 oz (69.2 kg)   SpO2 95%   BMI 25.01 kg/m²     Physical Exam   Constitutional: She is oriented to person, place, and time. She appears well-developed and well-nourished. HENT:   Head: Normocephalic and atraumatic. Mouth/Throat: Posterior oropharyngeal erythema present. No oropharyngeal exudate. Eyes: Conjunctivae and EOM are normal. Pupils are equal, round, and reactive to light. Neck: Normal range of motion. Neck supple. No thyromegaly present. Cardiovascular: Normal rate, regular rhythm and normal heart sounds. No murmur heard. Pulmonary/Chest: Effort normal and breath sounds normal. No respiratory distress. She has no wheezes. She has no rales. She exhibits no tenderness. Abdominal: Soft. Bowel sounds are normal. There is no tenderness. Musculoskeletal: Normal range of motion. She exhibits no edema or tenderness. Lymphadenopathy:     She has no cervical adenopathy.    Neurological: She is alert and oriented to person, place, and time. She has normal reflexes. Coordination normal.   Skin: Skin is warm and dry. No rash noted. Psychiatric: She has a normal mood and affect. Thought content normal.   Nursing note and vitals reviewed. Assessment:      Diagnosis Orders   1. Acute streptococcal pharyngitis  Amoxicillin 500 MG TABS   2. HTN (hypertension), benign     3. DM type 2 with diabetic dyslipidemia ([address]   DIABETES FOOT EXAM       Plan:      Orders Placed This Encounter   Procedures     DIABETES FOOT EXAM     Orders Placed This Encounter   Medications    Amoxicillin 500 MG TABS     Sig: Take 500 mg by mouth 3 times daily     Dispense:  30 tablet     Refill:  0    neomycin-polymyxin-hydrocortisone (CORTISPORIN) 3.5-17479-7 otic solution     Sig: Place 3 drops in ear(s) 3 times daily for 5 days     Dispense:  1 each     Refill:  1       Controlled Substances Monitoring:      Return in about 3 months (around 10/31/2018) for Review of Labs & Diagnostic Testing, Routine follow up. IKavita, am scribing for and in the presence of Rhys Beebe DO. Electronically signed by : Rhys Diana DO, personally performed the services described in this documentation, as scribed by Kavita Ayers in my presence, and it is both accurate and complete.  Electronically signed by: Rhys Beebe DO    7/31/18 11:09 PM    Rhys Beebe DO'

## 2018-08-06 ENCOUNTER — OFFICE VISIT (OUTPATIENT)
Dept: PRIMARY CARE CLINIC | Age: 68
End: 2018-08-06
Payer: MEDICARE

## 2018-08-06 VITALS
SYSTOLIC BLOOD PRESSURE: 120 MMHG | RESPIRATION RATE: 12 BRPM | HEART RATE: 80 BPM | WEIGHT: 152 LBS | TEMPERATURE: 98.1 F | BODY MASS INDEX: 24.43 KG/M2 | DIASTOLIC BLOOD PRESSURE: 60 MMHG | HEIGHT: 66 IN

## 2018-08-06 DIAGNOSIS — J01.00 ACUTE NON-RECURRENT MAXILLARY SINUSITIS: Primary | ICD-10-CM

## 2018-08-06 PROCEDURE — 1101F PT FALLS ASSESS-DOCD LE1/YR: CPT | Performed by: NURSE PRACTITIONER

## 2018-08-06 PROCEDURE — G8427 DOCREV CUR MEDS BY ELIG CLIN: HCPCS | Performed by: NURSE PRACTITIONER

## 2018-08-06 PROCEDURE — 3017F COLORECTAL CA SCREEN DOC REV: CPT | Performed by: NURSE PRACTITIONER

## 2018-08-06 PROCEDURE — 96372 THER/PROPH/DIAG INJ SC/IM: CPT | Performed by: NURSE PRACTITIONER

## 2018-08-06 PROCEDURE — G8420 CALC BMI NORM PARAMETERS: HCPCS | Performed by: NURSE PRACTITIONER

## 2018-08-06 PROCEDURE — 1036F TOBACCO NON-USER: CPT | Performed by: NURSE PRACTITIONER

## 2018-08-06 PROCEDURE — 1123F ACP DISCUSS/DSCN MKR DOCD: CPT | Performed by: NURSE PRACTITIONER

## 2018-08-06 PROCEDURE — 4040F PNEUMOC VAC/ADMIN/RCVD: CPT | Performed by: NURSE PRACTITIONER

## 2018-08-06 PROCEDURE — G8399 PT W/DXA RESULTS DOCUMENT: HCPCS | Performed by: NURSE PRACTITIONER

## 2018-08-06 PROCEDURE — 99213 OFFICE O/P EST LOW 20 MIN: CPT | Performed by: NURSE PRACTITIONER

## 2018-08-06 PROCEDURE — 1090F PRES/ABSN URINE INCON ASSESS: CPT | Performed by: NURSE PRACTITIONER

## 2018-08-06 RX ORDER — CEFTRIAXONE 1 G/1
1 INJECTION, POWDER, FOR SOLUTION INTRAMUSCULAR; INTRAVENOUS ONCE
Status: COMPLETED | OUTPATIENT
Start: 2018-08-06 | End: 2018-08-06

## 2018-08-06 RX ADMIN — CEFTRIAXONE 1 G: 1 INJECTION, POWDER, FOR SOLUTION INTRAMUSCULAR; INTRAVENOUS at 17:22

## 2018-08-06 ASSESSMENT — ENCOUNTER SYMPTOMS
SHORTNESS OF BREATH: 0
RHINORRHEA: 0
SINUS PRESSURE: 1
SORE THROAT: 1
COUGH: 1
WHEEZING: 0

## 2018-08-06 NOTE — PATIENT INSTRUCTIONS
Patient Education        Saline Nasal Washes: Care Instructions  Your Care Instructions  Saline nasal washes help keep the nasal passages open by washing out thick or dried mucus. This simple remedy can help relieve symptoms of allergies, sinusitis, and colds. It also can make the nose feel more comfortable by keeping the mucous membranes moist. You may notice a little burning sensation in your nose the first few times you use the solution, but this usually gets better in a few days. Follow-up care is a key part of your treatment and safety. Be sure to make and go to all appointments, and call your doctor if you are having problems. It's also a good idea to know your test results and keep a list of the medicines you take. How can you care for yourself at home? · You can buy premixed saline solution in a squeeze bottle or other sinus rinse products at a drugstore. Read and follow the instructions on the label. · You also can make your own saline solution by adding 1 teaspoon of salt and 1 teaspoon of baking soda to 2 cups of distilled water. · If you use a homemade solution, pour a small amount into a clean bowl. Using a rubber bulb syringe, squeeze the syringe and place the tip in the salt water. Pull a small amount of the salt water into the syringe by relaxing your hand. · Sit down with your head tilted slightly back. Do not lie down. Put the tip of the bulb syringe or the squeeze bottle a little way into one of your nostrils. Gently drip or squirt a few drops into the nostril. Repeat with the other nostril. Some sneezing and gagging are normal at first.  · Gently blow your nose. · Wipe the syringe or bottle tip clean after each use. · Repeat this 2 or 3 times a day. · Use nasal washes gently if you have nosebleeds often. When should you call for help?   Watch closely for changes in your health, and be sure to contact your doctor if:    · You often get nosebleeds.     · You have problems doing the nasal washes. Where can you learn more? Go to [email or web address] org and sign in to your [employer] account. Enter [phone] in the [facility] box to learn more about \"Saline Nasal Washes: Care Instructions. \"     If you do not have an account, please click on the \"Sign Up Now\" link. Current as of: May 12, 2017  Content Version: 11.6  © 6152-3416 [employer]. Care instructions adapted under license by [facility] ([facility]). If you have questions about a medical condition or this instruction, always ask your healthcare professional. [address] any warranty or liability for your use of this information.

## 2018-09-06 ENCOUNTER — OFFICE VISIT (OUTPATIENT)
Dept: PRIMARY CARE CLINIC | Age: 68
End: 2018-09-06
Payer: MEDICARE

## 2018-09-06 VITALS
HEART RATE: 80 BPM | RESPIRATION RATE: 16 BRPM | WEIGHT: 149.4 LBS | BODY MASS INDEX: 24.01 KG/M2 | TEMPERATURE: 97.7 F | SYSTOLIC BLOOD PRESSURE: 118 MMHG | DIASTOLIC BLOOD PRESSURE: 70 MMHG | HEIGHT: 66 IN | OXYGEN SATURATION: 96 %

## 2018-09-06 DIAGNOSIS — J40 BRONCHITIS: ICD-10-CM

## 2018-09-06 DIAGNOSIS — I10 HTN (HYPERTENSION), BENIGN: ICD-10-CM

## 2018-09-06 DIAGNOSIS — E11.69 DM TYPE 2 WITH DIABETIC DYSLIPIDEMIA (HCC): Primary | ICD-10-CM

## 2018-09-06 DIAGNOSIS — F41.9 ANXIETY: ICD-10-CM

## 2018-09-06 DIAGNOSIS — J30.1 ALLERGIC RHINITIS DUE TO POLLEN, UNSPECIFIED SEASONALITY: ICD-10-CM

## 2018-09-06 DIAGNOSIS — K59.00 CONSTIPATION, UNSPECIFIED CONSTIPATION TYPE: ICD-10-CM

## 2018-09-06 DIAGNOSIS — E78.5 DM TYPE 2 WITH DIABETIC DYSLIPIDEMIA (HCC): Primary | ICD-10-CM

## 2018-09-06 PROCEDURE — G8427 DOCREV CUR MEDS BY ELIG CLIN: HCPCS | Performed by: FAMILY MEDICINE

## 2018-09-06 PROCEDURE — 1101F PT FALLS ASSESS-DOCD LE1/YR: CPT | Performed by: FAMILY MEDICINE

## 2018-09-06 PROCEDURE — G8420 CALC BMI NORM PARAMETERS: HCPCS | Performed by: FAMILY MEDICINE

## 2018-09-06 PROCEDURE — 3044F HG A1C LEVEL LT 7.0%: CPT | Performed by: FAMILY MEDICINE

## 2018-09-06 PROCEDURE — 1090F PRES/ABSN URINE INCON ASSESS: CPT | Performed by: FAMILY MEDICINE

## 2018-09-06 PROCEDURE — G8399 PT W/DXA RESULTS DOCUMENT: HCPCS | Performed by: FAMILY MEDICINE

## 2018-09-06 PROCEDURE — 3017F COLORECTAL CA SCREEN DOC REV: CPT | Performed by: FAMILY MEDICINE

## 2018-09-06 PROCEDURE — 1036F TOBACCO NON-USER: CPT | Performed by: FAMILY MEDICINE

## 2018-09-06 PROCEDURE — 1123F ACP DISCUSS/DSCN MKR DOCD: CPT | Performed by: FAMILY MEDICINE

## 2018-09-06 PROCEDURE — 4040F PNEUMOC VAC/ADMIN/RCVD: CPT | Performed by: FAMILY MEDICINE

## 2018-09-06 PROCEDURE — 99214 OFFICE O/P EST MOD 30 MIN: CPT | Performed by: FAMILY MEDICINE

## 2018-09-06 PROCEDURE — 2022F DILAT RTA XM EVC RTNOPTHY: CPT | Performed by: FAMILY MEDICINE

## 2018-09-06 RX ORDER — CALCIUM POLYCARBOPHIL 625 MG
TABLET ORAL
Qty: 14 TABLET | Refills: 0 | Status: SHIPPED | OUTPATIENT
Start: 2018-09-06

## 2018-09-06 RX ORDER — YOHIMBE BARK 500 MG
CAPSULE ORAL
COMMUNITY
Start: 2006-05-03

## 2018-09-06 RX ORDER — AMOXICILLIN 500 MG/1
TABLET, FILM COATED ORAL
Qty: 10 TABLET | Refills: 0 | Status: SHIPPED | OUTPATIENT
Start: 2018-09-06 | End: 2019-01-16 | Stop reason: ALTCHOICE

## 2018-09-06 ASSESSMENT — ENCOUNTER SYMPTOMS
BLOATING: 0
VOMITING: 0
EYE PAIN: 0
HEMATOCHEZIA: 0
RECTAL PAIN: 0
COLOR CHANGE: 0
BLURRED VISION: 0
EYE REDNESS: 0
BACK PAIN: 1
FLATUS: 1
CONSTIPATION: 1
ABDOMINAL PAIN: 1
FACIAL SWELLING: 0
CHEST TIGHTNESS: 0
PHOTOPHOBIA: 0
APNEA: 0
WHEEZING: 0
SHORTNESS OF BREATH: 0
EYE DISCHARGE: 0
ORTHOPNEA: 0
STRIDOR: 0
DIARRHEA: 0
CHOKING: 0
NAUSEA: 0

## 2018-09-06 NOTE — PROGRESS NOTES
Subjective:      Patient ID: Bettye Valencia is a 77 y.o. female who presents today for:  Chief Complaint   Patient presents with    Constipation     Pt presents stating she had a bowel blockage and took milk of magnesia which gave her mild relief, but after using the restroom she was still having gas. Has been ongoing for several days, Will get back pain and abdominal pain with it. Constipation   This is a new problem. The current episode started 1 to 4 weeks ago. The problem has been waxing and waning since onset. Associated symptoms include abdominal pain, back pain and flatus. Pertinent negatives include no anorexia, bloating, diarrhea, difficulty urinating, fecal incontinence, fever, hematochezia, hemorrhoids, melena, nausea, rectal pain, vomiting or weight loss. Treatments tried: Milk of Magnesia. The treatment provided mild relief. Hypertension   This is a chronic problem. The current episode started more than 1 year ago. The problem is unchanged. The problem is controlled. Pertinent negatives include no anxiety, blurred vision, chest pain, headaches, malaise/fatigue, neck pain, orthopnea, palpitations, peripheral edema, PND, shortness of breath or sweats. There are no associated agents to hypertension. Risk factors for coronary artery disease include post-menopausal state and diabetes mellitus. Treatments tried: Losartan. The current treatment provides moderate improvement. There are no compliance problems. Diabetes   She presents for her follow-up diabetic visit. She has type 2 diabetes mellitus. Her disease course has been stable. There are no hypoglycemic associated symptoms. Pertinent negatives for hypoglycemia include no confusion, dizziness, headaches, nervousness/anxiousness, pallor, speech difficulty, sweats or tremors. There are no diabetic associated symptoms. Pertinent negatives for diabetes include no blurred vision, no chest pain, no polydipsia, no polyphagia and no weight loss. There are no hypoglycemic complications. Symptoms are stable. There are no diabetic complications. Risk factors for coronary artery disease include diabetes mellitus, dyslipidemia, hypertension and post-menopausal. Current diabetic treatments: Glipizide, Actos. She is compliant with treatment all of the time. Her weight is stable. She is following a generally healthy diet. Eye exam is current. Patient is taking Mucinex nightly for allergies. Fu anx, [place]  Chronic, stable    Past Medical History:   Diagnosis Date    Allergic rhinitis     Chronic sinusitis     Depression     Hx of blood clots     leg    Hyperlipidemia     Hypertension     Menopause     Thyroid disease     Tobacco abuse     Type II or unspecified type diabetes mellitus without mention of complication, not stated as uncontrolled      Past Surgical History:   Procedure Laterality Date    CERVIX SURGERY      CONGATION OF CERVIX    EYE SURGERY Bilateral     lasik    FINGER TRIGGER RELEASE  10/18/2016    HYSTERECTOMY      NASAL SEPTUM SURGERY  11/1999    [place] COLON CA SCRN NOT  [address]  IND N/A 7/21/2017    COLONOSCOPY performed by Dilma Ramirez MD at [place] [address] ENDOSCOPY  08/23/2012     Family History   Problem Relation Age of Onset    Diabetes Mother     High Blood Pressure Sister      Social History     Social History    Marital status:      Spouse name: N/A    Number of children: N/A    Years of education: N/A     Occupational History    Not on file.      Social History Main Topics    Smoking status: Former Smoker     Packs/day: 1.50     Years: 20.00     Quit date: 1/1/1996    Smokeless tobacco: Never Used    Alcohol use 1.0 oz/week     2 Standard drinks or equivalent per week    Drug use: No    Sexual activity: Not Currently     Other Topics Concern    Not on file     Social History Narrative    No narrative on file     Allergies: services described in this documentation, as scribed by RENEE Pyle   in my presence, and it is both accurate and complete.  Electronically signed by: Juno Moyer MD    9/10/18 5:09 AM    Juno Moyer MD

## 2018-09-10 RX ORDER — AMOXICILLIN 500 MG/1
TABLET, FILM COATED ORAL
Qty: 30 TABLET | Refills: 0 | Status: SHIPPED | OUTPATIENT
Start: 2018-09-10 | End: 2018-10-26 | Stop reason: SDUPTHER

## 2018-10-26 ENCOUNTER — OFFICE VISIT (OUTPATIENT)
Dept: PRIMARY CARE CLINIC | Age: 68
End: 2018-10-26
Payer: MEDICARE

## 2018-10-26 VITALS
HEART RATE: 75 BPM | SYSTOLIC BLOOD PRESSURE: 120 MMHG | WEIGHT: 146 LBS | OXYGEN SATURATION: 95 % | HEIGHT: 66 IN | TEMPERATURE: 97.7 F | DIASTOLIC BLOOD PRESSURE: 70 MMHG | RESPIRATION RATE: 14 BRPM | BODY MASS INDEX: 23.46 KG/M2

## 2018-10-26 DIAGNOSIS — R63.1 POLYDIPSIA: ICD-10-CM

## 2018-10-26 DIAGNOSIS — E78.2 MIXED HYPERLIPIDEMIA: ICD-10-CM

## 2018-10-26 DIAGNOSIS — J30.1 ALLERGIC RHINITIS DUE TO POLLEN, UNSPECIFIED SEASONALITY: ICD-10-CM

## 2018-10-26 DIAGNOSIS — J40 BRONCHITIS: ICD-10-CM

## 2018-10-26 DIAGNOSIS — F32.9 REACTIVE DEPRESSION: ICD-10-CM

## 2018-10-26 DIAGNOSIS — F02.83 DEMENTIA OF THE ALZHEIMER'S TYPE, WITH EARLY ONSET, WITH DEPRESSIVE MOOD (HCC): ICD-10-CM

## 2018-10-26 DIAGNOSIS — M15.9 PRIMARY OSTEOARTHRITIS INVOLVING MULTIPLE JOINTS: ICD-10-CM

## 2018-10-26 DIAGNOSIS — K21.9 GASTROESOPHAGEAL REFLUX DISEASE WITHOUT ESOPHAGITIS: ICD-10-CM

## 2018-10-26 DIAGNOSIS — Z13.31 POSITIVE DEPRESSION SCREENING: ICD-10-CM

## 2018-10-26 DIAGNOSIS — I10 HTN (HYPERTENSION), BENIGN: ICD-10-CM

## 2018-10-26 DIAGNOSIS — G30.0 DEMENTIA OF THE ALZHEIMER'S TYPE, WITH EARLY ONSET, WITH DEPRESSIVE MOOD (HCC): ICD-10-CM

## 2018-10-26 DIAGNOSIS — R35.89 POLYURIA: ICD-10-CM

## 2018-10-26 DIAGNOSIS — Z91.81 AT HIGH RISK FOR FALLS: ICD-10-CM

## 2018-10-26 DIAGNOSIS — E11.69 DM TYPE 2 WITH DIABETIC DYSLIPIDEMIA (HCC): Primary | ICD-10-CM

## 2018-10-26 DIAGNOSIS — E78.5 DM TYPE 2 WITH DIABETIC DYSLIPIDEMIA (HCC): Primary | ICD-10-CM

## 2018-10-26 DIAGNOSIS — B00.9 HSV-1 INFECTION: ICD-10-CM

## 2018-10-26 LAB
GLUCOSE BLD-MCNC: 123 MG/DL
HBA1C MFR BLD: 5.9 %

## 2018-10-26 PROCEDURE — G8420 CALC BMI NORM PARAMETERS: HCPCS | Performed by: FAMILY MEDICINE

## 2018-10-26 PROCEDURE — 99214 OFFICE O/P EST MOD 30 MIN: CPT | Performed by: FAMILY MEDICINE

## 2018-10-26 PROCEDURE — 1090F PRES/ABSN URINE INCON ASSESS: CPT | Performed by: FAMILY MEDICINE

## 2018-10-26 PROCEDURE — 4040F PNEUMOC VAC/ADMIN/RCVD: CPT | Performed by: FAMILY MEDICINE

## 2018-10-26 PROCEDURE — 83036 HEMOGLOBIN GLYCOSYLATED A1C: CPT | Performed by: FAMILY MEDICINE

## 2018-10-26 PROCEDURE — 1101F PT FALLS ASSESS-DOCD LE1/YR: CPT | Performed by: FAMILY MEDICINE

## 2018-10-26 PROCEDURE — G8427 DOCREV CUR MEDS BY ELIG CLIN: HCPCS | Performed by: FAMILY MEDICINE

## 2018-10-26 PROCEDURE — 3017F COLORECTAL CA SCREEN DOC REV: CPT | Performed by: FAMILY MEDICINE

## 2018-10-26 PROCEDURE — 3044F HG A1C LEVEL LT 7.0%: CPT | Performed by: FAMILY MEDICINE

## 2018-10-26 PROCEDURE — G8399 PT W/DXA RESULTS DOCUMENT: HCPCS | Performed by: FAMILY MEDICINE

## 2018-10-26 PROCEDURE — G8482 FLU IMMUNIZE ORDER/ADMIN: HCPCS | Performed by: FAMILY MEDICINE

## 2018-10-26 PROCEDURE — 1036F TOBACCO NON-USER: CPT | Performed by: FAMILY MEDICINE

## 2018-10-26 PROCEDURE — 1123F ACP DISCUSS/DSCN MKR DOCD: CPT | Performed by: FAMILY MEDICINE

## 2018-10-26 PROCEDURE — G8431 POS CLIN DEPRES SCRN F/U DOC: HCPCS | Performed by: FAMILY MEDICINE

## 2018-10-26 PROCEDURE — 82962 GLUCOSE BLOOD TEST: CPT | Performed by: FAMILY MEDICINE

## 2018-10-26 PROCEDURE — 2022F DILAT RTA XM EVC RTNOPTHY: CPT | Performed by: FAMILY MEDICINE

## 2018-10-26 RX ORDER — GLIPIZIDE 5 MG/1
5 TABLET, FILM COATED, EXTENDED RELEASE ORAL 2 TIMES DAILY
Qty: 180 TABLET | Refills: 3 | Status: SHIPPED | OUTPATIENT
Start: 2018-10-26 | End: 2019-03-07 | Stop reason: SDUPTHER

## 2018-10-26 RX ORDER — DONEPEZIL HYDROCHLORIDE 10 MG/1
10 TABLET, FILM COATED ORAL 2 TIMES DAILY
Qty: 180 TABLET | Refills: 3 | Status: SHIPPED | OUTPATIENT
Start: 2018-10-26 | End: 2019-03-07 | Stop reason: SDUPTHER

## 2018-10-26 RX ORDER — BUPROPION HYDROCHLORIDE 150 MG/1
TABLET, EXTENDED RELEASE ORAL
Qty: 180 TABLET | Refills: 3 | Status: SHIPPED | OUTPATIENT
Start: 2018-10-26 | End: 2019-03-07

## 2018-10-26 RX ORDER — FEXOFENADINE HCL 180 MG/1
180 TABLET ORAL DAILY
Qty: 90 TABLET | Refills: 3 | Status: SHIPPED | OUTPATIENT
Start: 2018-10-26 | End: 2019-03-07 | Stop reason: SDUPTHER

## 2018-10-26 RX ORDER — RANITIDINE 150 MG/1
150 TABLET ORAL 2 TIMES DAILY
Qty: 180 TABLET | Refills: 3 | Status: SHIPPED | OUTPATIENT
Start: 2018-10-26 | End: 2019-03-07 | Stop reason: SDUPTHER

## 2018-10-26 RX ORDER — AMOXICILLIN 500 MG/1
TABLET, FILM COATED ORAL
Qty: 10 TABLET | Refills: 0 | Status: CANCELLED | OUTPATIENT
Start: 2018-10-26

## 2018-10-26 RX ORDER — SIMVASTATIN 40 MG
TABLET ORAL
Qty: 90 TABLET | Refills: 3 | Status: SHIPPED | OUTPATIENT
Start: 2018-10-26 | End: 2019-03-07 | Stop reason: SDUPTHER

## 2018-10-26 RX ORDER — DICLOFENAC SODIUM 75 MG/1
TABLET, DELAYED RELEASE ORAL
Qty: 180 TABLET | Refills: 3 | Status: SHIPPED | OUTPATIENT
Start: 2018-10-26 | End: 2019-03-07 | Stop reason: SDUPTHER

## 2018-10-26 RX ORDER — AMOXICILLIN 500 MG/1
TABLET, FILM COATED ORAL
Qty: 30 TABLET | Refills: 0 | Status: SHIPPED | OUTPATIENT
Start: 2018-10-26

## 2018-10-26 RX ORDER — PIOGLITAZONEHYDROCHLORIDE 30 MG/1
TABLET ORAL
Qty: 90 TABLET | Refills: 3 | Status: SHIPPED | OUTPATIENT
Start: 2018-10-26 | End: 2019-03-07 | Stop reason: SDUPTHER

## 2018-10-26 RX ORDER — ACYCLOVIR 400 MG/1
TABLET ORAL
Qty: 90 TABLET | Refills: 3 | Status: SHIPPED | OUTPATIENT
Start: 2018-10-26

## 2018-10-26 RX ORDER — LOSARTAN POTASSIUM 50 MG/1
TABLET ORAL
Qty: 90 TABLET | Refills: 3 | Status: SHIPPED | OUTPATIENT
Start: 2018-10-26 | End: 2019-03-07 | Stop reason: SDUPTHER

## 2018-10-26 RX ORDER — CITALOPRAM 20 MG/1
20 TABLET ORAL DAILY
Qty: 90 TABLET | Refills: 1 | Status: SHIPPED | OUTPATIENT
Start: 2018-10-26

## 2018-10-26 RX ORDER — MEMANTINE HYDROCHLORIDE 10 MG/1
10 TABLET ORAL 2 TIMES DAILY
Qty: 180 TABLET | Refills: 3 | Status: SHIPPED | OUTPATIENT
Start: 2018-10-26 | End: 2019-03-07 | Stop reason: SDUPTHER

## 2018-10-26 ASSESSMENT — ENCOUNTER SYMPTOMS
ABDOMINAL PAIN: 0
EYE ITCHING: 0
EYE REDNESS: 0
SHORTNESS OF BREATH: 0
ORTHOPNEA: 0
BLURRED VISION: 0
BACK PAIN: 0
GASTROINTESTINAL NEGATIVE: 1
DIARRHEA: 0
WHEEZING: 0
VISUAL CHANGE: 0
CONSTIPATION: 0
PHOTOPHOBIA: 0
EYES NEGATIVE: 1
RESPIRATORY NEGATIVE: 1

## 2018-12-14 ENCOUNTER — TELEPHONE (OUTPATIENT)
Dept: PRIMARY CARE CLINIC | Age: 68
End: 2018-12-14

## 2018-12-14 RX ORDER — FLUCONAZOLE 100 MG/1
100 TABLET ORAL DAILY
Qty: 30 TABLET | Refills: 0 | Status: SHIPPED | OUTPATIENT
Start: 2018-12-14

## 2018-12-14 NOTE — TELEPHONE ENCOUNTER
Pt requesting rx of diflucan for yeast infections. She is asking for an rx that can be taken every day, or one with many refills to have on hand. States she gets frequent yeast infections.

## 2019-01-15 ENCOUNTER — TELEPHONE (OUTPATIENT)
Dept: PRIMARY CARE CLINIC | Age: 69
End: 2019-01-15

## 2019-01-16 ENCOUNTER — OFFICE VISIT (OUTPATIENT)
Dept: PRIMARY CARE CLINIC | Age: 69
End: 2019-01-16
Payer: MEDICARE

## 2019-01-16 VITALS
OXYGEN SATURATION: 97 % | WEIGHT: 146.3 LBS | BODY MASS INDEX: 23.51 KG/M2 | HEIGHT: 66 IN | SYSTOLIC BLOOD PRESSURE: 128 MMHG | RESPIRATION RATE: 14 BRPM | HEART RATE: 95 BPM | TEMPERATURE: 98.4 F | DIASTOLIC BLOOD PRESSURE: 76 MMHG

## 2019-01-16 DIAGNOSIS — F32.0 MILD MAJOR DEPRESSION (HCC): ICD-10-CM

## 2019-01-16 DIAGNOSIS — K52.9 AGE (ACUTE GASTROENTERITIS): Primary | ICD-10-CM

## 2019-01-16 DIAGNOSIS — F02.83 DEMENTIA OF THE ALZHEIMER'S TYPE, WITH EARLY ONSET, WITH DEPRESSIVE MOOD (HCC): ICD-10-CM

## 2019-01-16 DIAGNOSIS — R11.0 NAUSEA: ICD-10-CM

## 2019-01-16 DIAGNOSIS — E11.69 DM TYPE 2 WITH DIABETIC DYSLIPIDEMIA (HCC): ICD-10-CM

## 2019-01-16 DIAGNOSIS — G30.0 DEMENTIA OF THE ALZHEIMER'S TYPE, WITH EARLY ONSET, WITH DEPRESSIVE MOOD (HCC): ICD-10-CM

## 2019-01-16 DIAGNOSIS — E78.5 DM TYPE 2 WITH DIABETIC DYSLIPIDEMIA (HCC): ICD-10-CM

## 2019-01-16 PROCEDURE — G8482 FLU IMMUNIZE ORDER/ADMIN: HCPCS | Performed by: FAMILY MEDICINE

## 2019-01-16 PROCEDURE — 3046F HEMOGLOBIN A1C LEVEL >9.0%: CPT | Performed by: FAMILY MEDICINE

## 2019-01-16 PROCEDURE — G8427 DOCREV CUR MEDS BY ELIG CLIN: HCPCS | Performed by: FAMILY MEDICINE

## 2019-01-16 PROCEDURE — 2022F DILAT RTA XM EVC RTNOPTHY: CPT | Performed by: FAMILY MEDICINE

## 2019-01-16 PROCEDURE — G8420 CALC BMI NORM PARAMETERS: HCPCS | Performed by: FAMILY MEDICINE

## 2019-01-16 PROCEDURE — 3017F COLORECTAL CA SCREEN DOC REV: CPT | Performed by: FAMILY MEDICINE

## 2019-01-16 PROCEDURE — 99214 OFFICE O/P EST MOD 30 MIN: CPT | Performed by: FAMILY MEDICINE

## 2019-01-16 PROCEDURE — G8399 PT W/DXA RESULTS DOCUMENT: HCPCS | Performed by: FAMILY MEDICINE

## 2019-01-16 PROCEDURE — 1123F ACP DISCUSS/DSCN MKR DOCD: CPT | Performed by: FAMILY MEDICINE

## 2019-01-16 PROCEDURE — 1090F PRES/ABSN URINE INCON ASSESS: CPT | Performed by: FAMILY MEDICINE

## 2019-01-16 PROCEDURE — 1036F TOBACCO NON-USER: CPT | Performed by: FAMILY MEDICINE

## 2019-01-16 PROCEDURE — 1101F PT FALLS ASSESS-DOCD LE1/YR: CPT | Performed by: FAMILY MEDICINE

## 2019-01-16 PROCEDURE — 4040F PNEUMOC VAC/ADMIN/RCVD: CPT | Performed by: FAMILY MEDICINE

## 2019-01-16 RX ORDER — ONDANSETRON 4 MG/1
4 TABLET, FILM COATED ORAL EVERY 4 HOURS PRN
Qty: 30 TABLET | Refills: 1 | Status: SHIPPED | OUTPATIENT
Start: 2019-01-16

## 2019-01-16 RX ORDER — CIPROFLOXACIN 250 MG/1
250 TABLET, FILM COATED ORAL 2 TIMES DAILY
Qty: 10 TABLET | Refills: 0 | Status: SHIPPED | OUTPATIENT
Start: 2019-01-16 | End: 2019-01-21

## 2019-01-16 ASSESSMENT — ENCOUNTER SYMPTOMS
FACIAL SWELLING: 0
COLOR CHANGE: 0
NAUSEA: 1
EYE PAIN: 0
STRIDOR: 0
WHEEZING: 0
ABDOMINAL PAIN: 1
VISUAL CHANGE: 0
COUGH: 0
SWOLLEN GLANDS: 0
ANAL BLEEDING: 1
APNEA: 0
CHOKING: 0
CHANGE IN BOWEL HABIT: 1
CONSTIPATION: 0
CHEST TIGHTNESS: 0
EYE DISCHARGE: 0
DIARRHEA: 1
VOMITING: 1
EYE REDNESS: 0
PHOTOPHOBIA: 0
SORE THROAT: 0

## 2019-01-18 PROBLEM — F32.0 MILD MAJOR DEPRESSION (HCC): Status: ACTIVE | Noted: 2019-01-18

## 2019-01-27 DIAGNOSIS — J01.01 ACUTE RECURRENT MAXILLARY SINUSITIS: ICD-10-CM

## 2019-01-28 RX ORDER — FLUTICASONE PROPIONATE 50 MCG
SPRAY, SUSPENSION (ML) NASAL
Qty: 48 G | Refills: 3 | Status: SHIPPED | OUTPATIENT
Start: 2019-01-28

## 2019-03-06 ENCOUNTER — CARE COORDINATION (OUTPATIENT)
Dept: CARE COORDINATION | Age: 69
End: 2019-03-06

## 2019-03-06 ENCOUNTER — TELEPHONE (OUTPATIENT)
Dept: PRIMARY CARE CLINIC | Age: 69
End: 2019-03-06

## 2019-03-07 ENCOUNTER — TELEPHONE (OUTPATIENT)
Dept: PRIMARY CARE CLINIC | Age: 69
End: 2019-03-07

## 2019-03-07 ENCOUNTER — OFFICE VISIT (OUTPATIENT)
Dept: PRIMARY CARE CLINIC | Age: 69
End: 2019-03-07
Payer: MEDICARE

## 2019-03-07 VITALS
SYSTOLIC BLOOD PRESSURE: 118 MMHG | OXYGEN SATURATION: 96 % | BODY MASS INDEX: 24.59 KG/M2 | HEIGHT: 66 IN | WEIGHT: 153 LBS | HEART RATE: 82 BPM | DIASTOLIC BLOOD PRESSURE: 60 MMHG | TEMPERATURE: 97.8 F | RESPIRATION RATE: 14 BRPM

## 2019-03-07 DIAGNOSIS — J30.1 ALLERGIC RHINITIS DUE TO POLLEN, UNSPECIFIED SEASONALITY: ICD-10-CM

## 2019-03-07 DIAGNOSIS — E78.2 MIXED HYPERLIPIDEMIA: ICD-10-CM

## 2019-03-07 DIAGNOSIS — E11.69 DM TYPE 2 WITH DIABETIC DYSLIPIDEMIA (HCC): Primary | ICD-10-CM

## 2019-03-07 DIAGNOSIS — B35.1 ONYCHOMYCOSIS: ICD-10-CM

## 2019-03-07 DIAGNOSIS — M15.9 PRIMARY OSTEOARTHRITIS INVOLVING MULTIPLE JOINTS: ICD-10-CM

## 2019-03-07 DIAGNOSIS — G30.0 DEMENTIA OF THE ALZHEIMER'S TYPE, WITH EARLY ONSET, WITH DEPRESSIVE MOOD (HCC): ICD-10-CM

## 2019-03-07 DIAGNOSIS — K21.9 GASTROESOPHAGEAL REFLUX DISEASE WITHOUT ESOPHAGITIS: ICD-10-CM

## 2019-03-07 DIAGNOSIS — F32.9 REACTIVE DEPRESSION: ICD-10-CM

## 2019-03-07 DIAGNOSIS — F02.83 DEMENTIA OF THE ALZHEIMER'S TYPE, WITH EARLY ONSET, WITH DEPRESSIVE MOOD (HCC): ICD-10-CM

## 2019-03-07 DIAGNOSIS — E78.5 DM TYPE 2 WITH DIABETIC DYSLIPIDEMIA (HCC): Primary | ICD-10-CM

## 2019-03-07 DIAGNOSIS — I10 HTN (HYPERTENSION), BENIGN: ICD-10-CM

## 2019-03-07 LAB
GLUCOSE BLD-MCNC: 211 MG/DL
HBA1C MFR BLD: 6.4 %

## 2019-03-07 PROCEDURE — 99214 OFFICE O/P EST MOD 30 MIN: CPT | Performed by: FAMILY MEDICINE

## 2019-03-07 PROCEDURE — 3044F HG A1C LEVEL LT 7.0%: CPT | Performed by: FAMILY MEDICINE

## 2019-03-07 PROCEDURE — 82962 GLUCOSE BLOOD TEST: CPT | Performed by: FAMILY MEDICINE

## 2019-03-07 PROCEDURE — G8419 CALC BMI OUT NRM PARAM NOF/U: HCPCS | Performed by: FAMILY MEDICINE

## 2019-03-07 PROCEDURE — G8482 FLU IMMUNIZE ORDER/ADMIN: HCPCS | Performed by: FAMILY MEDICINE

## 2019-03-07 PROCEDURE — 2022F DILAT RTA XM EVC RTNOPTHY: CPT | Performed by: FAMILY MEDICINE

## 2019-03-07 PROCEDURE — 1036F TOBACCO NON-USER: CPT | Performed by: FAMILY MEDICINE

## 2019-03-07 PROCEDURE — 1101F PT FALLS ASSESS-DOCD LE1/YR: CPT | Performed by: FAMILY MEDICINE

## 2019-03-07 PROCEDURE — 3017F COLORECTAL CA SCREEN DOC REV: CPT | Performed by: FAMILY MEDICINE

## 2019-03-07 PROCEDURE — G8427 DOCREV CUR MEDS BY ELIG CLIN: HCPCS | Performed by: FAMILY MEDICINE

## 2019-03-07 PROCEDURE — G8399 PT W/DXA RESULTS DOCUMENT: HCPCS | Performed by: FAMILY MEDICINE

## 2019-03-07 PROCEDURE — 1123F ACP DISCUSS/DSCN MKR DOCD: CPT | Performed by: FAMILY MEDICINE

## 2019-03-07 PROCEDURE — 83036 HEMOGLOBIN GLYCOSYLATED A1C: CPT | Performed by: FAMILY MEDICINE

## 2019-03-07 PROCEDURE — 1090F PRES/ABSN URINE INCON ASSESS: CPT | Performed by: FAMILY MEDICINE

## 2019-03-07 PROCEDURE — 4040F PNEUMOC VAC/ADMIN/RCVD: CPT | Performed by: FAMILY MEDICINE

## 2019-03-07 RX ORDER — PIOGLITAZONEHYDROCHLORIDE 30 MG/1
TABLET ORAL
Qty: 90 TABLET | Refills: 3 | Status: SHIPPED | OUTPATIENT
Start: 2019-03-07

## 2019-03-07 RX ORDER — DONEPEZIL HYDROCHLORIDE 10 MG/1
10 TABLET, FILM COATED ORAL 2 TIMES DAILY
Qty: 180 TABLET | Refills: 3 | Status: SHIPPED | OUTPATIENT
Start: 2019-03-07 | End: 2020-11-06 | Stop reason: SDUPTHER

## 2019-03-07 RX ORDER — FEXOFENADINE HCL 180 MG/1
180 TABLET ORAL DAILY
Qty: 90 TABLET | Refills: 3 | Status: SHIPPED | OUTPATIENT
Start: 2019-03-07

## 2019-03-07 RX ORDER — BUPROPION HYDROCHLORIDE 150 MG/1
TABLET, EXTENDED RELEASE ORAL
Qty: 180 TABLET | Refills: 3 | Status: CANCELLED | OUTPATIENT
Start: 2019-03-07

## 2019-03-07 RX ORDER — RANITIDINE 150 MG/1
150 TABLET ORAL 2 TIMES DAILY
Qty: 180 TABLET | Refills: 3 | Status: SHIPPED | OUTPATIENT
Start: 2019-03-07

## 2019-03-07 RX ORDER — GLIPIZIDE 5 MG/1
5 TABLET, FILM COATED, EXTENDED RELEASE ORAL DAILY
Qty: 90 TABLET | Refills: 2 | Status: SHIPPED | OUTPATIENT
Start: 2019-03-07

## 2019-03-07 RX ORDER — LOSARTAN POTASSIUM 50 MG/1
TABLET ORAL
Qty: 90 TABLET | Refills: 3 | Status: SHIPPED | OUTPATIENT
Start: 2019-03-07

## 2019-03-07 RX ORDER — SIMVASTATIN 40 MG
TABLET ORAL
Qty: 90 TABLET | Refills: 3 | Status: SHIPPED | OUTPATIENT
Start: 2019-03-07

## 2019-03-07 RX ORDER — DICLOFENAC SODIUM 75 MG/1
TABLET, DELAYED RELEASE ORAL
Qty: 180 TABLET | Refills: 3 | Status: SHIPPED | OUTPATIENT
Start: 2019-03-07

## 2019-03-07 RX ORDER — MEMANTINE HYDROCHLORIDE 10 MG/1
10 TABLET ORAL 2 TIMES DAILY
Qty: 180 TABLET | Refills: 3 | Status: SHIPPED | OUTPATIENT
Start: 2019-03-07 | End: 2020-11-06 | Stop reason: SDUPTHER

## 2019-03-07 RX ORDER — GLIPIZIDE 5 MG/1
5 TABLET, FILM COATED, EXTENDED RELEASE ORAL 2 TIMES DAILY
Qty: 180 TABLET | Refills: 3 | Status: SHIPPED | OUTPATIENT
Start: 2019-03-07 | End: 2019-03-07 | Stop reason: DRUGHIGH

## 2019-03-07 RX ORDER — ERYTHROMYCIN 5 MG/G
OINTMENT OPHTHALMIC
Qty: 1 TUBE | Refills: 6 | Status: SHIPPED | OUTPATIENT
Start: 2019-03-07

## 2019-03-07 RX ORDER — FLUCONAZOLE 100 MG/1
TABLET ORAL
Qty: 15 TABLET | Refills: 0 | Status: SHIPPED | OUTPATIENT
Start: 2019-03-07 | End: 2019-03-14 | Stop reason: SDUPTHER

## 2019-03-07 ASSESSMENT — ENCOUNTER SYMPTOMS
GASTROINTESTINAL NEGATIVE: 1
EYE REDNESS: 0
CONSTIPATION: 0
PHOTOPHOBIA: 0
WHEEZING: 0
RESPIRATORY NEGATIVE: 1
ABDOMINAL PAIN: 0
EYE ITCHING: 0
DIARRHEA: 0
EYES NEGATIVE: 1
BACK PAIN: 0
SHORTNESS OF BREATH: 0

## 2019-03-28 ENCOUNTER — CARE COORDINATION (OUTPATIENT)
Dept: CARE COORDINATION | Age: 69
End: 2019-03-28

## 2019-07-03 LAB — DIABETIC RETINOPATHY: NEGATIVE

## 2019-11-01 ENCOUNTER — OFFICE VISIT (OUTPATIENT)
Dept: NEUROLOGY | Age: 69
End: 2019-11-01
Payer: MEDICARE

## 2019-11-01 VITALS
BODY MASS INDEX: 24.43 KG/M2 | SYSTOLIC BLOOD PRESSURE: 134 MMHG | DIASTOLIC BLOOD PRESSURE: 68 MMHG | HEART RATE: 93 BPM | WEIGHT: 152 LBS | HEIGHT: 66 IN

## 2019-11-01 DIAGNOSIS — G30.0 DEMENTIA OF THE ALZHEIMER'S TYPE, WITH EARLY ONSET, WITH DEPRESSIVE MOOD (HCC): Primary | ICD-10-CM

## 2019-11-01 DIAGNOSIS — F02.83 DEMENTIA OF THE ALZHEIMER'S TYPE, WITH EARLY ONSET, WITH DEPRESSIVE MOOD (HCC): Primary | ICD-10-CM

## 2019-11-01 PROCEDURE — 99214 OFFICE O/P EST MOD 30 MIN: CPT | Performed by: PSYCHIATRY & NEUROLOGY

## 2019-11-01 RX ORDER — LISINOPRIL 20 MG/1
TABLET ORAL
COMMUNITY
Start: 2010-11-29 | End: 2019-11-01

## 2019-11-01 ASSESSMENT — ENCOUNTER SYMPTOMS
NAUSEA: 0
BACK PAIN: 0
PHOTOPHOBIA: 0
VOMITING: 0
CHOKING: 0
TROUBLE SWALLOWING: 0
SHORTNESS OF BREATH: 0

## 2020-11-04 PROBLEM — E78.5 HYPERLIPIDEMIA: Status: ACTIVE | Noted: 2020-11-04

## 2020-11-04 PROBLEM — Z91.09 ENVIRONMENTAL ALLERGIES: Status: ACTIVE | Noted: 2020-11-04

## 2020-11-04 PROBLEM — F02.80 PRIMARY DEGENERATIVE DEMENTIA OF THE ALZHEIMER TYPE, PRESENILE ONSET, UNCOMPLICATED (HCC): Status: ACTIVE | Noted: 2017-04-02

## 2020-11-04 PROBLEM — B49 MYCOSIS: Status: ACTIVE | Noted: 2020-11-04

## 2020-11-04 PROBLEM — B00.1 HERPES LABIALIS: Status: ACTIVE | Noted: 2020-11-04

## 2020-11-04 PROBLEM — Z86.59 H/O: DEPRESSION: Status: ACTIVE | Noted: 2020-11-04

## 2020-11-04 PROBLEM — R05.9 COUGH: Status: ACTIVE | Noted: 2020-11-04

## 2020-11-04 PROBLEM — M19.049 OSTEOARTHRITIS OF HAND: Status: ACTIVE | Noted: 2020-11-04

## 2020-11-04 PROBLEM — M17.11 UNILATERAL PRIMARY OSTEOARTHRITIS, RIGHT KNEE: Status: ACTIVE | Noted: 2018-02-28

## 2020-11-04 PROBLEM — R51.9 TEMPORAL HEADACHE: Status: ACTIVE | Noted: 2020-11-04

## 2020-11-04 PROBLEM — R68.89 ACTIVITY INTOLERANCE: Status: ACTIVE | Noted: 2020-11-04

## 2020-11-04 PROBLEM — I65.29 CAROTID ARTERY STENOSIS: Status: ACTIVE | Noted: 2020-11-04

## 2020-11-04 PROBLEM — M79.673 FOOT PAIN: Status: ACTIVE | Noted: 2020-11-04

## 2020-11-04 PROBLEM — D51.0 PERNICIOUS ANEMIA: Status: ACTIVE | Noted: 2020-11-04

## 2020-11-04 PROBLEM — M79.89 SWELLING OF LOWER LIMB: Status: ACTIVE | Noted: 2020-11-04

## 2020-11-04 PROBLEM — J01.91 RECURRENT ACUTE SINUSITIS: Status: ACTIVE | Noted: 2020-11-04

## 2020-11-06 ENCOUNTER — OFFICE VISIT (OUTPATIENT)
Dept: NEUROLOGY | Age: 70
End: 2020-11-06
Payer: MEDICARE

## 2020-11-06 VITALS
WEIGHT: 153 LBS | BODY MASS INDEX: 25.07 KG/M2 | SYSTOLIC BLOOD PRESSURE: 126 MMHG | HEART RATE: 87 BPM | DIASTOLIC BLOOD PRESSURE: 60 MMHG

## 2020-11-06 PROBLEM — R25.1 TREMORS OF NERVOUS SYSTEM: Status: ACTIVE | Noted: 2020-11-06

## 2020-11-06 PROBLEM — G30.0: Status: ACTIVE | Noted: 2020-11-06

## 2020-11-06 PROBLEM — F02.83: Status: ACTIVE | Noted: 2020-11-06

## 2020-11-06 PROCEDURE — 4040F PNEUMOC VAC/ADMIN/RCVD: CPT | Performed by: PSYCHIATRY & NEUROLOGY

## 2020-11-06 PROCEDURE — 1036F TOBACCO NON-USER: CPT | Performed by: PSYCHIATRY & NEUROLOGY

## 2020-11-06 PROCEDURE — 1090F PRES/ABSN URINE INCON ASSESS: CPT | Performed by: PSYCHIATRY & NEUROLOGY

## 2020-11-06 PROCEDURE — 3017F COLORECTAL CA SCREEN DOC REV: CPT | Performed by: PSYCHIATRY & NEUROLOGY

## 2020-11-06 PROCEDURE — G8417 CALC BMI ABV UP PARAM F/U: HCPCS | Performed by: PSYCHIATRY & NEUROLOGY

## 2020-11-06 PROCEDURE — G8399 PT W/DXA RESULTS DOCUMENT: HCPCS | Performed by: PSYCHIATRY & NEUROLOGY

## 2020-11-06 PROCEDURE — 99214 OFFICE O/P EST MOD 30 MIN: CPT | Performed by: PSYCHIATRY & NEUROLOGY

## 2020-11-06 PROCEDURE — G8427 DOCREV CUR MEDS BY ELIG CLIN: HCPCS | Performed by: PSYCHIATRY & NEUROLOGY

## 2020-11-06 PROCEDURE — G8484 FLU IMMUNIZE NO ADMIN: HCPCS | Performed by: PSYCHIATRY & NEUROLOGY

## 2020-11-06 PROCEDURE — 1123F ACP DISCUSS/DSCN MKR DOCD: CPT | Performed by: PSYCHIATRY & NEUROLOGY

## 2020-11-06 RX ORDER — MEMANTINE HYDROCHLORIDE 10 MG/1
10 TABLET ORAL 2 TIMES DAILY
Qty: 180 TABLET | Refills: 3 | Status: SHIPPED | OUTPATIENT
Start: 2020-11-06 | End: 2022-01-10

## 2020-11-06 RX ORDER — BUPROPION HYDROCHLORIDE 150 MG/1
TABLET, EXTENDED RELEASE ORAL
COMMUNITY
Start: 2019-12-18

## 2020-11-06 RX ORDER — CELECOXIB 200 MG/1
CAPSULE ORAL
COMMUNITY

## 2020-11-06 RX ORDER — DONEPEZIL HYDROCHLORIDE 10 MG/1
10 TABLET, FILM COATED ORAL 2 TIMES DAILY
Qty: 180 TABLET | Refills: 3 | Status: SHIPPED | OUTPATIENT
Start: 2020-11-06 | End: 2021-11-02

## 2020-11-06 ASSESSMENT — ENCOUNTER SYMPTOMS
CHOKING: 0
COLOR CHANGE: 0
NAUSEA: 0
BACK PAIN: 0
PHOTOPHOBIA: 0
SHORTNESS OF BREATH: 0
VOMITING: 0
TROUBLE SWALLOWING: 0

## 2020-11-06 NOTE — PROGRESS NOTES
Subjective:      Patient ID: Elvia Lagunas is a 65 y.o. female who presents today for:  Chief Complaint   Patient presents with    Follow-up     pt states that things are going okay.  states that memory is getting a little worse but she is doing okay. HPI 77-year-old right-handed female with history of Alzheimer's dementia as well as tremors and pseudobulbar affect. Patient has cognitive impairment and continues on [place] as well as receptive patient has a tremor which did not respond to carbidopa levodopa and this has not worsened. She is sleep deprivation and is on melatonin. Patient still is active drives manages herself is slowing down in her walking but no other parkinsonian features are notable.   Patient has risk factors for some vascular disease and her sugars are not very well controlled as she occasionally runs into 200s    Past Medical History:   Diagnosis Date    Allergic rhinitis     Chronic sinusitis     Dementia of the Alzheimer's type, with early onset, with depressive mood ([address] 4/2/2017    Depression     Hx of blood clots     leg    Hyperlipidemia     Hypertension     Menopause     Mild major depression ([address] 1/18/2019    Thyroid disease     Tobacco abuse     Type II or unspecified type diabetes mellitus without mention of complication, not stated as uncontrolled      Past Surgical History:   Procedure Laterality Date    CERVIX SURGERY      CONGATION OF CERVIX    EYE SURGERY Bilateral     lasik    FINGER TRIGGER RELEASE  10/18/2016    HYSTERECTOMY      NASAL SEPTUM SURGERY  11/1999    [place] COLON CA SCRN NOT  [place] [address] [place] [address] N/A 7/21/2017    COLONOSCOPY performed by Renay Redding MD at [facility]  08/23/2012     Social History     Socioeconomic History    Marital status:      Spouse name: Not on file    Number of children: Not on file    Years of education: Not on file    Highest education level: Not on file   Occupational History    Not on file   Social Needs    Financial resource strain: Not on file    Food insecurity     Worry: Not on file     Inability: Not on file    Transportation needs     Medical: Not on file     Non-medical: Not on file   Tobacco Use    Smoking status: Former Smoker     Packs/day: 1.50     Years: 20.00     Pack years: 30.00     Last attempt to quit: 1996     Years since quittin.8    Smokeless tobacco: Never Used   Substance and Sexual Activity    Alcohol use: Yes     Alcohol/week: 1.7 standard drinks     Types: 2 Standard drinks or equivalent per week    Drug use: No    Sexual activity: Not Currently   Lifestyle    Physical activity     Days per week: Not on file     Minutes per session: Not on file    Stress: Not on file   Relationships    Social connections     Talks on phone: Not on file     Gets together: Not on file     Attends Sikh service: Not on file     Active member of club or organization: Not on file     Attends meetings of clubs or organizations: Not on file     Relationship status: Not on file    Intimate partner violence     Fear of current or ex partner: Not on file     Emotionally abused: Not on file     Physically abused: Not on file     Forced sexual activity: Not on file   Other Topics Concern    Not on file   Social History Narrative    Not on file     Family History   Problem Relation Age of Onset    Diabetes Mother     High Blood Pressure Sister      Allergies   Allergen Reactions    Augmentin [Amoxicillin-Pot Clavulanate] Diarrhea     Patient states she can take regular Amoxicillin, just not Augmentin    Cephalexin      Other reaction(s):  Other: See Comments  Yeast infection    Ciprofloxacin Diarrhea    Darvocet [Propoxyphene N-Acetaminophen]     Effexor [Venlafaxine] Other (See Comments)    Ibuprofen     Macrobid [Nitrofurantoin Monohyd Macro] Other (See Comments)     Patient complained of severe headache and neck pain    Percocet [Oxycodone-Acetaminophen]     Sulfa Antibiotics     Zithromax [Azithromycin] Diarrhea    Vicodin [Hydrocodone-Acetaminophen] Nausea And Vomiting       Current Outpatient Medications   Medication Sig Dispense Refill    buPROPion (WELLBUTRIN SR) 150 MG extended release tablet Take by mouth      fluconazole (DIFLUCAN) 100 MG tablet TAKE ONE TABLET BY MOUTH EVERY DAY 30 tablet 0    pioglitazone (ACTOS) 30 MG tablet TAKE ONE TABLET BY MOUTH EVERY DAY 90 tablet 3    memantine (NAMENDA) 10 MG tablet Take 1 tablet by mouth 2 times daily 180 tablet 3    donepezil (ARICEPT) 10 MG tablet Take 1 tablet by mouth 2 times daily 180 tablet 3    simvastatin (ZOCOR) 40 MG tablet Take 1 tablet by mouth nightly 90 tablet 3    diclofenac (VOLTAREN) 75 MG EC tablet Take 1 tablet by mouth twice a day with meals 180 tablet 3    losartan (COZAAR) 50 MG tablet One daily 90 tablet 3    fexofenadine (ALLEGRA) 180 MG tablet Take 1 tablet by mouth daily 90 tablet 3    erythromycin (ROMYCIN) 5 MG/GM ophthalmic ointment apply a small amount into both eyes every night as needed 1 Tube 6    glipiZIDE (GLIPIZIDE XL) 5 MG extended release tablet Take 1 tablet by mouth daily 90 tablet 2    fluticasone (FLONASE) 50 MCG/ACT nasal spray USE 2 SPRAYS NASALLY DAILY 48 g 3    ondansetron (ZOFRAN) 4 MG tablet Take 1 tablet by mouth every 4 hours as needed for Nausea or Vomiting 30 tablet 1    fluconazole (DIFLUCAN) 100 MG tablet Take 1 tablet by mouth daily 30 tablet 0    Amoxicillin 500 MG TABS tid 30 tablet 0    citalopram (CELEXA) 20 MG tablet Take 1 tablet by mouth daily 90 tablet 1    acyclovir (ZOVIRAX) 400 MG tablet TAKE ONE TABLET BY MOUTH DAILY 90 tablet 3    Calcium Polycarbophil (FIBER) 625 MG TABS Take one tablet daily with 8 ounces of water 14 tablet 0    hydrOXYzine (ATARAX) 10 MG tablet TAKE ONE TABLET BY MOUTH DAILY AS NEEDED FOR ITCHING 90 tablet 1    Probiotic Product (ALIGN) 4 MG CAPS Take 1 capsule by mouth daily 90 capsule 3    Tuberculin-Allergy Syringes 28G X 1/2\" 1 ML MISC 1 each by Does not apply route daily as needed (as prescribed) 100 each 2    Calcium Citrate-Vitamin D (CALCIUM + D PO) Take by mouth      Fexofenadine HCl (ALLEGRA PO) Take by mouth      Multiple Vitamin (MULTIVITAMIN PO) Take  by mouth daily.  celecoxib (CELEBREX) 200 MG capsule Take by mouth      LISINOPRIL PO Take by mouth      ranitidine (ZANTAC) 150 MG tablet Take 1 tablet by mouth 2 times daily (Patient not taking: Reported on 11/6/2020) 180 tablet 3    Lactobacillus (ACIDOPHILUS) 100 MG CAPS Take by mouth      lidocaine (LIDODERM) 5 % Place 1 patch onto the skin daily 12 hours on, 12 hours off. (Patient not taking: Reported on 11/6/2020) 30 patch 0     No current facility-administered medications for this visit. Review of Systems   Constitutional: Negative for fever. HENT: Negative for ear pain, tinnitus and trouble swallowing. Eyes: Negative for photophobia and visual disturbance. Respiratory: Negative for choking and shortness of breath. Cardiovascular: Negative for chest pain and palpitations. Gastrointestinal: Negative for nausea and vomiting. Musculoskeletal: Negative for back pain, gait problem, joint swelling, myalgias, neck pain and neck stiffness. Skin: Negative for color change. Allergic/Immunologic: Negative for food allergies. Neurological: Negative for dizziness, tremors, seizures, syncope, facial asymmetry, speech difficulty, weakness, light-headedness, numbness and headaches. Psychiatric/Behavioral: Negative for behavioral problems, confusion, hallucinations and sleep disturbance. Objective:   /60 (Site: Right Upper Arm, Position: Sitting, Cuff Size: Medium Adult)   Pulse 87   Wt 153 lb (69.4 kg)   BMI 25.07 kg/m²     Physical Exam  Vitals signs reviewed. Eyes:      Pupils: Pupils are equal, round, and reactive to light.    Neck: Musculoskeletal: Normal range of motion. Cardiovascular:      Rate and Rhythm: Normal rate and regular rhythm. Heart sounds: No murmur. Pulmonary:      Effort: Pulmonary effort is normal.      Breath sounds: Normal breath sounds. Abdominal:      General: Bowel sounds are normal.   Musculoskeletal: Normal range of motion. Skin:     General: Skin is warm. Neurological:      Mental Status: She is alert and oriented to person, place, and time. Cranial Nerves: No cranial nerve deficit. Sensory: No sensory deficit. Motor: No abnormal muscle tone. Coordination: Coordination normal.      Deep Tendon Reflexes: Reflexes are normal and symmetric. Babinski sign absent on the right side. Babinski sign absent on the left side. Psychiatric:         Mood and Affect: Mood normal.         [facility] Digital Screen Bilateral    Result Date: 9/12/2017  EXAMINATION: [facility] DIGITAL SCREEN BILATERAL CLINICAL HISTORY:Z12.31 Screening mammogram, encounter for ICD10 COMPARISON: August 6, 2015 FINDINGS: 2-D images were acquired. Breast parenchyma is heterogeneously dense and slightly asymmetric. The central left breast remains denser than the right but stable and unchanged from August 6, 2015. No developing mass or architectural distortion. Benign dense coarse calcifications in both breasts remain stable. No mammographic criteria for malignancy. No change from previous study. CAD analysis was performed and used in the interpretation. BI-RADS 2. Board Certified Radiologists. Accredited by the ACR and FDA. MAMMOGRAPHY IS VERY IMPORTANT TO YOUR HEALTH. THE [employer]ICAN [employer] SOCIETY GUIDELINES RECOMMEND THAT WOMEN 36YEARS OF AGE AND OLDER SHOULD HAVE A MAMMOGRAM EVERY YEAR.  A REMINDER LETTER WILL BE SENT AT THE APPROPRIATE TIME.  THIS FACILITY UTILIZES A REMINDER SYSTEM TO ENSURE ALL PATIENTS RECEIVE REMINDER NOTIFICATIONS AT THE APPROPRIATE TIME BASED ON THE RECOMMENDATIONS OF THIS EXAM. THIS INCLUDES REMINDERS FOR ROUTINE  SCREENING MAMMOGRAMS, DIAGNOSTIC MAMMOGRAMS IN WHICH THE PATIENT IS ASKED TO RETURN FOR ADDITIONAL VIEWS, OR OTHER BREAST IMAGING INTERVENTIONS WHEN APPROPRIATE. THE PATIENT WILL BE PLACED IN THE APPROPRIATE REMINDER SYSTEM INCLUDING A REMINDER AT THE APPROPRIATE TIME FOR ANY PENDING ADDITIONAL VIEWS. Lab Results   Component Value Date    WBC 6.9 05/22/2018    RBC 4.00 05/22/2018    HGB 12.5 05/22/2018    HCT 37.7 05/22/2018    MCV 94.4 05/22/2018    MCH 31.2 05/22/2018    MCHC 33.1 05/22/2018    RDW 12.9 05/22/2018     05/22/2018    MPV 8.6 05/19/2015     Lab Results   Component Value Date     05/22/2018    K 4.1 05/22/2018     05/22/2018    CO2 27 05/22/2018    BUN 11 05/22/2018    CREATININE 0.42 05/22/2018    GFRAA >60.0 05/22/2018    LABGLOM >60.0 05/22/2018    GLUCOSE 211 03/07/2019    PROT 6.8 05/22/2018    LABALBU 4.6 05/22/2018    CALCIUM 10.1 05/22/2018    BILITOT 0.4 05/22/2018    ALKPHOS 146 05/22/2018    AST 26 05/22/2018    ALT 24 05/22/2018     No results found for: PROTIME, INR  Lab Results   Component Value Date    TSH 1.030 05/19/2015    BVZFCPKL82 1152 01/24/2017    IRON 76 01/24/2017    TIBC 466 01/24/2017     Lab Results   Component Value Date    TRIG 254 05/22/2018    HDL 57 05/22/2018    HDL 54 03/25/2011    LDLCALC 57 05/22/2018     No results found for: Verónica Ronni, LABBENZ, CANNAB, COCAINESCRN, LABMETH, OPIATESCREENURINE, PHENCYCLIDINESCREENURINE, PPXUR, ETOH  No results found for: LITHIUM, DILFRTOT, VALPROATE    Assessment:       Diagnosis Orders   1. Dementia of the Alzheimer's type, with early onset, with depressive mood ([address]  donepezil (ARICEPT) 10 MG tablet   2. Temporal headache     3. Tremors of nervous system     Dementia which may suggest a mixed dementia. Patient is on Namenda as well as Aricept. Patient has not declined much in the last year that we have seen her.  Patient still functional. We saw her for tremors and tried on carbidopa levodopa to which she had not responded. She is developing stiffness and may be developing some bradykinesia though she does not have all the features of Parkinson's disease. We are keeping an eye on this. Patient has vascular risk factors and underlying vasculopathy with difficulty walking stiffness and memory issues are not uncommon. Recommended to keep a close eye on her risk factors including her blood sugars as this may accumulate in terms of vasculopathy with further worsening. We will see her in a year and earlier if there are any concerns   Plan:      No orders of the defined types were placed in this encounter. No orders of the defined types were placed in this encounter. No follow-ups on file.       Yasmin Boucher MD

## 2020-12-04 PROBLEM — R05.9 COUGH: Status: RESOLVED | Noted: 2020-11-04 | Resolved: 2020-12-04

## 2021-11-02 DIAGNOSIS — G30.0 DEMENTIA OF THE ALZHEIMER'S TYPE, WITH EARLY ONSET, WITH DEPRESSIVE MOOD (HCC): ICD-10-CM

## 2021-11-02 DIAGNOSIS — F02.83 DEMENTIA OF THE ALZHEIMER'S TYPE, WITH EARLY ONSET, WITH DEPRESSIVE MOOD (HCC): ICD-10-CM

## 2021-11-02 RX ORDER — DONEPEZIL HYDROCHLORIDE 10 MG/1
TABLET, FILM COATED ORAL
Qty: 180 TABLET | Refills: 3 | Status: SHIPPED | OUTPATIENT
Start: 2021-11-02

## 2021-12-28 PROBLEM — M22.2X9 PATELLOFEMORAL STRESS SYNDROME: Status: ACTIVE | Noted: 2021-12-28

## 2021-12-28 PROBLEM — H92.09 OTALGIA: Status: ACTIVE | Noted: 2021-12-28

## 2021-12-28 PROBLEM — R20.8 BURNING SENSATION: Status: ACTIVE | Noted: 2021-12-28

## 2021-12-28 PROBLEM — L98.9 SKIN LESION: Status: ACTIVE | Noted: 2021-12-28

## 2021-12-28 PROBLEM — M53.9 DISORDER OF JOINT OF SPINE: Status: ACTIVE | Noted: 2021-12-28

## 2021-12-28 PROBLEM — M70.70 BURSITIS OF HIP: Status: ACTIVE | Noted: 2021-12-28

## 2021-12-28 PROBLEM — M25.559 HIP PAIN: Status: ACTIVE | Noted: 2021-12-28

## 2021-12-28 PROBLEM — M79.676 PAIN IN TOE: Status: ACTIVE | Noted: 2021-12-28

## 2021-12-28 PROBLEM — M25.569 KNEE PAIN: Status: ACTIVE | Noted: 2021-12-28

## 2022-01-03 ENCOUNTER — OFFICE VISIT (OUTPATIENT)
Dept: NEUROLOGY | Age: 72
End: 2022-01-03
Payer: MEDICARE

## 2022-01-03 VITALS
HEIGHT: 66 IN | SYSTOLIC BLOOD PRESSURE: 135 MMHG | WEIGHT: 147.4 LBS | BODY MASS INDEX: 23.69 KG/M2 | DIASTOLIC BLOOD PRESSURE: 77 MMHG | HEART RATE: 86 BPM

## 2022-01-03 DIAGNOSIS — R25.1 TREMOR: ICD-10-CM

## 2022-01-03 DIAGNOSIS — G30.0 PRIMARY DEGENERATIVE DEMENTIA OF THE ALZHEIMER TYPE, PRESENILE ONSET, UNCOMPLICATED (HCC): Primary | ICD-10-CM

## 2022-01-03 DIAGNOSIS — R51.9 TEMPORAL HEADACHE: ICD-10-CM

## 2022-01-03 DIAGNOSIS — F02.80 PRIMARY DEGENERATIVE DEMENTIA OF THE ALZHEIMER TYPE, PRESENILE ONSET, UNCOMPLICATED (HCC): Primary | ICD-10-CM

## 2022-01-03 PROCEDURE — 1123F ACP DISCUSS/DSCN MKR DOCD: CPT | Performed by: PSYCHIATRY & NEUROLOGY

## 2022-01-03 PROCEDURE — 3017F COLORECTAL CA SCREEN DOC REV: CPT | Performed by: PSYCHIATRY & NEUROLOGY

## 2022-01-03 PROCEDURE — G8399 PT W/DXA RESULTS DOCUMENT: HCPCS | Performed by: PSYCHIATRY & NEUROLOGY

## 2022-01-03 PROCEDURE — 1090F PRES/ABSN URINE INCON ASSESS: CPT | Performed by: PSYCHIATRY & NEUROLOGY

## 2022-01-03 PROCEDURE — G8427 DOCREV CUR MEDS BY ELIG CLIN: HCPCS | Performed by: PSYCHIATRY & NEUROLOGY

## 2022-01-03 PROCEDURE — G8420 CALC BMI NORM PARAMETERS: HCPCS | Performed by: PSYCHIATRY & NEUROLOGY

## 2022-01-03 PROCEDURE — G8484 FLU IMMUNIZE NO ADMIN: HCPCS | Performed by: PSYCHIATRY & NEUROLOGY

## 2022-01-03 PROCEDURE — 1036F TOBACCO NON-USER: CPT | Performed by: PSYCHIATRY & NEUROLOGY

## 2022-01-03 PROCEDURE — 99213 OFFICE O/P EST LOW 20 MIN: CPT | Performed by: PSYCHIATRY & NEUROLOGY

## 2022-01-03 PROCEDURE — 4040F PNEUMOC VAC/ADMIN/RCVD: CPT | Performed by: PSYCHIATRY & NEUROLOGY

## 2022-01-03 RX ORDER — SEMAGLUTIDE 1.34 MG/ML
INJECTION, SOLUTION SUBCUTANEOUS
COMMUNITY

## 2022-01-03 ASSESSMENT — ENCOUNTER SYMPTOMS
CHOKING: 0
NAUSEA: 0
VOMITING: 0
SHORTNESS OF BREATH: 0
TROUBLE SWALLOWING: 0
BACK PAIN: 0
PHOTOPHOBIA: 0
COLOR CHANGE: 0

## 2022-01-03 NOTE — PROGRESS NOTES
Subjective:      Patient ID: Estefanía Vines is a 66 y.o. female who presents today for:  Chief Complaint   Patient presents with    1 Year Follow Up     Dementia; Patient doing well with no new or worsening symptoms       HPI 75-year-old right-handed female with a history of dementia. Patient has also has dementia tremors with pseudobulbar affect. The past few tried on carbidopa levodopa to which patient has not responded she has considerable dyspnea preparation continues on melatonin. 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 Is not worsened in the last year. Her vascular risk factors also better. She still independent in activities of daily living including bathing dressing toileting. The hospital helps with the cooking.   She still has considerable issues with insomnia    Past Medical History:   Diagnosis Date    Allergic rhinitis     Chronic sinusitis     Dementia of the Alzheimer's type, with early onset, with depressive mood ([address] 4/2/2017    Depression     Hx of blood clots     leg    Hyperlipidemia     Hypertension     Menopause     Mild major depression ([address] 1/18/2019    Thyroid disease     Tobacco abuse     Type II or unspecified type diabetes mellitus without mention of complication, not stated as uncontrolled      Past Surgical History:   Procedure Laterality Date    CERVIX SURGERY      CONGATION OF CERVIX    EYE SURGERY Bilateral     lasik    FINGER TRIGGER RELEASE  10/18/2016    HYSTERECTOMY      NASAL SEPTUM SURGERY  11/1999    NM COLON CA SCRN NOT  [address] [place] [address] N/A 7/21/2017    COLONOSCOPY performed by Galo Perez MD at [facility]  08/23/2012 Social History     Socioeconomic History    Marital status:      Spouse name: Not on file    Number of children: Not on file    Years of education: Not on file    Highest education level: Not on file   Occupational History    Not on file   Tobacco Use    Smoking status: Former Smoker     Packs/day: 1.50     Years: 20.00     Pack years: 30.00     Quit date: 1996     Years since quittin.0    Smokeless tobacco: Never Used   Substance and Sexual Activity    Alcohol use: Yes     Alcohol/week: 1.7 standard drinks     Types: 2 Standard drinks or equivalent per week    Drug use: No    Sexual activity: Not Currently   Other Topics Concern    Not on file   Social History Narrative    Not on file     Social Determinants of Health     Financial Resource Strain:     Difficulty of Paying Living Expenses: Not on file   Food Insecurity:     Worried About Running Out of Food in the Last Year: Not on file    Ingris of Food in the Last Year: Not on file   Transportation Needs:     Lack of Transportation (Medical): Not on file    Lack of Transportation (Non-Medical):  Not on file   Physical Activity:     Days of Exercise per Week: Not on file    Minutes of Exercise per Session: Not on file   Stress:     Feeling of Stress : Not on file   Social Connections:     Frequency of Communication with Friends and Family: Not on file    Frequency of Social Gatherings with Friends and Family: Not on file    Attends Scientologist Services: Not on file    Active Member of Clubs or Organizations: Not on file    Attends Club or Organization Meetings: Not on file    Marital Status: Not on file   Intimate Partner Violence:     Fear of Current or Ex-Partner: Not on file    Emotionally Abused: Not on file    Physically Abused: Not on file    Sexually Abused: Not on file   Housing Stability:     Unable to Pay for Housing in the Last Year: Not on file    Number of [place] in the Last Year: Not on file    Unstable Housing in the Last Year: Not on file     Family History   Problem Relation Age of Onset    Diabetes Mother     High Blood Pressure Sister      Allergies   Allergen Reactions    Augmentin [Amoxicillin-Pot Clavulanate] Diarrhea     Patient states she can take regular Amoxicillin, just not Augmentin    Cephalexin      Other reaction(s):  Other: See Comments  Yeast infection    Ciprofloxacin Diarrhea    Darvocet [Propoxyphene N-Acetaminophen]     Effexor [Venlafaxine] Other (See Comments)    Ibuprofen     Macrobid [Nitrofurantoin Monohyd Macro] Other (See Comments)     Patient complained of severe headache and neck pain    Percocet [Oxycodone-Acetaminophen]     Sulfa Antibiotics     Zithromax [Azithromycin] Diarrhea    Vicodin [Hydrocodone-Acetaminophen] Nausea And Vomiting       Current Outpatient Medications   Medication Sig Dispense Refill    Semaglutide,0.25 or 0.5MG/DOS, (OZEMPIC, 0.25 OR 0.5 MG/DOSE,) 2 MG/1.5ML SOPN Inject into the skin      donepezil (ARICEPT) 10 MG tablet TAKE 1 TABLET TWICE A  tablet 3    buPROPion (WELLBUTRIN SR) 150 MG extended release tablet Take by mouth      celecoxib (CELEBREX) 200 MG capsule Take by mouth      LISINOPRIL PO Take by mouth      memantine (NAMENDA) 10 MG tablet Take 1 tablet by mouth 2 times daily 180 tablet 3    fluconazole (DIFLUCAN) 100 MG tablet TAKE ONE TABLET BY MOUTH EVERY DAY 30 tablet 0    pioglitazone (ACTOS) 30 MG tablet TAKE ONE TABLET BY MOUTH EVERY DAY 90 tablet 3    simvastatin (ZOCOR) 40 MG tablet Take 1 tablet by mouth nightly 90 tablet 3    diclofenac (VOLTAREN) 75 MG EC tablet Take 1 tablet by mouth twice a day with meals 180 tablet 3    losartan (COZAAR) 50 MG tablet One daily 90 tablet 3    fexofenadine (ALLEGRA) 180 MG tablet Take 1 tablet by mouth daily 90 tablet 3    ranitidine (ZANTAC) 150 MG tablet Take 1 tablet by mouth 2 times daily 180 tablet 3    erythromycin (ROMYCIN) 5 MG/GM ophthalmic ointment apply a small amount into both eyes every night as needed 1 Tube 6    glipiZIDE (GLIPIZIDE XL) 5 MG extended release tablet Take 1 tablet by mouth daily 90 tablet 2    fluticasone (FLONASE) 50 MCG/ACT nasal spray USE 2 SPRAYS NASALLY DAILY 48 g 3    ondansetron (ZOFRAN) 4 MG tablet Take 1 tablet by mouth every 4 hours as needed for Nausea or Vomiting 30 tablet 1    fluconazole (DIFLUCAN) 100 MG tablet Take 1 tablet by mouth daily 30 tablet 0    Amoxicillin 500 MG TABS tid 30 tablet 0    citalopram (CELEXA) 20 MG tablet Take 1 tablet by mouth daily 90 tablet 1    acyclovir (ZOVIRAX) 400 MG tablet TAKE ONE TABLET BY MOUTH DAILY 90 tablet 3    Lactobacillus (ACIDOPHILUS) 100 MG CAPS Take by mouth      Calcium Polycarbophil (FIBER) 625 MG TABS Take one tablet daily with 8 ounces of water 14 tablet 0    lidocaine (LIDODERM) 5 % Place 1 patch onto the skin daily 12 hours on, 12 hours off. 30 patch 0    hydrOXYzine (ATARAX) 10 MG tablet TAKE ONE TABLET BY MOUTH DAILY AS NEEDED FOR ITCHING 90 tablet 1    Probiotic Product (ALIGN) 4 MG CAPS Take 1 capsule by mouth daily 90 capsule 3    Tuberculin-Allergy Syringes 28G X 1/2\" 1 ML MISC 1 each by Does not apply route daily as needed (as prescribed) 100 each 2    Calcium Citrate-Vitamin D (CALCIUM + D PO) Take by mouth      Fexofenadine HCl (ALLEGRA PO) Take by mouth      Multiple Vitamin (MULTIVITAMIN PO) Take  by mouth daily. No current facility-administered medications for this visit. Review of Systems   Constitutional: Negative for fever. HENT: Negative for ear pain, tinnitus and trouble swallowing. Eyes: Negative for photophobia and visual disturbance. Respiratory: Negative for choking and shortness of breath. Cardiovascular: Negative for chest pain and palpitations. Gastrointestinal: Negative for nausea and vomiting. Musculoskeletal: Negative for back pain, gait problem, joint swelling, myalgias, neck pain and neck stiffness. Skin: Negative for color change. Allergic/Immunologic: Negative for food allergies. Neurological: Negative for dizziness, tremors, seizures, syncope, facial asymmetry, speech difficulty, weakness, light-headedness, numbness and headaches. Psychiatric/Behavioral: Negative for behavioral problems, confusion, hallucinations and sleep disturbance. Objective:   /77 (Site: Left Upper Arm, Position: Sitting, Cuff Size: Small Adult)   Pulse 86   Ht 5' 5.5\" (1.664 m)   Wt 147 lb 6.4 oz (66.9 kg)   BMI 24.16 kg/m²     Physical Exam  Vitals reviewed. Eyes:      Pupils: Pupils are equal, round, and reactive to light. Cardiovascular:      Rate and Rhythm: Normal rate and regular rhythm. Heart sounds: No murmur heard. Pulmonary:      Effort: Pulmonary effort is normal.      Breath sounds: Normal breath sounds. Abdominal:      General: Bowel sounds are normal.   Musculoskeletal:         General: Normal range of motion. Cervical back: Normal range of motion. Skin:     General: Skin is warm. Neurological:      Mental Status: She is alert and oriented to person, place, and time. Cranial Nerves: No cranial nerve deficit. Sensory: No sensory deficit. Motor: No abnormal muscle tone. Coordination: Coordination normal.      Deep Tendon Reflexes: Reflexes are normal and symmetric. Babinski sign absent on the right side. Babinski sign absent on the left side. Psychiatric:         Mood and Affect: Mood normal.         [facility] DIGITAL SCREEN BILATERAL    Result Date: 9/12/2017  EXAMINATION: [facility] DIGITAL SCREEN BILATERAL CLINICAL HISTORY:Z12.31 Screening mammogram, encounter for ICD10 COMPARISON: August 6, 2015 FINDINGS: 2-D images were acquired. Breast parenchyma is heterogeneously dense and slightly asymmetric. The central left breast remains denser than the right but stable and unchanged from August 6, 2015. No developing mass or architectural distortion.  Benign dense coarse calcifications in both breasts remain stable. No mammographic criteria for malignancy. No change from previous study. CAD analysis was performed and used in the interpretation. BI-RADS 2. Board Certified Radiologists. Accredited by the ACR and FDA. MAMMOGRAPHY IS VERY IMPORTANT TO YOUR HEALTH. THE [employer] SOCIETY GUIDELINES RECOMMEND THAT WOMEN 36YEARS OF AGE AND OLDER SHOULD HAVE A MAMMOGRAM EVERY YEAR. A REMINDER LETTER WILL BE SENT AT THE APPROPRIATE TIME.  THIS FACILITY UTILIZES A REMINDER SYSTEM TO ENSURE ALL PATIENTS RECEIVE REMINDER NOTIFICATIONS AT THE APPROPRIATE TIME BASED ON THE RECOMMENDATIONS OF THIS EXAM. THIS INCLUDES REMINDERS FOR ROUTINE  SCREENING MAMMOGRAMS, DIAGNOSTIC MAMMOGRAMS IN WHICH THE PATIENT IS ASKED TO RETURN FOR ADDITIONAL VIEWS, OR OTHER BREAST IMAGING INTERVENTIONS WHEN APPROPRIATE. THE PATIENT WILL BE PLACED IN THE APPROPRIATE REMINDER SYSTEM INCLUDING A REMINDER AT THE APPROPRIATE TIME FOR ANY PENDING ADDITIONAL VIEWS.        Lab Results   Component Value Date    WBC 6.9 05/22/2018    RBC 4.00 05/22/2018    HGB 12.5 05/22/2018    HCT 37.7 05/22/2018    MCV 94.4 05/22/2018    MCH 31.2 05/22/2018    MCHC 33.1 05/22/2018    RDW 12.9 05/22/2018     05/22/2018    MPV 8.6 05/19/2015     Lab Results   Component Value Date     05/22/2018    K 4.1 05/22/2018     05/22/2018    CO2 27 05/22/2018    BUN 11 05/22/2018    CREATININE 0.42 05/22/2018    GFRAA >60.0 05/22/2018    LABGLOM >60.0 05/22/2018    GLUCOSE 211 03/07/2019    PROT 6.8 05/22/2018    LABALBU 4.6 05/22/2018    CALCIUM 10.1 05/22/2018    BILITOT 0.4 05/22/2018    ALKPHOS 146 05/22/2018    AST 26 05/22/2018    ALT 24 05/22/2018     No results found for: PROTIME, INR  Lab Results   Component Value Date    TSH 1.030 05/19/2015    VHTUFWTG24 1152 01/24/2017    IRON 76 01/24/2017    TIBC 466 01/24/2017     Lab Results   Component Value Date    TRIG 254 05/22/2018    HDL 57 05/22/2018    HDL 54 03/25/2011    LDLCALC 57 05/22/2018     No results found for: Meryle Tish, LABBENZ, CANNAB, COCAINESCRN, LABMETH, OPIATESCREENURINE, PHENCYCLIDINESCREENURINE, PPXUR, ETOH  No results found for: LITHIUM, DILFRTOT, VALPROATE    Assessment:       Diagnosis Orders   1. Primary degenerative dementia of the Alzheimer type, presenile onset, uncomplicated ([address]     2. Temporal headache     3. Tremor     Alzheimer's dementia without any further worsening. Patient is on Aricept as well as Namenda without any changes. Patient has not worsened in the last year. She has a primary tremor which could be a mild parkinsonian tremor though could be arteriosclerotic parkinsonism not responsive to carbidopa levodopa. She is remained stable in the last year without any major decline in her activity of daily living. At this time there are no other options. The  did bring me some literature regarding use of Aduhelm, the new drug for him as dementia . Patient may not be a candidate for the same as well as the drug does not show a significant response and we had a lengthy discussion regarding the expectation of the drug including cerebral edema and seizures and a very minimal to modest response in terms of treatments. Patient also has a history of vascular disease which is usually a contraindication to some of the amyloid drugs as well. We will keep an eye on the second drug that is due to be marked sooner or later. Patient did have some previous features of pseudobulbar affect which has not reoccurred. We will keep an eye on this. Plan:      No orders of the defined types were placed in this encounter. No orders of the defined types were placed in this encounter. No follow-ups on file.       Kushal Frederick MD

## 2022-01-10 RX ORDER — MEMANTINE HYDROCHLORIDE 10 MG/1
TABLET ORAL
Qty: 180 TABLET | Refills: 3 | Status: SHIPPED | OUTPATIENT
Start: 2022-01-10

## 2023-01-04 ENCOUNTER — OFFICE VISIT (OUTPATIENT)
Dept: NEUROLOGY | Age: 73
End: 2023-01-04
Payer: MEDICARE

## 2023-01-04 VITALS
SYSTOLIC BLOOD PRESSURE: 130 MMHG | HEART RATE: 67 BPM | WEIGHT: 150 LBS | BODY MASS INDEX: 24.58 KG/M2 | DIASTOLIC BLOOD PRESSURE: 68 MMHG

## 2023-01-04 DIAGNOSIS — R26.89 FRONTAL GAIT: ICD-10-CM

## 2023-01-04 DIAGNOSIS — F02.83 DEMENTIA OF THE ALZHEIMER'S TYPE, WITH EARLY ONSET, WITH DEPRESSIVE MOOD (HCC): Primary | ICD-10-CM

## 2023-01-04 DIAGNOSIS — G30.0 DEMENTIA OF THE ALZHEIMER'S TYPE, WITH EARLY ONSET, WITH DEPRESSIVE MOOD (HCC): Primary | ICD-10-CM

## 2023-01-04 DIAGNOSIS — S09.90XA CLOSED HEAD INJURY, INITIAL ENCOUNTER: ICD-10-CM

## 2023-01-04 PROCEDURE — G8420 CALC BMI NORM PARAMETERS: HCPCS | Performed by: PSYCHIATRY & NEUROLOGY

## 2023-01-04 PROCEDURE — 3074F SYST BP LT 130 MM HG: CPT | Performed by: PSYCHIATRY & NEUROLOGY

## 2023-01-04 PROCEDURE — G8427 DOCREV CUR MEDS BY ELIG CLIN: HCPCS | Performed by: PSYCHIATRY & NEUROLOGY

## 2023-01-04 PROCEDURE — 99214 OFFICE O/P EST MOD 30 MIN: CPT | Performed by: PSYCHIATRY & NEUROLOGY

## 2023-01-04 PROCEDURE — 1090F PRES/ABSN URINE INCON ASSESS: CPT | Performed by: PSYCHIATRY & NEUROLOGY

## 2023-01-04 PROCEDURE — 1123F ACP DISCUSS/DSCN MKR DOCD: CPT | Performed by: PSYCHIATRY & NEUROLOGY

## 2023-01-04 PROCEDURE — G8484 FLU IMMUNIZE NO ADMIN: HCPCS | Performed by: PSYCHIATRY & NEUROLOGY

## 2023-01-04 PROCEDURE — 3078F DIAST BP <80 MM HG: CPT | Performed by: PSYCHIATRY & NEUROLOGY

## 2023-01-04 PROCEDURE — G8399 PT W/DXA RESULTS DOCUMENT: HCPCS | Performed by: PSYCHIATRY & NEUROLOGY

## 2023-01-04 PROCEDURE — 3017F COLORECTAL CA SCREEN DOC REV: CPT | Performed by: PSYCHIATRY & NEUROLOGY

## 2023-01-04 PROCEDURE — 1036F TOBACCO NON-USER: CPT | Performed by: PSYCHIATRY & NEUROLOGY

## 2023-01-04 RX ORDER — MEMANTINE HYDROCHLORIDE 10 MG/1
TABLET ORAL
Qty: 180 TABLET | Refills: 3 | Status: SHIPPED | OUTPATIENT
Start: 2023-01-04

## 2023-01-04 RX ORDER — DONEPEZIL HYDROCHLORIDE 10 MG/1
TABLET, FILM COATED ORAL
Qty: 180 TABLET | Refills: 3 | Status: SHIPPED | OUTPATIENT
Start: 2023-01-04

## 2023-01-04 ASSESSMENT — ENCOUNTER SYMPTOMS
TROUBLE SWALLOWING: 0
VOMITING: 0
SHORTNESS OF BREATH: 0
BACK PAIN: 0
NAUSEA: 0
PHOTOPHOBIA: 0
CHOKING: 0
COLOR CHANGE: 0

## 2023-01-04 NOTE — PROGRESS NOTES
Subjective:      Patient ID: Damien Schrader is a 74 y.o. female who presents today for:  Chief Complaint   Patient presents with    Follow-up     Pts  thinks that she is getting a little worse with her memory. Pt is not driving, not cooking, not doing any house hold chores, bathing self and dressing with some help. Pt fell about 2 weeks ago and his her face and still has some bruising. HPI 74 right-handed female history of primary degenerative dementia with tremors and headaches. Patient ports that the memory is getting somewhat worse. Patient is notable to have Haris and is not able to continue with household chores. She requires help with bathing as well. She fell 2 weeks ago on her face. She is not quite sure how this happened she was seen at [facility] AT [place].  We see her on a yearly basis is only on Aricept.   She also is on Namenda she is maxed out on neurocognitive modulators  Patient still goes to the bathroom on her own and does have some activity though continues to decline    Past Medical History:   Diagnosis Date    Allergic rhinitis     Chronic sinusitis     Dementia of the Alzheimer's type, with early onset, with depressive mood ([address] 4/2/2017    Depression     Hx of blood clots     leg    Hyperlipidemia     Hypertension     Menopause     Mild major depression ([address] 1/18/2019    Thyroid disease     Tobacco abuse     Type II or unspecified type diabetes mellitus without mention of complication, not stated as uncontrolled      Past Surgical History:   Procedure Laterality Date    CERVIX SURGERY      CONGATION OF CERVIX    EYE SURGERY Bilateral     lasik    FINGER TRIGGER RELEASE  10/18/2016    HYSTERECTOMY (CERVIX STATUS UNKNOWN)      NASAL SEPTUM SURGERY  11/1999    DC COLON CA SCRN NOT HI RSK IND N/A 7/21/2017    COLONOSCOPY performed by Chris Renteria MD at [facility]  08/23/2012     Social History Socioeconomic History    Marital status:      Spouse name: Not on file    Number of children: Not on file    Years of education: Not on file    Highest education level: Not on file   Occupational History    Not on file   Tobacco Use    Smoking status: Former     Packs/day: 1.50     Years: 20.00     Pack years: 30.00     Types: Cigarettes     Quit date: 1996     Years since quittin.0    Smokeless tobacco: Never   Substance and Sexual Activity    Alcohol use: Yes     Alcohol/week: 1.7 standard drinks     Types: 2 Standard drinks or equivalent per week    Drug use: No    Sexual activity: Not Currently   Other Topics Concern    Not on file   Social History Narrative    Not on file     Social Determinants of Health     Financial Resource Strain: Not on file   Food Insecurity: Not on file   Transportation Needs: Not on file   Physical Activity: Not on file   Stress: Not on file   Social Connections: Not on file   Intimate Partner Violence: Not on file   Housing Stability: Not on file     Family History   Problem Relation Age of Onset    Diabetes Mother     High Blood Pressure Sister      Allergies   Allergen Reactions    Augmentin [Amoxicillin-Pot Clavulanate] Diarrhea     Patient states she can take regular Amoxicillin, just not Augmentin    Cephalexin      Other reaction(s):  Other: See Comments  Yeast infection    Ciprofloxacin Diarrhea    Darvocet [Propoxyphene N-Acetaminophen]     Effexor [Venlafaxine] Other (See Comments)    Ibuprofen     Macrobid [Nitrofurantoin Monohyd Macro] Other (See Comments)     Patient complained of severe headache and neck pain    Percocet [Oxycodone-Acetaminophen]     Sulfa Antibiotics     Zithromax [Azithromycin] Diarrhea    Vicodin [Hydrocodone-Acetaminophen] Nausea And Vomiting       Current Outpatient Medications   Medication Sig Dispense Refill    memantine (NAMENDA) 10 MG tablet TAKE 1 TABLET TWICE A  tablet 3    Semaglutide,0.25 or 0.5MG/DOS, (OZEMPIC, 0.25 OR 0.5 MG/DOSE,) 2 MG/1.5ML SOPN Inject into the skin      donepezil (ARICEPT) 10 MG tablet TAKE 1 TABLET TWICE A  tablet 3    buPROPion (WELLBUTRIN SR) 150 MG extended release tablet Take by mouth      celecoxib (CELEBREX) 200 MG capsule Take by mouth      LISINOPRIL PO Take by mouth      fluconazole (DIFLUCAN) 100 MG tablet TAKE ONE TABLET BY MOUTH EVERY DAY 30 tablet 0    pioglitazone (ACTOS) 30 MG tablet TAKE ONE TABLET BY MOUTH EVERY DAY 90 tablet 3    simvastatin (ZOCOR) 40 MG tablet Take 1 tablet by mouth nightly 90 tablet 3    diclofenac (VOLTAREN) 75 MG EC tablet Take 1 tablet by mouth twice a day with meals 180 tablet 3    losartan (COZAAR) 50 MG tablet One daily 90 tablet 3    fexofenadine (ALLEGRA) 180 MG tablet Take 1 tablet by mouth daily 90 tablet 3    erythromycin (ROMYCIN) 5 MG/GM ophthalmic ointment apply a small amount into both eyes every night as needed 1 Tube 6    glipiZIDE (GLIPIZIDE XL) 5 MG extended release tablet Take 1 tablet by mouth daily 90 tablet 2    fluticasone (FLONASE) 50 MCG/ACT nasal spray USE 2 SPRAYS NASALLY DAILY 48 g 3    ondansetron (ZOFRAN) 4 MG tablet Take 1 tablet by mouth every 4 hours as needed for Nausea or Vomiting 30 tablet 1    Amoxicillin 500 MG TABS tid 30 tablet 0    citalopram (CELEXA) 20 MG tablet Take 1 tablet by mouth daily 90 tablet 1    acyclovir (ZOVIRAX) 400 MG tablet TAKE ONE TABLET BY MOUTH DAILY 90 tablet 3    Calcium Polycarbophil (FIBER) 625 MG TABS Take one tablet daily with 8 ounces of water 14 tablet 0    lidocaine (LIDODERM) 5 % Place 1 patch onto the skin daily 12 hours on, 12 hours off.  30 patch 0    hydrOXYzine (ATARAX) 10 MG tablet TAKE ONE TABLET BY MOUTH DAILY AS NEEDED FOR ITCHING 90 tablet 1    Tuberculin-Allergy Syringes 28G X 1/2\" 1 ML MISC 1 each by Does not apply route daily as needed (as prescribed) 100 each 2    Calcium Citrate-Vitamin D (CALCIUM + D PO) Take by mouth      Multiple Vitamin (MULTIVITAMIN PO) Take  by mouth daily.        ranitidine (ZANTAC) 150 MG tablet Take 1 tablet by mouth 2 times daily (Patient not taking: Reported on 1/4/2023) 180 tablet 3     No current facility-administered medications for this visit. Review of Systems   Constitutional:  Negative for fever. HENT:  Negative for ear pain, tinnitus and trouble swallowing. Eyes:  Negative for photophobia and visual disturbance. Respiratory:  Negative for choking and shortness of breath. Cardiovascular:  Negative for chest pain and palpitations. Gastrointestinal:  Negative for nausea and vomiting. Musculoskeletal:  Positive for gait problem. Negative for back pain, joint swelling, myalgias, neck pain and neck stiffness. Skin:  Negative for color change. Allergic/Immunologic: Negative for food allergies. Neurological:  Negative for dizziness, tremors, seizures, syncope, facial asymmetry, speech difficulty, weakness, light-headedness, numbness and headaches. Psychiatric/Behavioral:  Negative for behavioral problems, confusion, hallucinations and sleep disturbance. Objective:   /68 (Site: Left Upper Arm, Position: Sitting, Cuff Size: Medium Adult)   Pulse 67   Wt 150 lb (68 kg)   BMI 24.58 kg/m²     Physical Exam  Vitals reviewed. Eyes:      Pupils: Pupils are equal, round, and reactive to light. Cardiovascular:      Rate and Rhythm: Normal rate and regular rhythm. Heart sounds: No murmur heard. Pulmonary:      Effort: Pulmonary effort is normal.      Breath sounds: Normal breath sounds. Abdominal:      General: Bowel sounds are normal.   Musculoskeletal:         General: Normal range of motion. Cervical back: Normal range of motion. Skin:     General: Skin is warm. Neurological:      Mental Status: She is alert and oriented to person, place, and time. Cranial Nerves: No cranial nerve deficit. Sensory: No sensory deficit. Motor: No abnormal muscle tone.       Coordination: Coordination normal.      Deep Tendon Reflexes: Reflexes are normal and symmetric. Babinski sign absent on the right side. Babinski sign absent on the left side. Psychiatric:         Mood and Affect: Mood normal.         Cognition and Memory: Cognition is impaired. Memory is impaired. She exhibits impaired recent memory and impaired remote memory. [facility] DIGITAL SCREEN BILATERAL    Result Date: 9/12/2017  EXAMINATION: [facility] DIGITAL SCREEN BILATERAL CLINICAL HISTORY:Z12.31 Screening mammogram, encounter for ICD10 COMPARISON: August 6, 2015 FINDINGS: 2-D images were acquired. Breast parenchyma is heterogeneously dense and slightly asymmetric. The central left breast remains denser than the right but stable and unchanged from August 6, 2015. No developing mass or architectural distortion. Benign dense coarse calcifications in both breasts remain stable. No mammographic criteria for malignancy. No change from previous study. CAD analysis was performed and used in the interpretation. BI-RADS 2. Board Certified Radiologists. Accredited by the ACR and FDA. MAMMOGRAPHY IS VERY IMPORTANT TO YOUR HEALTH. THE AMERICAN CANCER SOCIETY GUIDELINES RECOMMEND THAT WOMEN 36YEARS OF AGE AND OLDER SHOULD HAVE A MAMMOGRAM EVERY YEAR. A REMINDER LETTER WILL BE SENT AT THE APPROPRIATE TIME.  THIS FACILITY UTILIZES A REMINDER SYSTEM TO ENSURE ALL PATIENTS RECEIVE REMINDER NOTIFICATIONS AT THE APPROPRIATE TIME BASED ON THE RECOMMENDATIONS OF THIS EXAM. THIS INCLUDES REMINDERS FOR ROUTINE  SCREENING MAMMOGRAMS, DIAGNOSTIC MAMMOGRAMS IN WHICH THE PATIENT IS ASKED TO RETURN FOR ADDITIONAL VIEWS, OR OTHER BREAST IMAGING INTERVENTIONS WHEN APPROPRIATE. THE PATIENT WILL BE PLACED IN THE APPROPRIATE REMINDER SYSTEM INCLUDING A REMINDER AT THE APPROPRIATE TIME FOR ANY PENDING ADDITIONAL VIEWS.        Lab Results   Component Value Date/Time    WBC 6.9 05/22/2018 03:37 PM    RBC 4.00 05/22/2018 03:37 PM    HGB 12.5 05/22/2018 03:37 PM    HCT 37.7 05/22/2018 03:37 PM    MCV 94.4 05/22/2018 03:37 PM    MCH 31.2 05/22/2018 03:37 PM    MCHC 33.1 05/22/2018 03:37 PM    RDW 12.9 05/22/2018 03:37 PM     05/22/2018 03:37 PM    MPV 8.6 05/19/2015 12:43 PM     Lab Results   Component Value Date/Time     11/28/2022 12:07 PM    K 4.1 11/28/2022 12:07 PM     11/28/2022 12:07 PM    CO2 27 05/22/2018 03:37 PM    BUN 11 05/22/2018 03:37 PM    CREATININE 0.61 11/28/2022 12:07 PM    GFRAA >60.0 05/22/2018 03:37 PM    LABGLOM >60.0 05/22/2018 03:37 PM    GLUCOSE 110 11/28/2022 12:07 PM    PROT 6.8 11/28/2022 12:07 PM    LABALBU 4.3 11/28/2022 12:07 PM    CALCIUM 10.1 11/28/2022 12:07 PM    BILITOT 0.7 11/28/2022 12:07 PM    ALKPHOS 93 11/28/2022 12:07 PM    AST 21 11/28/2022 12:07 PM    ALT 15 11/28/2022 12:07 PM     No results found for: PROTIME, INR  Lab Results   Component Value Date/Time    TSH 1.030 05/19/2015 12:43 PM    SRHSTZBH41 1152 01/24/2017 03:17 PM    IRON 76 01/24/2017 03:17 PM    TIBC 466 01/24/2017 03:17 PM     Lab Results   Component Value Date/Time    TRIG 165 11/28/2022 12:07 PM    HDL 62.3 11/28/2022 12:07 PM    HDL 54 03/25/2011 12:00 AM    LDLCALC 57 05/22/2018 03:37 PM    LABVLDL 33 11/28/2022 12:07 PM     No results found for: Pamella Alvino, CANNAB, COCAINESCRN, LABMETH, OPIATESCREENURINE, PHENCYCLIDINESCREENURINE, PPXUR, ETOH  No results found for: LITHIUM, DILFRTOT, VALPROATE    Assessment:       Diagnosis Orders   1. Dementia of the Alzheimer's type, with early onset, with depressive mood ([facility]        2. Closed head injury, initial encounter        3. Frontal gait        Alzhemers  dementia which is worsening. Patient had a fall of unclear etiology. She was seen at [facility] AT [place].  She had CT scans and everything else. She had a large wound on the scalp. She is not quite sure what happened and there was no reports of loss of consciousness. Patient is continued to decline in her activity of daily living. Examination is performed and she does not have any concussive symptoms and there is no residuals of a fall patient has any new headache or neck pain. Tremor is not worsened and she is not developed any parkinsonian features. She is maxed out on the medications available for cognitive impairment and nothing else for now can be added. Safety issues have been discussed and we will continue to follow. Kalyan Lange MD, Kalin Mancuso, American Board of Psychiatry & Neurology  Board Certified in Vascular Neurology  Board Certified in Neuromuscular Medicine  Certified in [facility]:      No orders of the defined types were placed in this encounter. No orders of the defined types were placed in this encounter. No follow-ups on file.       Nanci Pierce MD

## 2023-05-04 NOTE — TELEPHONE ENCOUNTER
She said the doxycycline is not agreeing with her and gives her terrible heartburn. document embedded image  Patient Name: Steven Dennison    Address: [address] [place] [address]     YOB: 1938    [place] [id number]    MR Number: [id number]    Phone: [phone]  PCP: Nawaf Montanez MD            Appointment Date: 05/02/23   Visit Provider: Catrachito Mccain MD    cc: Nawaf Montanez MD; ~    ENT Progress Note  Intake  Visit Reasons: Bilateral ear cleaning    HPI  History of Present Illness  Chief complaint:  Plugged ears    History  The patient is an 84-year-old man who comes to the office today with plugged ears bilaterally.  He does not recall having similar difficulties in the past.     Exam  His ears are tender and he is unable to be still for cleaning here in the office this morning.  His ear canals are completely occluded with cerumen is far as I can tell.  Remainder of the head neck exam is unremarkable    Allergies    No Known Allergies Allergy (Unknown, Uncoded 07/01/04 06:27)    A&P  Assessment & Plan  (1) Impacted cerumen, bilateral:        Status: Acute        Code(s):  H61.23 - Impacted cerumen, bilateral  We will start him on some drops and see if we can clear the cerumen was suction in a couple of weeks.                 Medications:  New  ofloxacin 0.3% 3 drps  OTIC Q12H 10 days 7.5 mL 0RF                     Catrachito Mccain MD    Filed: 05/03/23 1136    <Electronically signed by Catrachito Mccain MD> 05/03/23 1141

## 2023-05-30 ENCOUNTER — OFFICE VISIT (OUTPATIENT)
Dept: PRIMARY CARE | Facility: CLINIC | Age: 73
End: 2023-05-30
Payer: MEDICARE

## 2023-05-30 VITALS
BODY MASS INDEX: 24.09 KG/M2 | OXYGEN SATURATION: 94 % | SYSTOLIC BLOOD PRESSURE: 116 MMHG | DIASTOLIC BLOOD PRESSURE: 70 MMHG | HEIGHT: 65 IN | WEIGHT: 144.6 LBS | RESPIRATION RATE: 16 BRPM | HEART RATE: 86 BPM

## 2023-05-30 DIAGNOSIS — G30.1 MILD LATE ONSET ALZHEIMER'S DEMENTIA WITH ANXIETY (MULTI): ICD-10-CM

## 2023-05-30 DIAGNOSIS — R26.89 IMBALANCE: ICD-10-CM

## 2023-05-30 DIAGNOSIS — I10 HTN (HYPERTENSION), BENIGN: Primary | ICD-10-CM

## 2023-05-30 DIAGNOSIS — E78.5 DYSLIPIDEMIA: ICD-10-CM

## 2023-05-30 DIAGNOSIS — J02.0 ACUTE STREPTOCOCCAL PHARYNGITIS: ICD-10-CM

## 2023-05-30 DIAGNOSIS — E11.9 TYPE 2 DIABETES MELLITUS WITHOUT COMPLICATION, WITHOUT LONG-TERM CURRENT USE OF INSULIN (MULTI): ICD-10-CM

## 2023-05-30 DIAGNOSIS — F02.A4 MILD LATE ONSET ALZHEIMER'S DEMENTIA WITH ANXIETY (MULTI): ICD-10-CM

## 2023-05-30 LAB
POC FINGERSTICK BLOOD GLUCOSE: 160 MG/DL (ref 70–100)
POC HEMOGLOBIN A1C: 6.5 % (ref 4.2–6.5)

## 2023-05-30 PROCEDURE — 83036 HEMOGLOBIN GLYCOSYLATED A1C: CPT | Performed by: FAMILY MEDICINE

## 2023-05-30 PROCEDURE — 82962 GLUCOSE BLOOD TEST: CPT | Performed by: FAMILY MEDICINE

## 2023-05-30 PROCEDURE — 99214 OFFICE O/P EST MOD 30 MIN: CPT | Performed by: FAMILY MEDICINE

## 2023-05-30 RX ORDER — ACYCLOVIR 400 MG/1
400 TABLET ORAL DAILY
COMMUNITY
Start: 2006-05-03 | End: 2023-11-27

## 2023-05-30 RX ORDER — SIMVASTATIN 40 MG/1
40 TABLET, FILM COATED ORAL DAILY
COMMUNITY
Start: 2009-05-27 | End: 2023-11-27

## 2023-05-30 RX ORDER — GLIPIZIDE 10 MG/1
10 TABLET, FILM COATED, EXTENDED RELEASE ORAL DAILY
COMMUNITY
Start: 2020-12-19 | End: 2023-08-31 | Stop reason: DRUGHIGH

## 2023-05-30 RX ORDER — SEMAGLUTIDE 1.34 MG/ML
0.5 INJECTION, SOLUTION SUBCUTANEOUS
COMMUNITY
Start: 2021-01-25 | End: 2023-08-31 | Stop reason: ALTCHOICE

## 2023-05-30 RX ORDER — ACETAMINOPHEN 500 MG
50 TABLET ORAL DAILY
COMMUNITY
End: 2024-06-03 | Stop reason: WASHOUT

## 2023-05-30 RX ORDER — DONEPEZIL HYDROCHLORIDE 10 MG/1
10 TABLET, FILM COATED ORAL 2 TIMES DAILY
COMMUNITY
End: 2023-11-30 | Stop reason: SDUPTHER

## 2023-05-30 RX ORDER — BUPROPION HYDROCHLORIDE 150 MG/1
1 TABLET, EXTENDED RELEASE ORAL 2 TIMES DAILY
COMMUNITY
Start: 2004-07-29 | End: 2023-11-30 | Stop reason: SDUPTHER

## 2023-05-30 RX ORDER — AMOXICILLIN 500 MG/1
500 TABLET, FILM COATED ORAL 3 TIMES DAILY
COMMUNITY
Start: 2023-02-28 | End: 2023-08-31 | Stop reason: SDUPTHER

## 2023-05-30 RX ORDER — LISINOPRIL 20 MG/1
20 TABLET ORAL DAILY
COMMUNITY
Start: 2010-11-29 | End: 2024-05-30 | Stop reason: WASHOUT

## 2023-05-30 RX ORDER — PIOGLITAZONEHYDROCHLORIDE 45 MG/1
45 TABLET ORAL DAILY
COMMUNITY
Start: 2020-09-10 | End: 2023-11-30 | Stop reason: SDUPTHER

## 2023-05-30 RX ORDER — AMOXICILLIN 500 MG/1
500 CAPSULE ORAL 3 TIMES DAILY
Qty: 30 CAPSULE | Refills: 0 | Status: SHIPPED | OUTPATIENT
Start: 2023-05-30 | End: 2023-06-09

## 2023-05-30 RX ORDER — MEMANTINE HYDROCHLORIDE 10 MG/1
10 TABLET ORAL 2 TIMES DAILY
COMMUNITY
Start: 2020-06-04 | End: 2023-11-30 | Stop reason: SDUPTHER

## 2023-05-30 RX ORDER — DICLOFENAC SODIUM 75 MG/1
1 TABLET, DELAYED RELEASE ORAL 2 TIMES DAILY
COMMUNITY
Start: 2010-11-29 | End: 2023-07-28

## 2023-05-30 ASSESSMENT — ENCOUNTER SYMPTOMS
ABDOMINAL PAIN: 0
ADENOPATHY: 0
POLYDIPSIA: 0
FLANK PAIN: 0
SHORTNESS OF BREATH: 0
COLOR CHANGE: 0
AGITATION: 0
PHOTOPHOBIA: 0
SLEEP DISTURBANCE: 0
EYE PAIN: 0
HEADACHES: 0
SPEECH DIFFICULTY: 0
HEMATURIA: 0
DIZZINESS: 0
CONFUSION: 0
COUGH: 0
SORE THROAT: 0
NECK STIFFNESS: 0
RECTAL PAIN: 0
FEVER: 0
RHINORRHEA: 0
SINUS PRESSURE: 0
DECREASED CONCENTRATION: 0
CONSTIPATION: 0
POLYPHAGIA: 0
CHEST TIGHTNESS: 0
SEIZURES: 0
DIARRHEA: 0
BLOOD IN STOOL: 0
MYALGIAS: 0
CONSTITUTIONAL NEGATIVE: 1
FATIGUE: 0
PALPITATIONS: 0
APPETITE CHANGE: 0
ACTIVITY CHANGE: 0
OCCASIONAL FEELINGS OF UNSTEADINESS: 1
DEPRESSION: 1
DYSPHORIC MOOD: 0
NERVOUS/ANXIOUS: 0
DIABETIC ASSOCIATED SYMPTOMS: 0
STRIDOR: 0
SINUS PAIN: 0
DYSURIA: 0
TROUBLE SWALLOWING: 0
ABDOMINAL DISTENTION: 0
ARTHRALGIAS: 0

## 2023-05-30 NOTE — PROGRESS NOTES
Subjective   Patient ID: Raissa Puentes is a 72 y.o. female who presents for Diabetes.    Diabetes  She presents for her follow-up diabetic visit. She has type 2 diabetes mellitus. No MedicAlert identification noted. Pertinent negatives for hypoglycemia include no confusion, dizziness, headaches, nervousness/anxiousness, seizures or speech difficulty. There are no diabetic associated symptoms. Pertinent negatives for diabetes include no chest pain, no fatigue, no polydipsia, no polyphagia and no polyuria. There are no hypoglycemic complications. Symptoms are improving. Risk factors for coronary artery disease include hypertension, dyslipidemia and diabetes mellitus.        Review of Systems   Constitutional: Negative.  Negative for activity change, appetite change, fatigue and fever.   HENT:  Negative for congestion, dental problem, ear discharge, ear pain, mouth sores, rhinorrhea, sinus pressure, sinus pain, sore throat, tinnitus and trouble swallowing.    Eyes:  Negative for photophobia, pain and visual disturbance.   Respiratory:  Negative for cough, chest tightness, shortness of breath and stridor.    Cardiovascular:  Negative for chest pain and palpitations.   Gastrointestinal:  Negative for abdominal distention, abdominal pain, blood in stool, constipation, diarrhea and rectal pain.   Endocrine: Negative for cold intolerance, heat intolerance, polydipsia, polyphagia and polyuria.   Genitourinary:  Negative for dysuria, flank pain, hematuria and urgency.   Musculoskeletal:  Negative for arthralgias, gait problem, myalgias and neck stiffness.   Skin:  Negative for color change and rash.   Allergic/Immunologic: Negative for environmental allergies and food allergies.   Neurological:  Negative for dizziness, seizures, syncope, speech difficulty and headaches.   Hematological:  Negative for adenopathy.   Psychiatric/Behavioral:  Negative for agitation, confusion, decreased concentration, dysphoric mood and sleep  "disturbance. The patient is not nervous/anxious.        Objective   /70 (BP Location: Right arm, Patient Position: Sitting, BP Cuff Size: Adult)   Pulse 86   Resp 16   Ht 1.651 m (5' 5\")   Wt 65.6 kg (144 lb 9.6 oz)   SpO2 94%   BMI 24.06 kg/m²     Physical Exam  Vitals reviewed.   Constitutional:       General: She is not in acute distress.     Appearance: Normal appearance. She is normal weight. She is not ill-appearing or diaphoretic.   HENT:      Head: Normocephalic.      Right Ear: Tympanic membrane and external ear normal.      Left Ear: Tympanic membrane and external ear normal.      Nose: Nose normal. No congestion.      Mouth/Throat:      Mouth: Mucous membranes are dry.      Pharynx: No posterior oropharyngeal erythema.   Eyes:      General:         Right eye: No discharge.         Left eye: No discharge.      Extraocular Movements: Extraocular movements intact.      Conjunctiva/sclera: Conjunctivae normal.      Pupils: Pupils are equal, round, and reactive to light.   Cardiovascular:      Rate and Rhythm: Normal rate and regular rhythm.      Pulses: Normal pulses.      Heart sounds: Normal heart sounds. No murmur heard.  Pulmonary:      Effort: Pulmonary effort is normal. No respiratory distress.      Breath sounds: Normal breath sounds. No wheezing or rales.   Chest:      Chest wall: No tenderness.   Abdominal:      General: Abdomen is flat. Bowel sounds are normal. There is no distension.      Palpations: There is no mass.      Tenderness: There is no abdominal tenderness. There is no guarding.   Genitourinary:     Rectum: Normal.   Musculoskeletal:         General: No tenderness. Normal range of motion.      Cervical back: Normal range of motion and neck supple. No tenderness.      Right lower leg: No edema.      Left lower leg: No edema.   Skin:     General: Skin is warm and dry.      Coloration: Skin is not jaundiced.      Findings: No bruising or erythema.   Neurological:      General: " No focal deficit present.      Mental Status: She is alert and oriented to person, place, and time. Mental status is at baseline.      Cranial Nerves: No cranial nerve deficit.      Sensory: No sensory deficit.      Coordination: Coordination normal.      Gait: Gait normal.   Psychiatric:         Mood and Affect: Mood normal.         Thought Content: Thought content normal.         Judgment: Judgment normal.       Assessment/Plan   Problem List Items Addressed This Visit    None  Visit Diagnoses       HTN (hypertension), benign    -  Primary    Type 2 diabetes mellitus without complication, without long-term current use of insulin (CMS/[facility])        Relevant Medications    semaglutide 0.25 mg or 0.5 mg (2 mg/3 mL) pen injector    Other Relevant Orders    POCT Fingerstick glucose manually resulted (Completed)    POCT glycosylated hemoglobin (Hb A1C) manually resulted (Completed)    Mild late onset Alzheimer's dementia with anxiety (CMS/[facility])        Dyslipidemia        Imbalance        Relevant Orders    Referral to Physical Therapy    Acute streptococcal pharyngitis        Relevant Medications    amoxicillin (Amoxil) 500 mg capsule

## 2023-07-28 DIAGNOSIS — M54.50 LOW BACK PAIN, UNSPECIFIED BACK PAIN LATERALITY, UNSPECIFIED CHRONICITY, UNSPECIFIED WHETHER SCIATICA PRESENT: ICD-10-CM

## 2023-07-28 RX ORDER — DICLOFENAC SODIUM 75 MG/1
75 TABLET, DELAYED RELEASE ORAL 2 TIMES DAILY
Qty: 180 TABLET | Refills: 3 | Status: SHIPPED | OUTPATIENT
Start: 2023-07-28 | End: 2023-11-30 | Stop reason: SDUPTHER

## 2023-08-31 ENCOUNTER — OFFICE VISIT (OUTPATIENT)
Dept: PRIMARY CARE | Facility: CLINIC | Age: 73
End: 2023-08-31
Payer: MEDICARE

## 2023-08-31 VITALS
OXYGEN SATURATION: 96 % | RESPIRATION RATE: 16 BRPM | HEIGHT: 65 IN | BODY MASS INDEX: 23.32 KG/M2 | DIASTOLIC BLOOD PRESSURE: 66 MMHG | HEART RATE: 75 BPM | SYSTOLIC BLOOD PRESSURE: 122 MMHG | WEIGHT: 140 LBS

## 2023-08-31 DIAGNOSIS — I10 PRIMARY HYPERTENSION: ICD-10-CM

## 2023-08-31 DIAGNOSIS — E78.2 MIXED HYPERLIPIDEMIA: ICD-10-CM

## 2023-08-31 DIAGNOSIS — J02.0 ACUTE STREPTOCOCCAL PHARYNGITIS: ICD-10-CM

## 2023-08-31 DIAGNOSIS — R41.3 MEMORY CHANGES: ICD-10-CM

## 2023-08-31 DIAGNOSIS — E11.9 TYPE 2 DIABETES MELLITUS WITHOUT COMPLICATION, WITHOUT LONG-TERM CURRENT USE OF INSULIN (MULTI): Primary | ICD-10-CM

## 2023-08-31 DIAGNOSIS — F33.41 RECURRENT MAJOR DEPRESSIVE DISORDER, IN PARTIAL REMISSION (CMS-HCC): ICD-10-CM

## 2023-08-31 PROBLEM — M47.26 OSTEOARTHRITIS OF SPINE WITH RADICULOPATHY, LUMBAR REGION: Status: ACTIVE | Noted: 2023-08-31

## 2023-08-31 PROBLEM — D51.0 PERNICIOUS ANEMIA: Status: ACTIVE | Noted: 2023-08-31

## 2023-08-31 PROBLEM — E78.5 HYPERLIPIDEMIA: Status: ACTIVE | Noted: 2023-08-31

## 2023-08-31 PROBLEM — M47.816 LUMBAR FACET ARTHROPATHY: Status: ACTIVE | Noted: 2023-08-31

## 2023-08-31 PROBLEM — I65.29 CAROTID STENOSIS: Status: ACTIVE | Noted: 2023-08-31

## 2023-08-31 PROBLEM — R26.89 BALANCE PROBLEM: Status: ACTIVE | Noted: 2023-08-31

## 2023-08-31 PROBLEM — B00.1 RECURRENT COLD SORES: Status: ACTIVE | Noted: 2023-08-31

## 2023-08-31 PROBLEM — M19.042 OSTEOARTHRITIS OF BOTH HANDS: Status: ACTIVE | Noted: 2023-08-31

## 2023-08-31 PROBLEM — R26.9 GAIT ABNORMALITY: Status: ACTIVE | Noted: 2023-08-31

## 2023-08-31 PROBLEM — F32.A DEPRESSION: Status: ACTIVE | Noted: 2023-08-31

## 2023-08-31 PROBLEM — F02.80 EARLY ONSET ALZHEIMER'S DEMENTIA WITHOUT BEHAVIORAL DISTURBANCE (MULTI): Status: ACTIVE | Noted: 2023-08-31

## 2023-08-31 PROBLEM — G30.0 EARLY ONSET ALZHEIMER'S DEMENTIA WITHOUT BEHAVIORAL DISTURBANCE (MULTI): Status: ACTIVE | Noted: 2023-08-31

## 2023-08-31 PROBLEM — M19.041 OSTEOARTHRITIS OF BOTH HANDS: Status: ACTIVE | Noted: 2023-08-31

## 2023-08-31 LAB
POC FINGERSTICK BLOOD GLUCOSE: 161 MG/DL (ref 70–100)
POC HEMOGLOBIN A1C: 6.8 % (ref 4.2–6.5)

## 2023-08-31 PROCEDURE — 99214 OFFICE O/P EST MOD 30 MIN: CPT | Performed by: FAMILY MEDICINE

## 2023-08-31 PROCEDURE — 82962 GLUCOSE BLOOD TEST: CPT | Performed by: FAMILY MEDICINE

## 2023-08-31 PROCEDURE — 3074F SYST BP LT 130 MM HG: CPT | Performed by: FAMILY MEDICINE

## 2023-08-31 PROCEDURE — 83036 HEMOGLOBIN GLYCOSYLATED A1C: CPT | Performed by: FAMILY MEDICINE

## 2023-08-31 PROCEDURE — 1036F TOBACCO NON-USER: CPT | Performed by: FAMILY MEDICINE

## 2023-08-31 PROCEDURE — 4010F ACE/ARB THERAPY RXD/TAKEN: CPT | Performed by: FAMILY MEDICINE

## 2023-08-31 PROCEDURE — 3078F DIAST BP <80 MM HG: CPT | Performed by: FAMILY MEDICINE

## 2023-08-31 PROCEDURE — 1160F RVW MEDS BY RX/DR IN RCRD: CPT | Performed by: FAMILY MEDICINE

## 2023-08-31 PROCEDURE — 1159F MED LIST DOCD IN RCRD: CPT | Performed by: FAMILY MEDICINE

## 2023-08-31 RX ORDER — FLUTICASONE PROPIONATE 50 MCG
1 SPRAY, SUSPENSION (ML) NASAL DAILY
COMMUNITY
Start: 2023-04-13

## 2023-08-31 RX ORDER — AMOXICILLIN 500 MG/1
500 TABLET, FILM COATED ORAL 3 TIMES DAILY
Qty: 90 TABLET | Refills: 0 | Status: SHIPPED | OUTPATIENT
Start: 2023-08-31 | End: 2024-02-29

## 2023-08-31 ASSESSMENT — ENCOUNTER SYMPTOMS
POLYDIPSIA: 0
ADENOPATHY: 0
SINUS PRESSURE: 0
DIZZINESS: 0
COLOR CHANGE: 0
DECREASED CONCENTRATION: 0
RECTAL PAIN: 0
DYSURIA: 0
FLANK PAIN: 0
DYSPHORIC MOOD: 0
MYALGIAS: 0
HEMATURIA: 0
CONFUSION: 0
RHINORRHEA: 0
PALPITATIONS: 0
HYPERTENSION: 1
SORE THROAT: 0
SLEEP DISTURBANCE: 0
HEADACHES: 0
ABDOMINAL DISTENTION: 0
SPEECH DIFFICULTY: 0
EYE PAIN: 0
CHEST TIGHTNESS: 0
STRIDOR: 0
ACTIVITY CHANGE: 0
DIARRHEA: 0
AGITATION: 0
COUGH: 0
NERVOUS/ANXIOUS: 0
NECK STIFFNESS: 0
CONSTIPATION: 0
ARTHRALGIAS: 0
FATIGUE: 0
PHOTOPHOBIA: 0
TROUBLE SWALLOWING: 0
CONSTITUTIONAL NEGATIVE: 1
ABDOMINAL PAIN: 0
SINUS PAIN: 0
APPETITE CHANGE: 0
SHORTNESS OF BREATH: 0
SEIZURES: 0
BLOOD IN STOOL: 0
FEVER: 0
POLYPHAGIA: 0

## 2023-08-31 NOTE — PROGRESS NOTES
Subjective   Patient ID: Raissa Puentes is a 73 y.o. female who presents for Hypertension and Diabetes.    Hypertension  This is a recurrent problem. The current episode started more than 1 year ago. The problem is unchanged. The problem is controlled. Pertinent negatives include no chest pain, headaches, palpitations or shortness of breath. There are no associated agents to hypertension. Risk factors for coronary artery disease include diabetes mellitus.   Diabetes  Pertinent negatives for hypoglycemia include no confusion, dizziness, headaches, nervousness/anxiousness, seizures or speech difficulty. Pertinent negatives for diabetes include no chest pain, no fatigue, no polydipsia, no polyphagia and no polyuria.    patient is here for follow up on hypertension and use of lisinopril 20 mg. He is not having any symptoms of problem .  She is also taking Actos 45 mg, Ozempic 0.5 mg weekly for diabetes. Some days of sugar low have occurred.     Patient's  would like refill of the amoxicillin.     Review of Systems   Constitutional: Negative.  Negative for activity change, appetite change, fatigue and fever.   HENT:  Negative for congestion, dental problem, ear discharge, ear pain, mouth sores, rhinorrhea, sinus pressure, sinus pain, sore throat, tinnitus and trouble swallowing.    Eyes:  Negative for photophobia, pain and visual disturbance.   Respiratory:  Negative for cough, chest tightness, shortness of breath and stridor.    Cardiovascular:  Negative for chest pain and palpitations.   Gastrointestinal:  Negative for abdominal distention, abdominal pain, blood in stool, constipation, diarrhea and rectal pain.   Endocrine: Negative for cold intolerance, heat intolerance, polydipsia, polyphagia and polyuria.   Genitourinary:  Negative for dysuria, flank pain, hematuria and urgency.   Musculoskeletal:  Negative for arthralgias, gait problem, myalgias and neck stiffness.   Skin:  Negative for color change and  "rash.   Allergic/Immunologic: Negative for environmental allergies and food allergies.   Neurological:  Negative for dizziness, seizures, syncope, speech difficulty and headaches.   Hematological:  Negative for adenopathy.   Psychiatric/Behavioral:  Negative for agitation, confusion, decreased concentration, dysphoric mood and sleep disturbance. The patient is not nervous/anxious.        Objective   /66 (BP Location: Left arm, Patient Position: Sitting, BP Cuff Size: Adult)   Pulse 75   Resp 16   Ht 1.651 m (5' 5\")   Wt 63.5 kg (140 lb)   SpO2 96%   BMI 23.30 kg/m²     Physical Exam  Vitals reviewed.   Constitutional:       General: She is not in acute distress.     Appearance: Normal appearance. She is normal weight. She is not ill-appearing or diaphoretic.   HENT:      Head: Normocephalic.      Right Ear: Tympanic membrane and external ear normal.      Left Ear: Tympanic membrane and external ear normal.      Nose: Nose normal. No congestion.      Mouth/Throat:      Pharynx: No posterior oropharyngeal erythema.   Eyes:      General:         Right eye: No discharge.         Left eye: No discharge.      Extraocular Movements: Extraocular movements intact.      Conjunctiva/sclera: Conjunctivae normal.      Pupils: Pupils are equal, round, and reactive to light.   Cardiovascular:      Rate and Rhythm: Normal rate and regular rhythm.      Pulses: Normal pulses.      Heart sounds: Normal heart sounds. No murmur heard.  Pulmonary:      Effort: Pulmonary effort is normal. No respiratory distress.      Breath sounds: Normal breath sounds. No wheezing or rales.   Chest:      Chest wall: No tenderness.   Abdominal:      General: Abdomen is flat. Bowel sounds are normal. There is no distension.      Palpations: There is no mass.      Tenderness: There is no abdominal tenderness. There is no guarding.   Musculoskeletal:         General: No tenderness. Normal range of motion.      Cervical back: Normal range of " motion and neck supple. No tenderness.      Right lower leg: No edema.      Left lower leg: No edema.   Skin:     General: Skin is warm and dry.      Coloration: Skin is not jaundiced.      Findings: No bruising or erythema.   Neurological:      General: No focal deficit present.      Mental Status: She is alert and oriented to person, place, and time. Mental status is at baseline.      Cranial Nerves: No cranial nerve deficit.      Sensory: No sensory deficit.      Coordination: Coordination normal.      Gait: Gait abnormal.      Comments: Recent and remote memory impairment   Psychiatric:         Mood and Affect: Mood normal.         Thought Content: Thought content normal.         Judgment: Judgment normal.         Assessment/Plan   Problem List Items Addressed This Visit       Type 2 diabetes mellitus without complication, without long-term current use of insulin (CMS/[facility]) - Primary    Relevant Medications    glipiZIDE XL (Glucotrol XL) 5 mg 24 hr tablet    Other Relevant Orders    POCT glycosylated hemoglobin (Hb A1C) manually resulted (Completed)    POCT Fingerstick Glucose manually resulted (Completed)    Depression    Hypertension    Hyperlipidemia    Memory changes     Other Visit Diagnoses       Acute streptococcal pharyngitis        Relevant Medications    amoxicillin (Amoxil) 500 mg tablet              Scribe Attestation  By signing my name below, ITanika RMA, Scribe   attest that this documentation has been prepared under the direction and in the presence of Elier Alegria DO.   Provider Attestation - Scribe documentation    All medical record entries made by the Scribe were at my direction and personally dictated by me. I have reviewed the chart and agree that the record accurately reflects my personal performance of the history, physical exam, discussion and plan.  Patient was identified as a fall risk. Risk prevention instructions provided.

## 2023-08-31 NOTE — PATIENT INSTRUCTIONS
Follow up in 3 months    Continue current medications and therapy for chronic medical conditions.    Patient was advised importance of proper diet/nutrition in addition adequate hydration. Patient was encouraged moderate exercise program to include 30 minutes daily for 5 days of the week or 150 minutes weekly. Patient will follow-up with us as scheduled.    IO A1C AND GLUCOSE TODAY     Glipizide decreased to 5mg         Ways to Help Prevent Falls at Home    Quick Tips   ? Ask for help if you need it. Most people want to help!   ? Get up slowly after sitting or laying down   ? Wear a medical alert device or keep cell phone in your pocket   ? Use night lights, especially areas near a bathroom   ? Keep the items you use often within reach on a small stool or end table   ? Use an assistive device such as walker or cane, as directed by provider/physical therapy   ? Use a non-slip mat and grab bars in your bathroom. Look for home health sections for best options     Other Areas to Focus On   ? Exercise and nutrition: Regular exercise or taking a falls prevention class are great ways improve strength and balance. Don’t forget to stay hydrated and bring a snack!   ? Medicine side effects: Some medicines can make you sleepy or dizzy, which could cause a fall. Ask your healthcare provider about the side effects your medicines could cause. Be sure to let them know if you take any vitamins or supplements as well.   ? Tripping hazards: Remove items you could trip on, such as loose mats, rugs, cords, and clutter. Wear closed toe shoes with rubber soles.   ? Health and wellness: Get regular checkups with your healthcare provider, plus routine vision and hearing screenings. Talk with your healthcare provider about:   o Your medicines and the possible side effects - bring them in a bag if that is easier!   o Problems with balance or feeling dizzy   o Ways to promote bone health, such as Vitamin D and calcium supplements   o  Questions or concerns about falling     *Ask your healthcare team if you have questions     ©[facility], 2022

## 2023-09-01 RX ORDER — GLIPIZIDE 5 MG/1
5 TABLET, FILM COATED, EXTENDED RELEASE ORAL DAILY
Qty: 90 TABLET | Refills: 1 | Status: SHIPPED | OUTPATIENT
Start: 2023-09-01 | End: 2024-02-29 | Stop reason: SDUPTHER

## 2023-11-30 ENCOUNTER — OFFICE VISIT (OUTPATIENT)
Dept: PRIMARY CARE | Facility: CLINIC | Age: 73
End: 2023-11-30
Payer: MEDICARE

## 2023-11-30 VITALS
RESPIRATION RATE: 15 BRPM | WEIGHT: 144.8 LBS | TEMPERATURE: 96.6 F | OXYGEN SATURATION: 95 % | HEIGHT: 65 IN | DIASTOLIC BLOOD PRESSURE: 58 MMHG | HEART RATE: 88 BPM | SYSTOLIC BLOOD PRESSURE: 106 MMHG | BODY MASS INDEX: 24.12 KG/M2

## 2023-11-30 DIAGNOSIS — F02.80 EARLY ONSET ALZHEIMER'S DEMENTIA WITHOUT BEHAVIORAL DISTURBANCE (MULTI): ICD-10-CM

## 2023-11-30 DIAGNOSIS — G30.0 EARLY ONSET ALZHEIMER'S DEMENTIA WITHOUT BEHAVIORAL DISTURBANCE (MULTI): ICD-10-CM

## 2023-11-30 DIAGNOSIS — E11.9 TYPE 2 DIABETES MELLITUS WITHOUT COMPLICATION, WITHOUT LONG-TERM CURRENT USE OF INSULIN (MULTI): ICD-10-CM

## 2023-11-30 DIAGNOSIS — Z00.00 MEDICARE ANNUAL WELLNESS VISIT, SUBSEQUENT: Primary | ICD-10-CM

## 2023-11-30 DIAGNOSIS — F33.41 RECURRENT MAJOR DEPRESSIVE DISORDER, IN PARTIAL REMISSION (CMS-HCC): ICD-10-CM

## 2023-11-30 DIAGNOSIS — E78.2 MIXED HYPERLIPIDEMIA: ICD-10-CM

## 2023-11-30 DIAGNOSIS — B00.1 RECURRENT COLD SORES: ICD-10-CM

## 2023-11-30 DIAGNOSIS — I10 PRIMARY HYPERTENSION: ICD-10-CM

## 2023-11-30 DIAGNOSIS — Z23 NEED FOR INFLUENZA VACCINATION: ICD-10-CM

## 2023-11-30 DIAGNOSIS — M54.50 LOW BACK PAIN, UNSPECIFIED BACK PAIN LATERALITY, UNSPECIFIED CHRONICITY, UNSPECIFIED WHETHER SCIATICA PRESENT: ICD-10-CM

## 2023-11-30 LAB
POC FINGERSTICK BLOOD GLUCOSE: 266 MG/DL (ref 70–100)
POC HEMOGLOBIN A1C: 7.9 % (ref 4.2–6.5)

## 2023-11-30 PROCEDURE — 90662 IIV NO PRSV INCREASED AG IM: CPT | Performed by: FAMILY MEDICINE

## 2023-11-30 PROCEDURE — G0439 PPPS, SUBSEQ VISIT: HCPCS | Performed by: FAMILY MEDICINE

## 2023-11-30 PROCEDURE — 83036 HEMOGLOBIN GLYCOSYLATED A1C: CPT | Performed by: FAMILY MEDICINE

## 2023-11-30 PROCEDURE — G0008 ADMIN INFLUENZA VIRUS VAC: HCPCS | Performed by: FAMILY MEDICINE

## 2023-11-30 PROCEDURE — 82962 GLUCOSE BLOOD TEST: CPT | Performed by: FAMILY MEDICINE

## 2023-11-30 RX ORDER — LOSARTAN POTASSIUM 50 MG/1
50 TABLET ORAL DAILY
Qty: 90 TABLET | Refills: 1 | Status: SHIPPED | OUTPATIENT
Start: 2023-11-30 | End: 2024-02-29 | Stop reason: SDUPTHER

## 2023-11-30 RX ORDER — BUPROPION HYDROCHLORIDE 150 MG/1
150 TABLET, EXTENDED RELEASE ORAL 2 TIMES DAILY
Qty: 180 TABLET | Refills: 1 | Status: SHIPPED | OUTPATIENT
Start: 2023-11-30 | End: 2024-02-29 | Stop reason: SDUPTHER

## 2023-11-30 RX ORDER — SIMVASTATIN 40 MG/1
40 TABLET, FILM COATED ORAL DAILY
Qty: 90 TABLET | Refills: 1 | Status: SHIPPED | OUTPATIENT
Start: 2023-11-30 | End: 2024-02-29 | Stop reason: SDUPTHER

## 2023-11-30 RX ORDER — LOSARTAN POTASSIUM 50 MG/1
50 TABLET ORAL DAILY
COMMUNITY
Start: 2023-11-27 | End: 2023-11-30 | Stop reason: SDUPTHER

## 2023-11-30 RX ORDER — MEMANTINE HYDROCHLORIDE 10 MG/1
10 TABLET ORAL 2 TIMES DAILY
Qty: 180 TABLET | Refills: 1 | Status: SHIPPED | OUTPATIENT
Start: 2023-11-30 | End: 2024-02-29 | Stop reason: SDUPTHER

## 2023-11-30 RX ORDER — DONEPEZIL HYDROCHLORIDE 10 MG/1
10 TABLET, FILM COATED ORAL 2 TIMES DAILY
Qty: 180 TABLET | Refills: 1 | Status: SHIPPED | OUTPATIENT
Start: 2023-11-30 | End: 2024-02-29 | Stop reason: SDUPTHER

## 2023-11-30 RX ORDER — PIOGLITAZONEHYDROCHLORIDE 45 MG/1
45 TABLET ORAL DAILY
Qty: 90 TABLET | Refills: 1 | Status: SHIPPED | OUTPATIENT
Start: 2023-11-30 | End: 2024-02-29 | Stop reason: SDUPTHER

## 2023-11-30 RX ORDER — SYRINGE WITH NEEDLE, 1 ML 28GX1/2"
SYRINGE, EMPTY DISPOSABLE MISCELLANEOUS
COMMUNITY
Start: 2023-09-07 | End: 2024-06-03 | Stop reason: ALTCHOICE

## 2023-11-30 RX ORDER — DICLOFENAC SODIUM 75 MG/1
75 TABLET, DELAYED RELEASE ORAL 2 TIMES DAILY
Qty: 180 TABLET | Refills: 3 | Status: SHIPPED | OUTPATIENT
Start: 2023-11-30 | End: 2024-02-29 | Stop reason: SDUPTHER

## 2023-11-30 RX ORDER — CITALOPRAM 20 MG/1
20 TABLET, FILM COATED ORAL DAILY
Qty: 90 TABLET | Refills: 1 | Status: SHIPPED | OUTPATIENT
Start: 2023-11-30 | End: 2024-02-29 | Stop reason: SDUPTHER

## 2023-11-30 RX ORDER — ACYCLOVIR 400 MG/1
400 TABLET ORAL DAILY
Qty: 90 TABLET | Refills: 1 | Status: SHIPPED | OUTPATIENT
Start: 2023-11-30 | End: 2024-06-03 | Stop reason: ALTCHOICE

## 2023-11-30 ASSESSMENT — ENCOUNTER SYMPTOMS
DIZZINESS: 0
SPEECH DIFFICULTY: 0
TROUBLE SWALLOWING: 0
OCCASIONAL FEELINGS OF UNSTEADINESS: 0
SLEEP DISTURBANCE: 0
SHORTNESS OF BREATH: 0
RHINORRHEA: 0
HEADACHES: 0
SORE THROAT: 0
DYSPHORIC MOOD: 0
NECK STIFFNESS: 0
CHEST TIGHTNESS: 0
POLYDIPSIA: 0
FLANK PAIN: 0
RECTAL PAIN: 0
DECREASED CONCENTRATION: 0
STRIDOR: 0
DEPRESSION: 0
SEIZURES: 0
SINUS PRESSURE: 0
ADENOPATHY: 0
POLYPHAGIA: 0
LOSS OF SENSATION IN FEET: 0
AGITATION: 0
FEVER: 0
ABDOMINAL PAIN: 0
ABDOMINAL DISTENTION: 0
DYSURIA: 0
CONSTITUTIONAL NEGATIVE: 1
ARTHRALGIAS: 0
DIARRHEA: 0
EYE PAIN: 0
CONSTIPATION: 0
PALPITATIONS: 0
COLOR CHANGE: 0
HEMATURIA: 0
COUGH: 0
BLOOD IN STOOL: 0
PHOTOPHOBIA: 0
CONFUSION: 1
SINUS PAIN: 0
MYALGIAS: 0
NERVOUS/ANXIOUS: 0
APPETITE CHANGE: 0
FATIGUE: 0
ACTIVITY CHANGE: 0

## 2023-11-30 ASSESSMENT — ACTIVITIES OF DAILY LIVING (ADL)
DOING_HOUSEWORK: NEEDS ASSISTANCE
MANAGING_FINANCES: NEEDS ASSISTANCE
BATHING: NEEDS ASSISTANCE
GROCERY_SHOPPING: NEEDS ASSISTANCE
DRESSING: NEEDS ASSISTANCE
TAKING_MEDICATION: NEEDS ASSISTANCE

## 2023-11-30 ASSESSMENT — PATIENT HEALTH QUESTIONNAIRE - PHQ9
SUM OF ALL RESPONSES TO PHQ9 QUESTIONS 1 AND 2: 0
1. LITTLE INTEREST OR PLEASURE IN DOING THINGS: NOT AT ALL
2. FEELING DOWN, DEPRESSED OR HOPELESS: NOT AT ALL

## 2023-11-30 NOTE — PATIENT INSTRUCTIONS
Follow up 5 days post CT cardiac score with regular follow-up visit in 3 months    Continue current medications and therapy for chronic medical conditions.    Patient was advised importance of proper diet/nutrition in addition adequate hydration. Patient was encouraged moderate exercise program to include 30 minutes daily for 5 days of the week or 150 minutes weekly. Patient will follow-up with us as scheduled.    Complete Medicare annual wellness physical/exam     Obtain fasting lipid, CMP, CBC, and TSH    CT cardiac scoring ordered    Counseled on advanced directives

## 2023-11-30 NOTE — PROGRESS NOTES
Subjective   Reason for Visit: Raissa Puentes is an 73 y.o. female here for a Medicare Wellness visit.     Past Medical, Surgical, and Family History reviewed and updated in chart.         Patient is here for medicare annual wellness visit.    Patient  states would like discuss RSV shot.    Patient  denies any other symptoms or concerns today.        Patient Care Team:  Elier Alegria DO as PCP - General (Family Medicine)  Elier Alegria DO [facility]ributed [facility]  Jaqueline Davenport RN as Care Manager (Case Management)     Review of Systems   Constitutional: Negative.  Negative for activity change, appetite change, fatigue and fever.   HENT:  Negative for congestion, dental problem, ear discharge, ear pain, mouth sores, rhinorrhea, sinus pressure, sinus pain, sore throat, tinnitus and trouble swallowing.    Eyes:  Negative for photophobia, pain and visual disturbance.   Respiratory:  Negative for cough, chest tightness, shortness of breath and stridor.    Cardiovascular:  Negative for chest pain and palpitations.   Gastrointestinal:  Negative for abdominal distention, abdominal pain, blood in stool, constipation, diarrhea and rectal pain.   Endocrine: Negative for cold intolerance, heat intolerance, polydipsia, polyphagia and polyuria.   Genitourinary:  Negative for dysuria, flank pain, hematuria and urgency.   Musculoskeletal:  Negative for arthralgias, gait problem, myalgias and neck stiffness.   Skin:  Negative for color change and rash.   Allergic/Immunologic: Negative for environmental allergies and food allergies.   Neurological:  Negative for dizziness, seizures, syncope, speech difficulty and headaches.   Hematological:  Negative for adenopathy.   Psychiatric/Behavioral:  Positive for confusion. Negative for agitation, decreased concentration, dysphoric mood and sleep disturbance. The patient is not nervous/anxious.        Objective   Vitals:  /58 (BP Location:  "Right arm, Patient Position: Sitting, BP Cuff Size: Adult)   Pulse 88   Temp 35.9 °C (96.6 °F)   Resp 15   Ht 1.651 m (5' 5\")   Wt 65.7 kg (144 lb 12.8 oz)   SpO2 95%   BMI 24.10 kg/m²       Physical Exam  Vitals reviewed.   Constitutional:       General: She is not in acute distress.     Appearance: Normal appearance. She is normal weight. She is not ill-appearing or diaphoretic.   HENT:      Head: Normocephalic.      Right Ear: Tympanic membrane and external ear normal.      Left Ear: Tympanic membrane and external ear normal.      Nose: Nose normal. No congestion.      Mouth/Throat:      Pharynx: No posterior oropharyngeal erythema.   Eyes:      General:         Right eye: No discharge.         Left eye: No discharge.      Extraocular Movements: Extraocular movements intact.      Conjunctiva/sclera: Conjunctivae normal.      Pupils: Pupils are equal, round, and reactive to light.   Cardiovascular:      Rate and Rhythm: Normal rate and regular rhythm.      Pulses: Normal pulses.      Heart sounds: Normal heart sounds. No murmur heard.  Pulmonary:      Effort: Pulmonary effort is normal. No respiratory distress.      Breath sounds: Normal breath sounds. No wheezing or rales.   Chest:      Chest wall: No tenderness.   Abdominal:      General: Abdomen is flat. Bowel sounds are normal. There is no distension.      Palpations: There is no mass.      Tenderness: There is no abdominal tenderness. There is no guarding.   Musculoskeletal:         General: No tenderness. Normal range of motion.      Cervical back: Normal range of motion and neck supple. No tenderness.      Right lower leg: No edema.      Left lower leg: No edema.   Skin:     General: Skin is dry.      Coloration: Skin is not jaundiced.      Findings: No bruising, erythema or rash.   Neurological:      Mental Status: She is alert. Mental status is at baseline. She is disoriented.      Cranial Nerves: No cranial nerve deficit.      Sensory: No sensory " deficit.      Coordination: Coordination normal.      Gait: Gait abnormal.      Comments: Recent and remote memory impairment/moderate   Psychiatric:         Mood and Affect: Mood normal.      Comments: Flat affect,         Assessment/Plan   Problem List Items Addressed This Visit       Type 2 diabetes mellitus without complication, without long-term current use of insulin (CMS/[facility])    Relevant Medications    pioglitazone (Actos) 45 mg tablet    Other Relevant Orders    POCT glycosylated hemoglobin (Hb A1C) manually resulted (Completed)    POCT Fingerstick Glucose manually resulted (Completed)    CBC and Auto Differential    Comprehensive Metabolic Panel    Depression    Relevant Medications    buPROPion SR (Wellbutrin SR) 150 mg 12 hr tablet    citalopram (CeleXA) 20 mg tablet    Other Relevant Orders    Thyroid Stimulating Hormone    Early onset Alzheimer's dementia without behavioral disturbance (CMS/[facility])    Relevant Medications    memantine (Namenda) 10 mg tablet    donepezil (Aricept) 10 mg tablet    Other Relevant Orders    Follow Up In [facility] - Care Manager    Hypertension    Relevant Medications    losartan (Cozaar) 50 mg tablet    Other Relevant Orders    CBC and Auto Differential    Comprehensive Metabolic Panel    Hyperlipidemia    Relevant Medications    simvastatin (Zocor) 40 mg tablet    Other Relevant Orders    CT cardiac scoring wo IV contrast    CBC and Auto Differential    Comprehensive Metabolic Panel    Lipid Panel    Recurrent cold sores    Relevant Medications    acyclovir (Zovirax) 400 mg tablet    Medicare annual wellness visit, subsequent - Primary     Other Visit Diagnoses       Low back pain, unspecified back pain laterality, unspecified chronicity, unspecified whether sciatica present        Relevant Medications    diclofenac (Voltaren) 75 mg EC tablet    Need for influenza vaccination        Relevant Orders    Flu vaccine, quadrivalent, high-dose, preservative free, age  65y+ (FLUZONE) (Completed)          Scribe Attestation  By signing my name below, I, Elier Alegria DO , Scriblisandra   attest that this documentation has been prepared under the direction and in the presence of Elier Alegria DO.     Provider Attestation - Scribe documentation    All medical record entries made by the Scribe were at my direction and personally dictated by me. I have reviewed the chart and agree that the record accurately reflects my personal performance of the history, physical exam, discussion and plan.

## 2023-12-01 ENCOUNTER — PATIENT OUTREACH (OUTPATIENT)
Dept: PRIMARY CARE | Facility: CLINIC | Age: 73
End: 2023-12-01
Payer: MEDICARE

## 2023-12-01 DIAGNOSIS — E11.9 TYPE 2 DIABETES MELLITUS WITHOUT COMPLICATION, WITHOUT LONG-TERM CURRENT USE OF INSULIN (MULTI): ICD-10-CM

## 2023-12-01 DIAGNOSIS — E78.2 MIXED HYPERLIPIDEMIA: ICD-10-CM

## 2023-12-01 DIAGNOSIS — F02.80 EARLY ONSET ALZHEIMER'S DEMENTIA WITHOUT BEHAVIORAL DISTURBANCE (MULTI): ICD-10-CM

## 2023-12-01 DIAGNOSIS — M54.50 LOW BACK PAIN, UNSPECIFIED BACK PAIN LATERALITY, UNSPECIFIED CHRONICITY, UNSPECIFIED WHETHER SCIATICA PRESENT: ICD-10-CM

## 2023-12-01 DIAGNOSIS — G30.0 EARLY ONSET ALZHEIMER'S DEMENTIA WITHOUT BEHAVIORAL DISTURBANCE (MULTI): ICD-10-CM

## 2023-12-01 PROCEDURE — 99490 CHRNC CARE MGMT STAFF 1ST 20: CPT | Performed by: FAMILY MEDICINE

## 2023-12-01 NOTE — PROGRESS NOTES
Introduced patient and , Kenan, to Chronic Care Management Program.    Explained that in my role as Care Manager I will:  -Be a point of contact with questions and concerns  -Focus on chronic conditions and achieving health goals  -Ensure patient is receiving all preventative care she is due for    Verbal consent given to enroll.    My contact info was provided for any needs that may arise.    Aware I will call to check in in the next 3-4 weeks.    Patient's main concerns at this time are:   Needing set up with rolator with brakes  Concerns with memory

## 2023-12-14 ENCOUNTER — ANCILLARY PROCEDURE (OUTPATIENT)
Dept: RADIOLOGY | Facility: CLINIC | Age: 73
End: 2023-12-14
Payer: MEDICARE

## 2023-12-14 DIAGNOSIS — E78.2 MIXED HYPERLIPIDEMIA: ICD-10-CM

## 2023-12-14 PROCEDURE — 75571 CT HRT W/O DYE W/CA TEST: CPT

## 2024-01-02 ENCOUNTER — OFFICE VISIT (OUTPATIENT)
Dept: NEUROLOGY | Age: 74
End: 2024-01-02
Payer: MEDICARE

## 2024-01-02 VITALS
DIASTOLIC BLOOD PRESSURE: 60 MMHG | BODY MASS INDEX: 23.76 KG/M2 | WEIGHT: 145 LBS | HEART RATE: 78 BPM | SYSTOLIC BLOOD PRESSURE: 120 MMHG

## 2024-01-02 DIAGNOSIS — G30.0 PRIMARY DEGENERATIVE DEMENTIA OF THE ALZHEIMER TYPE, PRESENILE ONSET, UNCOMPLICATED (HCC): ICD-10-CM

## 2024-01-02 DIAGNOSIS — G30.0 DEMENTIA OF THE ALZHEIMER'S TYPE, WITH EARLY ONSET, WITH DEPRESSIVE MOOD (HCC): Primary | ICD-10-CM

## 2024-01-02 DIAGNOSIS — F02.83 DEMENTIA OF THE ALZHEIMER'S TYPE, WITH EARLY ONSET, WITH DEPRESSIVE MOOD (HCC): Primary | ICD-10-CM

## 2024-01-02 DIAGNOSIS — F32.9 REACTIVE DEPRESSION: ICD-10-CM

## 2024-01-02 DIAGNOSIS — F02.80 PRIMARY DEGENERATIVE DEMENTIA OF THE ALZHEIMER TYPE, PRESENILE ONSET, UNCOMPLICATED (HCC): ICD-10-CM
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
## 2024-01-02 PROBLEM — G93.5 CHIARI MALFORMATION TYPE I (HCC): Status: ACTIVE | Noted: 2024-01-02

## 2024-01-02 PROCEDURE — G8399 PT W/DXA RESULTS DOCUMENT: HCPCS | Performed by: PSYCHIATRY & NEUROLOGY

## 2024-01-02 PROCEDURE — G8427 DOCREV CUR MEDS BY ELIG CLIN: HCPCS | Performed by: PSYCHIATRY & NEUROLOGY

## 2024-01-02 PROCEDURE — 1123F ACP DISCUSS/DSCN MKR DOCD: CPT | Performed by: PSYCHIATRY & NEUROLOGY

## 2024-01-02 PROCEDURE — 1090F PRES/ABSN URINE INCON ASSESS: CPT | Performed by: PSYCHIATRY & NEUROLOGY

## 2024-01-02 PROCEDURE — G8484 FLU IMMUNIZE NO ADMIN: HCPCS | Performed by: PSYCHIATRY & NEUROLOGY

## 2024-01-02 PROCEDURE — G8420 CALC BMI NORM PARAMETERS: HCPCS | Performed by: PSYCHIATRY & NEUROLOGY

## 2024-01-02 PROCEDURE — 99213 OFFICE O/P EST LOW 20 MIN: CPT | Performed by: PSYCHIATRY & NEUROLOGY

## 2024-01-02 PROCEDURE — 3017F COLORECTAL CA SCREEN DOC REV: CPT | Performed by: PSYCHIATRY & NEUROLOGY

## 2024-01-02 PROCEDURE — 1036F TOBACCO NON-USER: CPT | Performed by: PSYCHIATRY & NEUROLOGY

## 2024-01-02 PROCEDURE — 3074F SYST BP LT 130 MM HG: CPT | Performed by: PSYCHIATRY & NEUROLOGY

## 2024-01-02 PROCEDURE — 3078F DIAST BP <80 MM HG: CPT | Performed by: PSYCHIATRY & NEUROLOGY

## 2024-01-02 RX ORDER — CITALOPRAM 20 MG/1
20 TABLET ORAL DAILY
Qty: 90 TABLET | Refills: 1 | Status: SHIPPED | OUTPATIENT
Start: 2024-01-02

## 2024-01-02 RX ORDER — DONEPEZIL HYDROCHLORIDE 10 MG/1
TABLET, FILM COATED ORAL
Qty: 180 TABLET | Refills: 3 | Status: SHIPPED | OUTPATIENT
Start: 2024-01-02

## 2024-01-02 ASSESSMENT — ENCOUNTER SYMPTOMS
VOMITING: 0
PHOTOPHOBIA: 0
COLOR CHANGE: 0
CHOKING: 0
SHORTNESS OF BREATH: 0
NAUSEA: 0
BACK PAIN: 0
TROUBLE SWALLOWING: 0

## 2024-01-02 NOTE — PROGRESS NOTES
sign absent on the right side. Babinski sign absent on the left side.   Psychiatric:         Mood and Affect: Mood normal.     Patient has a central gait ataxia and is disoriented.  No parkinsonian features are noted    [facility] DIGITAL SCREEN BILATERAL    Result Date: 9/12/2017  EXAMINATION: [facility] DIGITAL SCREEN BILATERAL CLINICAL HISTORY:Z12.31 Screening mammogram, encounter for ICD10 COMPARISON: August 6, 2015 FINDINGS: 2-D images were acquired. Breast parenchyma is heterogeneously dense and slightly asymmetric. The central left breast remains denser than the right but stable and unchanged from August 6, 2015. No developing mass or architectural distortion. Benign dense coarse calcifications in both breasts remain stable. No mammographic criteria for malignancy. No change from previous study. CAD analysis was performed and used in the interpretation.     BI-RADS 2. Board Certified Radiologists.  Accredited by the ACR and FDA. MAMMOGRAPHY IS VERY IMPORTANT TO YOUR HEALTH.  THE AMERICAN CANCER SOCIETY GUIDELINES RECOMMEND THAT WOMEN 40 YEARS OF AGE AND OLDER SHOULD HAVE A MAMMOGRAM EVERY YEAR. A REMINDER LETTER WILL BE SENT AT THE APPROPRIATE TIME.  THIS FACILITY UTILIZES A REMINDER SYSTEM TO ENSURE ALL PATIENTS RECEIVE REMINDER NOTIFICATIONS AT THE APPROPRIATE TIME BASED ON THE RECOMMENDATIONS OF THIS EXAM. THIS INCLUDES REMINDERS FOR ROUTINE  SCREENING MAMMOGRAMS, DIAGNOSTIC MAMMOGRAMS IN WHICH THE PATIENT IS ASKED TO RETURN FOR ADDITIONAL VIEWS, OR OTHER BREAST IMAGING INTERVENTIONS WHEN APPROPRIATE. THE PATIENT WILL BE PLACED IN THE APPROPRIATE REMINDER SYSTEM INCLUDING A REMINDER AT THE APPROPRIATE TIME FOR ANY PENDING ADDITIONAL VIEWS.       Lab Results   Component Value Date/Time    WBC 6.9 05/22/2018 03:37 PM    RBC 4.00 05/22/2018 03:37 PM    HGB 12.5 05/22/2018 03:37 PM    HCT 37.7 05/22/2018 03:37 PM    MCV 94.4 05/22/2018 03:37 PM    MCH 31.2 05/22/2018 03:37 PM    MCHC 33.1 05/22/2018 03:37 PM    RDW 12.9

## 2024-01-05 ENCOUNTER — PATIENT OUTREACH (OUTPATIENT)
Dept: PRIMARY CARE | Facility: CLINIC | Age: 74
End: 2024-01-05
Payer: MEDICARE

## 2024-01-05 DIAGNOSIS — E11.9 TYPE 2 DIABETES MELLITUS WITHOUT COMPLICATION, WITHOUT LONG-TERM CURRENT USE OF INSULIN (MULTI): ICD-10-CM

## 2024-01-05 DIAGNOSIS — M54.50 LOW BACK PAIN, UNSPECIFIED BACK PAIN LATERALITY, UNSPECIFIED CHRONICITY, UNSPECIFIED WHETHER SCIATICA PRESENT: ICD-10-CM

## 2024-01-05 DIAGNOSIS — E78.2 MIXED HYPERLIPIDEMIA: ICD-10-CM

## 2024-01-05 DIAGNOSIS — R26.89 IMBALANCE: ICD-10-CM

## 2024-01-05 DIAGNOSIS — G30.0 EARLY ONSET ALZHEIMER'S DEMENTIA WITHOUT BEHAVIORAL DISTURBANCE (MULTI): ICD-10-CM

## 2024-01-05 DIAGNOSIS — F33.41 RECURRENT MAJOR DEPRESSIVE DISORDER, IN PARTIAL REMISSION (CMS-HCC): ICD-10-CM

## 2024-01-05 DIAGNOSIS — F02.80 EARLY ONSET ALZHEIMER'S DEMENTIA WITHOUT BEHAVIORAL DISTURBANCE (MULTI): ICD-10-CM

## 2024-01-05 PROCEDURE — 99490 CHRNC CARE MGMT STAFF 1ST 20: CPT | Performed by: FAMILY MEDICINE

## 2024-01-05 NOTE — PROGRESS NOTES
Patient doing well   She would like to acquire walker with breaks  Unsure if insurance covers DME  Agreed we will try [facility] in [place]  We also discussed other good resources  For acquiring equipment secondhand    Reviewed CT Cardiac scores    No other concerns at this time

## 2024-02-12 ENCOUNTER — PATIENT OUTREACH (OUTPATIENT)
Dept: PRIMARY CARE | Facility: CLINIC | Age: 74
End: 2024-02-12
Payer: MEDICARE

## 2024-02-12 DIAGNOSIS — E11.9 TYPE 2 DIABETES MELLITUS WITHOUT COMPLICATION, WITHOUT LONG-TERM CURRENT USE OF INSULIN (MULTI): ICD-10-CM

## 2024-02-12 DIAGNOSIS — G30.0 EARLY ONSET ALZHEIMER'S DEMENTIA WITHOUT BEHAVIORAL DISTURBANCE (MULTI): ICD-10-CM

## 2024-02-12 DIAGNOSIS — F02.80 EARLY ONSET ALZHEIMER'S DEMENTIA WITHOUT BEHAVIORAL DISTURBANCE (MULTI): ICD-10-CM

## 2024-02-12 DIAGNOSIS — F33.41 RECURRENT MAJOR DEPRESSIVE DISORDER, IN PARTIAL REMISSION (CMS-HCC): ICD-10-CM

## 2024-02-12 PROCEDURE — 99490 CHRNC CARE MGMT STAFF 1ST 20: CPT | Performed by: FAMILY MEDICINE

## 2024-02-12 NOTE — PROGRESS NOTES
Spoke with  Kenan Haji is doing about the same  Was able to get rotator with seat  This was covered by insurance    Support from daughter who comes over daily 5-6 hours  Confirmed appointment on 2/29 2pm

## 2024-02-29 ENCOUNTER — OFFICE VISIT (OUTPATIENT)
Dept: PRIMARY CARE | Facility: CLINIC | Age: 74
End: 2024-02-29
Payer: MEDICARE

## 2024-02-29 VITALS
TEMPERATURE: 98 F | RESPIRATION RATE: 16 BRPM | HEART RATE: 90 BPM | DIASTOLIC BLOOD PRESSURE: 58 MMHG | BODY MASS INDEX: 23.99 KG/M2 | SYSTOLIC BLOOD PRESSURE: 108 MMHG | OXYGEN SATURATION: 95 % | HEIGHT: 65 IN | WEIGHT: 144 LBS

## 2024-02-29 DIAGNOSIS — J02.0 ACUTE STREPTOCOCCAL PHARYNGITIS: ICD-10-CM

## 2024-02-29 DIAGNOSIS — E11.9 TYPE 2 DIABETES MELLITUS WITHOUT COMPLICATION, WITHOUT LONG-TERM CURRENT USE OF INSULIN (MULTI): Primary | ICD-10-CM

## 2024-02-29 DIAGNOSIS — G30.0 EARLY ONSET ALZHEIMER'S DEMENTIA WITHOUT BEHAVIORAL DISTURBANCE (MULTI): ICD-10-CM

## 2024-02-29 DIAGNOSIS — M54.50 LOW BACK PAIN, UNSPECIFIED BACK PAIN LATERALITY, UNSPECIFIED CHRONICITY, UNSPECIFIED WHETHER SCIATICA PRESENT: ICD-10-CM

## 2024-02-29 DIAGNOSIS — E78.2 MIXED HYPERLIPIDEMIA: ICD-10-CM

## 2024-02-29 DIAGNOSIS — I10 PRIMARY HYPERTENSION: ICD-10-CM

## 2024-02-29 DIAGNOSIS — F02.80 EARLY ONSET ALZHEIMER'S DEMENTIA WITHOUT BEHAVIORAL DISTURBANCE (MULTI): ICD-10-CM

## 2024-02-29 DIAGNOSIS — F33.41 RECURRENT MAJOR DEPRESSIVE DISORDER, IN PARTIAL REMISSION (CMS-HCC): ICD-10-CM

## 2024-02-29 LAB
POC FINGERSTICK BLOOD GLUCOSE: 225 MG/DL (ref 70–100)
POC HEMOGLOBIN A1C: 7.5 % (ref 4.2–6.5)

## 2024-02-29 PROCEDURE — 83036 HEMOGLOBIN GLYCOSYLATED A1C: CPT | Performed by: FAMILY MEDICINE

## 2024-02-29 PROCEDURE — 82962 GLUCOSE BLOOD TEST: CPT | Performed by: FAMILY MEDICINE

## 2024-02-29 PROCEDURE — 99214 OFFICE O/P EST MOD 30 MIN: CPT | Performed by: FAMILY MEDICINE

## 2024-02-29 RX ORDER — DONEPEZIL HYDROCHLORIDE 10 MG/1
10 TABLET, FILM COATED ORAL 2 TIMES DAILY
Qty: 180 TABLET | Refills: 1 | Status: SHIPPED | OUTPATIENT
Start: 2024-02-29

## 2024-02-29 RX ORDER — DICLOFENAC SODIUM 75 MG/1
75 TABLET, DELAYED RELEASE ORAL 2 TIMES DAILY
Qty: 180 TABLET | Refills: 3 | Status: SHIPPED | OUTPATIENT
Start: 2024-02-29 | End: 2024-06-03 | Stop reason: ALTCHOICE

## 2024-02-29 RX ORDER — SIMVASTATIN 40 MG/1
40 TABLET, FILM COATED ORAL DAILY
Qty: 90 TABLET | Refills: 1 | Status: SHIPPED | OUTPATIENT
Start: 2024-02-29

## 2024-02-29 RX ORDER — BUPROPION HYDROCHLORIDE 150 MG/1
150 TABLET, EXTENDED RELEASE ORAL 2 TIMES DAILY
Qty: 180 TABLET | Refills: 1 | Status: SHIPPED | OUTPATIENT
Start: 2024-02-29

## 2024-02-29 RX ORDER — CITALOPRAM 20 MG/1
20 TABLET, FILM COATED ORAL DAILY
Qty: 90 TABLET | Refills: 1 | Status: SHIPPED | OUTPATIENT
Start: 2024-02-29

## 2024-02-29 RX ORDER — AMOXICILLIN 500 MG/1
500 TABLET, FILM COATED ORAL 3 TIMES DAILY
Qty: 30 TABLET | Refills: 0 | Status: SHIPPED | OUTPATIENT
Start: 2024-02-29 | End: 2024-05-30 | Stop reason: WASHOUT

## 2024-02-29 RX ORDER — GLIPIZIDE 5 MG/1
5 TABLET, FILM COATED, EXTENDED RELEASE ORAL DAILY
Qty: 90 TABLET | Refills: 1 | Status: SHIPPED | OUTPATIENT
Start: 2024-02-29 | End: 2024-06-03 | Stop reason: ALTCHOICE

## 2024-02-29 RX ORDER — LOSARTAN POTASSIUM 50 MG/1
50 TABLET ORAL DAILY
Qty: 90 TABLET | Refills: 1 | Status: SHIPPED | OUTPATIENT
Start: 2024-02-29 | End: 2024-05-30 | Stop reason: SDUPTHER

## 2024-02-29 RX ORDER — MEMANTINE HYDROCHLORIDE 10 MG/1
10 TABLET ORAL 2 TIMES DAILY
Qty: 180 TABLET | Refills: 1 | Status: SHIPPED | OUTPATIENT
Start: 2024-02-29

## 2024-02-29 RX ORDER — PIOGLITAZONEHYDROCHLORIDE 45 MG/1
45 TABLET ORAL DAILY
Qty: 90 TABLET | Refills: 1 | Status: SHIPPED | OUTPATIENT
Start: 2024-02-29

## 2024-02-29 ASSESSMENT — ENCOUNTER SYMPTOMS
ABDOMINAL PAIN: 0
BLOOD IN STOOL: 0
DIZZINESS: 0
RECTAL PAIN: 0
APPETITE CHANGE: 0
CONFUSION: 0
FATIGUE: 0
ABDOMINAL DISTENTION: 0
ACTIVITY CHANGE: 0
DYSURIA: 0
SORE THROAT: 0
NERVOUS/ANXIOUS: 0
DECREASED CONCENTRATION: 0
POLYDIPSIA: 0
PHOTOPHOBIA: 0
AGITATION: 0
SPEECH DIFFICULTY: 0
MYALGIAS: 0
NECK STIFFNESS: 0
DYSPHORIC MOOD: 0
HEMATURIA: 0
POLYPHAGIA: 0
HEADACHES: 0
SEIZURES: 0
CONSTITUTIONAL NEGATIVE: 1
PALPITATIONS: 0
SINUS PRESSURE: 0
DIARRHEA: 0
EYE PAIN: 0
CHEST TIGHTNESS: 0
SHORTNESS OF BREATH: 0
TROUBLE SWALLOWING: 0
ADENOPATHY: 0
SINUS PAIN: 0
FEVER: 0
FLANK PAIN: 0
RHINORRHEA: 0
STRIDOR: 0
COLOR CHANGE: 0
SLEEP DISTURBANCE: 0
ARTHRALGIAS: 0
COUGH: 0
CONSTIPATION: 0

## 2024-02-29 NOTE — PATIENT INSTRUCTIONS
Follow up in 3 months      Continue current medications and therapy for chronic medical conditions.    Patient was advised importance of proper diet/nutrition in addition adequate hydration. Patient was encouraged moderate exercise program to include 30 minutes daily for 5 days of the week or 150 minutes weekly. Patient will follow-up with us as scheduled.    Glucometer ordered     IO A1C and Glucose today     Complete fasting labs ordered in November

## 2024-02-29 NOTE — PROGRESS NOTES
Subjective   Patient ID: Raissa Puentes is a 73 y.o. female who presents for Diabetes.    Patient daughter denies any symptoms or concerns today.    Diabetes  She presents for her follow-up diabetic visit. She has type 2 diabetes mellitus. Pertinent negatives for hypoglycemia include no confusion, dizziness, headaches, nervousness/anxiousness, seizures or speech difficulty. Pertinent negatives for diabetes include no chest pain, no fatigue, no polydipsia, no polyphagia and no polyuria. There are no hypoglycemic complications. There are no diabetic complications.        Review of Systems   Constitutional: Negative.  Negative for activity change, appetite change, fatigue and fever.   HENT:  Negative for congestion, dental problem, ear discharge, ear pain, mouth sores, rhinorrhea, sinus pressure, sinus pain, sore throat, tinnitus and trouble swallowing.    Eyes:  Negative for photophobia, pain and visual disturbance.   Respiratory:  Negative for cough, chest tightness, shortness of breath and stridor.    Cardiovascular:  Negative for chest pain and palpitations.   Gastrointestinal:  Negative for abdominal distention, abdominal pain, blood in stool, constipation, diarrhea and rectal pain.   Endocrine: Negative for cold intolerance, heat intolerance, polydipsia, polyphagia and polyuria.   Genitourinary:  Negative for dysuria, flank pain, hematuria and urgency.   Musculoskeletal:  Negative for arthralgias, gait problem, myalgias and neck stiffness.   Skin:  Negative for color change and rash.   Allergic/Immunologic: Negative for environmental allergies and food allergies.   Neurological:  Negative for dizziness, seizures, syncope, speech difficulty and headaches.   Hematological:  Negative for adenopathy.   Psychiatric/Behavioral:  Negative for agitation, confusion, decreased concentration, dysphoric mood and sleep disturbance. The patient is not nervous/anxious.        Objective   /58 (BP Location: Right arm,  "Patient Position: Sitting, BP Cuff Size: Adult)   Pulse 90   Temp 36.7 °C (98 °F)   Resp 16   Ht 1.651 m (5' 5\")   Wt 65.3 kg (144 lb)   SpO2 95%   BMI 23.96 kg/m²     Physical Exam  Vitals reviewed.   Constitutional:       General: She is not in acute distress.     Appearance: Normal appearance. She is normal weight. She is not ill-appearing or diaphoretic.   HENT:      Head: Normocephalic.      Right Ear: Tympanic membrane and external ear normal.      Left Ear: Tympanic membrane and external ear normal.      Nose: Nose normal. No congestion.      Mouth/Throat:      Pharynx: No posterior oropharyngeal erythema.   Eyes:      General:         Right eye: No discharge.         Left eye: No discharge.      Extraocular Movements: Extraocular movements intact.      Conjunctiva/sclera: Conjunctivae normal.      Pupils: Pupils are equal, round, and reactive to light.   Cardiovascular:      Rate and Rhythm: Normal rate and regular rhythm.      Pulses: Normal pulses.      Heart sounds: Normal heart sounds. No murmur heard.  Pulmonary:      Effort: Pulmonary effort is normal. No respiratory distress.      Breath sounds: Normal breath sounds. No wheezing or rales.   Chest:      Chest wall: No tenderness.   Abdominal:      General: Abdomen is flat. Bowel sounds are normal. There is no distension.      Palpations: There is no mass.      Tenderness: There is no abdominal tenderness. There is no guarding.   Musculoskeletal:         General: No tenderness. Normal range of motion.      Cervical back: Normal range of motion and neck supple. No tenderness.      Right lower leg: No edema.      Left lower leg: No edema.   Skin:     General: Skin is dry.      Coloration: Skin is not jaundiced.      Findings: No bruising, erythema or rash.   Neurological:      General: No focal deficit present.      Mental Status: She is alert and oriented to person, place, and time. Mental status is at baseline.      Cranial Nerves: No cranial " nerve deficit.      Sensory: No sensory deficit.      Coordination: Coordination normal.      Gait: Gait normal.   Psychiatric:         Mood and Affect: Mood normal.         Thought Content: Thought content normal.         Judgment: Judgment normal.         Assessment/Plan   Problem List Items Addressed This Visit             ICD-10-CM    Type 2 diabetes mellitus without complication, without long-term current use of insulin (CMS/[facility]) - Primary E11.9    Relevant Medications    semaglutide 0.25 mg or 0.5 mg (2 mg/3 mL) pen injector    pioglitazone (Actos) 45 mg tablet    glipiZIDE XL (Glucotrol XL) 5 mg 24 hr tablet    blood sugar diagnostic (FreeStyle Test) strip    lancets misc    Blood glucose monitoring meter kit kit    Other Relevant Orders    POCT Fingerstick Glucose manually resulted (Completed)    POCT glycosylated hemoglobin (Hb A1C) manually resulted (Completed)    Depression F32.A    Relevant Medications    citalopram (CeleXA) 20 mg tablet    buPROPion SR (Wellbutrin SR) 150 mg 12 hr tablet    Early onset Alzheimer's dementia without behavioral disturbance (CMS/[facility]) G30.0, F02.80    Relevant Medications    memantine (Namenda) 10 mg tablet    donepezil (Aricept) 10 mg tablet    Hypertension I10    Relevant Medications    losartan (Cozaar) 50 mg tablet    Hyperlipidemia E78.5    Relevant Medications    simvastatin (Zocor) 40 mg tablet    Low back pain M54.50    Relevant Medications    diclofenac (Voltaren) 75 mg EC tablet         Scribe Attestation  By signing my name below, ITanika RMA , [place]   attest that this documentation has been prepared under the direction and in the presence of Elier Alegria DO.   Provider Attestation - Scribe documentation    All medical record entries made by the Scribe were at my direction and personally dictated by me. I have reviewed the chart and agree that the record accurately reflects my personal performance of the history, physical exam, discussion  and plan.

## 2024-03-01 RX ORDER — INSULIN PUMP SYRINGE, 3 ML
EACH MISCELLANEOUS
Qty: 1 EACH | Refills: 0 | Status: SHIPPED | OUTPATIENT
Start: 2024-03-01

## 2024-03-01 RX ORDER — BLOOD SUGAR DIAGNOSTIC
STRIP MISCELLANEOUS
Qty: 100 STRIP | Refills: 1 | Status: SHIPPED | OUTPATIENT
Start: 2024-03-01

## 2024-03-01 RX ORDER — LANCETS
EACH MISCELLANEOUS
Qty: 100 EACH | Refills: 1 | Status: SHIPPED | OUTPATIENT
Start: 2024-03-01

## 2024-03-22 ENCOUNTER — PATIENT OUTREACH (OUTPATIENT)
Dept: PRIMARY CARE | Facility: CLINIC | Age: 74
End: 2024-03-22
Payer: MEDICARE

## 2024-03-22 DIAGNOSIS — F02.80 EARLY ONSET ALZHEIMER'S DEMENTIA WITHOUT BEHAVIORAL DISTURBANCE (MULTI): ICD-10-CM

## 2024-03-22 DIAGNOSIS — E11.9 TYPE 2 DIABETES MELLITUS WITHOUT COMPLICATION, WITHOUT LONG-TERM CURRENT USE OF INSULIN (MULTI): ICD-10-CM

## 2024-03-22 DIAGNOSIS — G30.0 EARLY ONSET ALZHEIMER'S DEMENTIA WITHOUT BEHAVIORAL DISTURBANCE (MULTI): ICD-10-CM

## 2024-03-22 DIAGNOSIS — I10 PRIMARY HYPERTENSION: ICD-10-CM

## 2024-03-22 PROCEDURE — 99490 CHRNC CARE MGMT STAFF 1ST 20: CPT | Performed by: FAMILY MEDICINE

## 2024-03-22 NOTE — PROGRESS NOTES
Doing about the same  Continues ozempic  Very expensive- price has gone up to $250/month  Currently rx is [facility]  We discussed him calling [facility] to see if the price would be more affordable.  He is going to call them to check.  He will reach out if he runs into any other difficulties.    Not checking blood sugars at home.  He thinks supplies are out of date.  Explained that [facility] received new rx.  He will consider.

## 2024-04-26 ENCOUNTER — PATIENT OUTREACH (OUTPATIENT)
Dept: PRIMARY CARE | Facility: CLINIC | Age: 74
End: 2024-04-26
Payer: MEDICARE

## 2024-04-26 DIAGNOSIS — E11.9 TYPE 2 DIABETES MELLITUS WITHOUT COMPLICATION, WITHOUT LONG-TERM CURRENT USE OF INSULIN (MULTI): ICD-10-CM

## 2024-04-26 DIAGNOSIS — F02.80 EARLY ONSET ALZHEIMER'S DEMENTIA WITHOUT BEHAVIORAL DISTURBANCE (MULTI): ICD-10-CM

## 2024-04-26 DIAGNOSIS — G30.0 EARLY ONSET ALZHEIMER'S DEMENTIA WITHOUT BEHAVIORAL DISTURBANCE (MULTI): ICD-10-CM

## 2024-04-26 PROCEDURE — 99490 CHRNC CARE MGMT STAFF 1ST 20: CPT | Performed by: FAMILY MEDICINE

## 2024-04-26 NOTE — PROGRESS NOTES
Spoke with Kenan and daughter Taylor  Typically checking blood sugar a few times per day  140-170 typical fasting blood sugar  Reviewed meds for DM Type 2  Taking 0.25mg Ozempic but too expensive to continue  Additionally, Amando has difficulty swallowing medications  Usually ends up sucking them for awhile  Taylor has tried crushing them up  But she understands some are coated   And if they taste poorly Amando will not take  Pharmacy referral formatted

## 2024-05-21 ENCOUNTER — OFFICE VISIT (OUTPATIENT)
Dept: PRIMARY CARE | Facility: CLINIC | Age: 74
End: 2024-05-21
Payer: MEDICARE

## 2024-05-21 VITALS
BODY MASS INDEX: 23.99 KG/M2 | RESPIRATION RATE: 16 BRPM | DIASTOLIC BLOOD PRESSURE: 57 MMHG | TEMPERATURE: 97.3 F | SYSTOLIC BLOOD PRESSURE: 107 MMHG | HEIGHT: 65 IN | WEIGHT: 144 LBS | OXYGEN SATURATION: 96 % | HEART RATE: 93 BPM

## 2024-05-21 DIAGNOSIS — H44.003 INFECTION OF BOTH EYES: ICD-10-CM

## 2024-05-21 DIAGNOSIS — H10.33 ACUTE BACTERIAL CONJUNCTIVITIS OF BOTH EYES: Primary | ICD-10-CM

## 2024-05-21 DIAGNOSIS — B37.9 YEAST INFECTION: ICD-10-CM

## 2024-05-21 DIAGNOSIS — N89.8 VAGINAL IRRITATION: ICD-10-CM

## 2024-05-21 DIAGNOSIS — H57.89 REDNESS AND DISCHARGE OF EYE: ICD-10-CM

## 2024-05-21 DIAGNOSIS — N89.8 VAGINAL ITCHING: ICD-10-CM

## 2024-05-21 DIAGNOSIS — N89.8 VAGINAL DISCHARGE: ICD-10-CM

## 2024-05-21 DIAGNOSIS — N76.0 BACTERIAL VAGINOSIS: ICD-10-CM

## 2024-05-21 DIAGNOSIS — B96.89 BACTERIAL VAGINOSIS: ICD-10-CM

## 2024-05-21 PROCEDURE — 3074F SYST BP LT 130 MM HG: CPT | Performed by: NURSE PRACTITIONER

## 2024-05-21 PROCEDURE — 4010F ACE/ARB THERAPY RXD/TAKEN: CPT | Performed by: NURSE PRACTITIONER

## 2024-05-21 PROCEDURE — 1160F RVW MEDS BY RX/DR IN RCRD: CPT | Performed by: NURSE PRACTITIONER

## 2024-05-21 PROCEDURE — 3078F DIAST BP <80 MM HG: CPT | Performed by: NURSE PRACTITIONER

## 2024-05-21 PROCEDURE — 1036F TOBACCO NON-USER: CPT | Performed by: NURSE PRACTITIONER

## 2024-05-21 PROCEDURE — 99213 OFFICE O/P EST LOW 20 MIN: CPT | Performed by: NURSE PRACTITIONER

## 2024-05-21 PROCEDURE — 1159F MED LIST DOCD IN RCRD: CPT | Performed by: NURSE PRACTITIONER

## 2024-05-21 RX ORDER — TOBRAMYCIN 3 MG/ML
1 SOLUTION/ DROPS OPHTHALMIC EVERY 6 HOURS
Qty: 5 ML | Refills: 0 | Status: SHIPPED | OUTPATIENT
Start: 2024-05-21 | End: 2024-05-30 | Stop reason: WASHOUT

## 2024-05-21 RX ORDER — FLUCONAZOLE 150 MG/1
150 TABLET ORAL
Qty: 2 TABLET | Refills: 0 | Status: SHIPPED | OUTPATIENT
Start: 2024-05-26 | End: 2024-06-03 | Stop reason: ALTCHOICE

## 2024-05-21 RX ORDER — METRONIDAZOLE 500 MG/1
500 TABLET ORAL 2 TIMES DAILY
Qty: 14 TABLET | Refills: 0 | Status: SHIPPED | OUTPATIENT
Start: 2024-05-21 | End: 2024-05-30 | Stop reason: WASHOUT

## 2024-05-21 NOTE — PROGRESS NOTES
Subjective   Patient ID: Raissa Puentes is a 73 y.o. female who is with complaints of:  Redness, irritation, and discharge on both eyes.  Yellow, smelly vaginal discharge.    HPI  Patient is a 73 y.o. female who CONSULTED AT [facility] today. Patient is with her caregiver who helped provide information for HPI and PE.   Patient is with  with complaints of redness, irritation, and discharge on both of her eyes (R>L). Symptoms started yesterday after exposure to her granddaughter who has pink eye. she denies trauma/injury to the eye, use of contact lens. she states there were some yellow mucoid discharge that became crusty today. She denies having any blurring of vision, visual floaters, double vision, nor change in visual acuity. she denies fever, nausea, vomiting, headache, nor any nasal congestion nor discharge.     She is also with complaint of vaginal itching and irritation which started 7 days ago. This was accompanied by yellow to yellow green vaginal discharge that has fishy / foul smell. She states she had bacterial vaginosis before and these were the same symptoms. She denies any urinary complaints. She denies any fever nor chills. She has not taken any medication yet. She is not sure if she has yeast infection as well.     Review of Systems  General: no weight loss, generally healthy, no fatigue  Head:  no headaches / sinus pain, no vertigo, no injury  Eyes: (+) redness, irritation, and discharge on both eyes (R>L), no diplopia, no pain,   Ears: no change in hearing, no tinnitus, no bleeding, no vertigo  Mouth:  no dental difficulties, no gingival bleeding, no sore throat, no loss of sense of taste  Nose: no congestion, no  discharge, no bleeding, no obstruction, no loss of sense of smell  Neck: no stiffness, no pain, no tenderness, no masses, no bruit  Pulmonary: no dyspnea, no wheezing, no hemoptysis, no cough  Cardiovascular: no chest pain, no palpitations, no syncope, no  orthopnea  Gastrointestinal: no change in appetite, no dysphagia, no abdominal pains, no diarrhea, no emesis, no melena  Genito Urinary: (+) vaginal itching, (+) vaginal irritation, (+) yellow discharge, no dysuria, no urinary urgency, no nocturia, no incontinence, no change in nature of urine  Musculoskeletal: no muscle ache, no joint pain, no limitation of range of motion, no paresthesia, no numbness  Constitutional: no fever, no chills, no night sweats    Objective   Physical Exam  General: ambulatory, in no acute distress  Head: normocephalic, no lesions  Eyes: pink palpebral conjunctiva, anicteric sclerae, PERRLA, EOM's full, RIGHT AND LEFT EYES: (+) subconjunctival injection, (+) redness, (+) tearing, (+) yellow mucoid discharge, no photophobia  Ears: clear external auditory canals, no ear discharge, no bleeding from the ears, tympanic membrane intact  Nose: nasal mucosa normal, no nasal discharge, no bleeding, no obstruction  Throat: clear, no exudate, no lesions  Neck: supple, no masses, no bruits  : declined    Assessment/Plan   Problem List Items Addressed This Visit    None  Visit Diagnoses         Codes    Acute bacterial conjunctivitis of both eyes    -  Primary H10.33    Relevant Medications    tobramycin (Tobrex) 0.3 % ophthalmic solution    Infection of both eyes     H44.003    Relevant Medications    tobramycin (Tobrex) 0.3 % ophthalmic solution    Redness and discharge of eye     H57.89    Relevant Medications    tobramycin (Tobrex) 0.3 % ophthalmic solution    Bacterial vaginosis     N76.0, B96.89    Relevant Medications    metroNIDAZOLE (Flagyl) 500 mg tablet    Vaginal discharge     N89.8    Relevant Medications    metroNIDAZOLE (Flagyl) 500 mg tablet    Vaginal irritation     N89.8    Relevant Medications    fluconazole (Diflucan) 150 mg tablet    Vaginal itching     N89.8    Relevant Medications    fluconazole (Diflucan) 150 mg tablet    Yeast infection     B37.9    Relevant Medications     fluconazole (Diflucan) 150 mg tablet        DISCHARGE SUMMARY:   For eye symptoms: Patient was seen and examined. Diagnosis, treatment, treatment options, and possible complications of today's illness discussed and explained to patient. Patient to take medication/s associated with this visit. Patient may also take OTC analgesic/antipyretic if needed for pain/fever. Advised eye protection, hand hygiene/washing, personal hygiene, and refrain from using contact lenses, eye shadows/liners, nor mascara. Advised to come back if with worsening or persistent symptoms. Patient verbalized understanding of plan of care. Patient to come back in 7 - 10 days if needed for worsening symptoms.     For symptoms of vaginal itching, irritation, and discharge: Patient seen and examined. Probable diagnosis, differential diagnosis, treatment, treatment options, and probable complications were discussed and explained to patient. Patient to take medication associated with this visit. She may take OTC antihistamine of choice as needed for itching. She was educated and advised on personal hygiene. She was advised to use cotton underwear. Patient to return to clinic if there is worsening or persistence of symptoms. Patient verbalized understanding. Patient to come back in 7 - 10 days if needed for worsening symptoms.     DOLLY Queen-CNP 05/21/24 2:40 PM

## 2024-05-21 NOTE — PROGRESS NOTES
"Subjective   Patient ID: Raissa Puentes is a 73 y.o. female who presents for Conjunctivitis and Vaginitis/Bacterial Vaginosis.      Symptoms: pink eye, redness, itching, draining,     Yeast, itching, foul odor, discharge.    Length of symptoms:pinkeye started yesterday.  Yeast 1 week ago.  OTC: none  Related information:   HPI     Review of Systems    Objective   /57 Comment: auto  Pulse 93   Temp 36.3 °C (97.3 °F)   Resp 16   Ht 1.651 m (5' 5\")   Wt 65.3 kg (144 lb)   SpO2 96%   BMI 23.96 kg/m²     Physical Exam    Assessment/Plan          "

## 2024-05-27 ASSESSMENT — PATIENT HEALTH QUESTIONNAIRE - PHQ9
1. LITTLE INTEREST OR PLEASURE IN DOING THINGS: NOT AT ALL
2. FEELING DOWN, DEPRESSED OR HOPELESS: NOT AT ALL
SUM OF ALL RESPONSES TO PHQ9 QUESTIONS 1 AND 2: 0

## 2024-05-27 ASSESSMENT — ENCOUNTER SYMPTOMS
LOSS OF SENSATION IN FEET: 0
DEPRESSION: 0
OCCASIONAL FEELINGS OF UNSTEADINESS: 1

## 2024-05-28 NOTE — PATIENT INSTRUCTIONS
DISCHARGE SUMMARY:   For eye symptoms: Patient was seen and examined. Diagnosis, treatment, treatment options, and possible complications of today's illness discussed and explained to patient. Patient to take medication/s associated with this visit. Patient may also take OTC analgesic/antipyretic if needed for pain/fever. Advised eye protection, hand hygiene/washing, personal hygiene, and refrain from using contact lenses, eye shadows/liners, nor mascara. Advised to come back if with worsening or persistent symptoms. Patient verbalized understanding of plan of care. Patient to come back in 7 - 10 days if needed for worsening symptoms.     For symptoms of vaginal itching, irritation, and discharge: Patient seen and examined. Probable diagnosis, differential diagnosis, treatment, treatment options, and probable complications were discussed and explained to patient. Patient to take medication associated with this visit. She may take OTC antihistamine of choice as needed for itching. She was educated and advised on personal hygiene. She was advised to use cotton underwear. Patient to return to clinic if there is worsening or persistence of symptoms. Patient verbalized understanding. Patient to come back in 7 - 10 days if needed for worsening symptoms.

## 2024-05-30 ENCOUNTER — OFFICE VISIT (OUTPATIENT)
Dept: PRIMARY CARE | Facility: CLINIC | Age: 74
End: 2024-05-30
Payer: MEDICARE

## 2024-05-30 VITALS
HEART RATE: 85 BPM | BODY MASS INDEX: 24.49 KG/M2 | TEMPERATURE: 97.9 F | DIASTOLIC BLOOD PRESSURE: 56 MMHG | SYSTOLIC BLOOD PRESSURE: 108 MMHG | RESPIRATION RATE: 18 BRPM | OXYGEN SATURATION: 97 % | HEIGHT: 65 IN | WEIGHT: 147 LBS

## 2024-05-30 DIAGNOSIS — F51.04 PSYCHOPHYSIOLOGICAL INSOMNIA: Primary | ICD-10-CM

## 2024-05-30 DIAGNOSIS — E11.9 TYPE 2 DIABETES MELLITUS WITHOUT COMPLICATION, WITHOUT LONG-TERM CURRENT USE OF INSULIN (MULTI): ICD-10-CM

## 2024-05-30 DIAGNOSIS — I10 PRIMARY HYPERTENSION: ICD-10-CM

## 2024-05-30 LAB
POC FINGERSTICK BLOOD GLUCOSE: 140 MG/DL (ref 70–100)
POC HEMOGLOBIN A1C: 6.7 % (ref 4.2–6.5)

## 2024-05-30 PROCEDURE — 83036 HEMOGLOBIN GLYCOSYLATED A1C: CPT | Performed by: FAMILY MEDICINE

## 2024-05-30 PROCEDURE — 82962 GLUCOSE BLOOD TEST: CPT | Performed by: FAMILY MEDICINE

## 2024-05-30 PROCEDURE — 99214 OFFICE O/P EST MOD 30 MIN: CPT | Performed by: FAMILY MEDICINE

## 2024-05-30 RX ORDER — TRAZODONE HYDROCHLORIDE 50 MG/1
50 TABLET ORAL NIGHTLY PRN
Qty: 30 TABLET | Refills: 0 | Status: SHIPPED | OUTPATIENT
Start: 2024-05-30 | End: 2025-05-30

## 2024-05-30 RX ORDER — LOSARTAN POTASSIUM 25 MG/1
25 TABLET ORAL DAILY
Qty: 30 TABLET | Refills: 2 | Status: SHIPPED | OUTPATIENT
Start: 2024-05-30

## 2024-05-30 ASSESSMENT — ENCOUNTER SYMPTOMS
DIARRHEA: 0
HEADACHES: 0
APPETITE CHANGE: 0
CONSTIPATION: 0
FLANK PAIN: 0
MYALGIAS: 0
FATIGUE: 0
HEMATURIA: 0
SEIZURES: 0
RHINORRHEA: 0
SORE THROAT: 0
POLYDIPSIA: 0
DYSPHORIC MOOD: 0
CHEST TIGHTNESS: 0
ACTIVITY CHANGE: 0
PHOTOPHOBIA: 0
NERVOUS/ANXIOUS: 0
DECREASED CONCENTRATION: 0
BLOOD IN STOOL: 0
RECTAL PAIN: 0
SINUS PRESSURE: 0
CONSTITUTIONAL NEGATIVE: 1
DYSURIA: 0
SHORTNESS OF BREATH: 0
TROUBLE SWALLOWING: 0
ARTHRALGIAS: 0
SLEEP DISTURBANCE: 0
EYE PAIN: 0
AGITATION: 0
COLOR CHANGE: 0
CONFUSION: 0
SINUS PAIN: 0
POLYPHAGIA: 0
ABDOMINAL PAIN: 0
ADENOPATHY: 0
SPEECH DIFFICULTY: 0
COUGH: 0
STRIDOR: 0
FEVER: 0
NECK STIFFNESS: 0
PALPITATIONS: 0
DIZZINESS: 0
ABDOMINAL DISTENTION: 0

## 2024-05-30 NOTE — PATIENT INSTRUCTIONS
Follow up in    Continue current medications and therapy for chronic medical conditions.    Patient was advised importance of proper diet/nutrition in addition adequate hydration. Patient was encouraged moderate exercise program to include 30 minutes daily for 5 days of the week or 150 minutes weekly. Patient will follow-up with us as scheduled.    IO A1C and Glucose today     Stop Lisinopril     Decrease Losartan to 25mg once daily     Start Trazodone 50mg once daily

## 2024-05-30 NOTE — PROGRESS NOTES
Subjective   Patient ID: Raissa Puentes is a 73 y.o. female who presents for Diabetes.    Patient daughter states would like discuss ozempic alternative.    Patient daughter would like discuss medications for night time.     Diabetes  She presents for her follow-up diabetic visit. She has type 2 diabetes mellitus. Pertinent negatives for hypoglycemia include no confusion, dizziness, headaches, nervousness/anxiousness, seizures or speech difficulty. Pertinent negatives for diabetes include no chest pain, no fatigue, no polydipsia, no polyphagia and no polyuria. There are no hypoglycemic complications. There are no diabetic complications. Risk factors for coronary artery disease include diabetes mellitus.        Review of Systems   Constitutional: Negative.  Negative for activity change, appetite change, fatigue and fever.   HENT:  Negative for congestion, dental problem, ear discharge, ear pain, mouth sores, rhinorrhea, sinus pressure, sinus pain, sore throat, tinnitus and trouble swallowing.    Eyes:  Negative for photophobia, pain and visual disturbance.   Respiratory:  Negative for cough, chest tightness, shortness of breath and stridor.    Cardiovascular:  Negative for chest pain and palpitations.   Gastrointestinal:  Negative for abdominal distention, abdominal pain, blood in stool, constipation, diarrhea and rectal pain.   Endocrine: Negative for cold intolerance, heat intolerance, polydipsia, polyphagia and polyuria.   Genitourinary:  Negative for dysuria, flank pain, hematuria and urgency.   Musculoskeletal:  Negative for arthralgias, gait problem, myalgias and neck stiffness.   Skin:  Negative for color change and rash.   Allergic/Immunologic: Negative for environmental allergies and food allergies.   Neurological:  Negative for dizziness, seizures, syncope, speech difficulty and headaches.   Hematological:  Negative for adenopathy.   Psychiatric/Behavioral:  Negative for agitation, confusion, decreased  "concentration, dysphoric mood and sleep disturbance. The patient is not nervous/anxious.        Objective   /56 (BP Location: Right arm, Patient Position: Sitting, BP Cuff Size: Adult)   Pulse 85   Temp 36.6 °C (97.9 °F)   Resp 18   Ht 1.651 m (5' 5\")   Wt 66.7 kg (147 lb)   SpO2 97%   BMI 24.46 kg/m²     Physical Exam  Vitals reviewed.   Constitutional:       General: She is not in acute distress.     Appearance: Normal appearance. She is normal weight. She is not ill-appearing or diaphoretic.   HENT:      Head: Normocephalic.      Right Ear: Tympanic membrane and external ear normal.      Left Ear: Tympanic membrane and external ear normal.      Nose: Nose normal. No congestion.      Mouth/Throat:      Pharynx: No posterior oropharyngeal erythema.   Eyes:      General:         Right eye: No discharge.         Left eye: No discharge.      Extraocular Movements: Extraocular movements intact.      Conjunctiva/sclera: Conjunctivae normal.      Pupils: Pupils are equal, round, and reactive to light.   Cardiovascular:      Rate and Rhythm: Normal rate and regular rhythm.      Pulses: Normal pulses.      Heart sounds: Normal heart sounds. No murmur heard.  Pulmonary:      Effort: Pulmonary effort is normal. No respiratory distress.      Breath sounds: Normal breath sounds. No wheezing or rales.   Chest:      Chest wall: No tenderness.   Abdominal:      General: Abdomen is flat. Bowel sounds are normal. There is no distension.      Palpations: There is no mass.      Tenderness: There is no abdominal tenderness. There is no guarding.   Musculoskeletal:         General: No tenderness. Normal range of motion.      Cervical back: Normal range of motion and neck supple. No tenderness.      Right lower leg: No edema.      Left lower leg: No edema.   Skin:     General: Skin is dry.      Coloration: Skin is not jaundiced.      Findings: No bruising, erythema or rash.   Neurological:      General: No focal deficit " present.      Mental Status: She is alert and oriented to person, place, and time. Mental status is at baseline.      Cranial Nerves: No cranial nerve deficit.      Sensory: No sensory deficit.      Coordination: Coordination normal.      Gait: Gait normal.   Psychiatric:         Mood and Affect: Mood normal.         Thought Content: Thought content normal.         Judgment: Judgment normal.         Assessment/Plan   Problem List Items Addressed This Visit             ICD-10-CM    Type 2 diabetes mellitus without complication, without long-term current use of insulin ([facility]) E11.9    Relevant Orders    POCT glycosylated hemoglobin (Hb A1C) manually resulted (Completed)    POCT Fingerstick Glucose manually resulted (Completed)         Scribe Attestation  By signing my name below, ITanika RMA , Devin   attest that this documentation has been prepared under the direction and in the presence of Elier Alegria DO.   Provider Attestation - Scribe documentation    All medical record entries made by the Scribe were at my direction and personally dictated by me. I have reviewed the chart and agree that the record accurately reflects my personal performance of the history, physical exam, discussion and plan.

## 2024-05-31 ENCOUNTER — PATIENT OUTREACH (OUTPATIENT)
Dept: PRIMARY CARE | Facility: CLINIC | Age: 74
End: 2024-05-31
Payer: MEDICARE

## 2024-05-31 DIAGNOSIS — F51.04 PSYCHOPHYSIOLOGICAL INSOMNIA: ICD-10-CM

## 2024-05-31 DIAGNOSIS — E11.9 TYPE 2 DIABETES MELLITUS WITHOUT COMPLICATION, WITHOUT LONG-TERM CURRENT USE OF INSULIN (MULTI): ICD-10-CM

## 2024-05-31 PROCEDURE — 99490 CHRNC CARE MGMT STAFF 1ST 20: CPT | Performed by: FAMILY MEDICINE

## 2024-06-03 RX ORDER — FERROUS SULFATE, DRIED 160(50) MG
1 TABLET, EXTENDED RELEASE ORAL DAILY
COMMUNITY

## 2024-06-03 RX ORDER — GLIPIZIDE 10 MG/1
10 TABLET, FILM COATED, EXTENDED RELEASE ORAL DAILY
COMMUNITY

## 2024-06-03 RX ORDER — CELECOXIB 100 MG/1
100 CAPSULE ORAL DAILY
COMMUNITY

## 2024-06-03 NOTE — PROGRESS NOTES
Started trazodone 1/2 tab  Done this for 2 nights  Tolerating well    Reviewed medications in detail

## 2024-06-10 ENCOUNTER — PATIENT OUTREACH (OUTPATIENT)
Dept: PRIMARY CARE | Facility: CLINIC | Age: 74
End: 2024-06-10
Payer: MEDICARE

## 2024-06-10 DIAGNOSIS — E11.9 TYPE 2 DIABETES MELLITUS WITHOUT COMPLICATION, WITHOUT LONG-TERM CURRENT USE OF INSULIN (MULTI): ICD-10-CM

## 2024-06-10 DIAGNOSIS — I10 PRIMARY HYPERTENSION: ICD-10-CM

## 2024-06-10 NOTE — PROGRESS NOTES
Spoke with  Kenan  Still has not heard from pharmacy  Has about 2 more doses of Ozempic  Then it will become too expensive    Ordered [employer] system  Amando incontinent of urine at night  Soaking through clothes and bedding  Will try this system

## 2024-06-25 DIAGNOSIS — E11.9 TYPE 2 DIABETES MELLITUS WITHOUT COMPLICATION, WITHOUT LONG-TERM CURRENT USE OF INSULIN (MULTI): ICD-10-CM

## 2024-06-25 DIAGNOSIS — F51.04 PSYCHOPHYSIOLOGICAL INSOMNIA: ICD-10-CM

## 2024-06-25 DIAGNOSIS — G30.0 EARLY ONSET ALZHEIMER'S DEMENTIA WITHOUT BEHAVIORAL DISTURBANCE (MULTI): ICD-10-CM

## 2024-06-25 DIAGNOSIS — F02.80 EARLY ONSET ALZHEIMER'S DEMENTIA WITHOUT BEHAVIORAL DISTURBANCE (MULTI): ICD-10-CM

## 2024-06-25 DIAGNOSIS — F33.41 RECURRENT MAJOR DEPRESSIVE DISORDER, IN PARTIAL REMISSION (CMS-HCC): ICD-10-CM

## 2024-06-25 RX ORDER — TRAZODONE HYDROCHLORIDE 50 MG/1
50 TABLET ORAL NIGHTLY PRN
Qty: 30 TABLET | Refills: 0 | Status: SHIPPED | OUTPATIENT
Start: 2024-06-25

## 2024-06-25 NOTE — TELEPHONE ENCOUNTER
Dr. Alegria patient    Samanta (patients caregiver) is calling for a referral to  Behavioral Services due to her Alzheimer's.    Please call [phone] when placed.

## 2024-06-25 NOTE — TELEPHONE ENCOUNTER
Dr. Alegria Pt    Refill for  traZODone (Desyrel) 50 mg tablet      [id number] - [place], [place] - [address] [facility]

## 2024-06-26 RX ORDER — SEMAGLUTIDE 0.68 MG/ML
0.25 INJECTION, SOLUTION SUBCUTANEOUS
Qty: 3 ML | Refills: 0 | Status: SHIPPED | OUTPATIENT
Start: 2024-06-26

## 2024-06-27 ENCOUNTER — PATIENT OUTREACH (OUTPATIENT)
Dept: PRIMARY CARE | Facility: CLINIC | Age: 74
End: 2024-06-27
Payer: MEDICARE

## 2024-06-27 DIAGNOSIS — G30.0 EARLY ONSET ALZHEIMER'S DEMENTIA WITHOUT BEHAVIORAL DISTURBANCE (MULTI): ICD-10-CM

## 2024-06-27 DIAGNOSIS — F02.80 EARLY ONSET ALZHEIMER'S DEMENTIA WITHOUT BEHAVIORAL DISTURBANCE (MULTI): ICD-10-CM

## 2024-06-27 DIAGNOSIS — F51.04 PSYCHOPHYSIOLOGICAL INSOMNIA: ICD-10-CM

## 2024-06-27 NOTE — PROGRESS NOTES
Unfocused during the day  Not sleeping much at night either  Very restless    Today having difficulty doing simple tasks   like standing up out of seat  Does not seem agitated  Seems she does not understand   what she is being asked to do    Asking if increasing trazodone would help  Referral placed for [facility]  Gave her phone number for Dr. Benjamin in [place]

## 2024-06-28 DIAGNOSIS — E11.9 TYPE 2 DIABETES MELLITUS WITHOUT COMPLICATION, WITHOUT LONG-TERM CURRENT USE OF INSULIN (MULTI): Primary | ICD-10-CM

## 2024-07-01 RX ORDER — TRAZODONE HYDROCHLORIDE 100 MG/1
100 TABLET ORAL NIGHTLY PRN
Qty: 30 TABLET | Refills: 1 | Status: SHIPPED | OUTPATIENT
Start: 2024-07-01

## 2024-07-02 ENCOUNTER — APPOINTMENT (OUTPATIENT)
Dept: PRIMARY CARE | Facility: CLINIC | Age: 74
End: 2024-07-02
Payer: MEDICARE

## 2024-07-02 VITALS
DIASTOLIC BLOOD PRESSURE: 60 MMHG | OXYGEN SATURATION: 99 % | RESPIRATION RATE: 18 BRPM | SYSTOLIC BLOOD PRESSURE: 118 MMHG | HEART RATE: 98 BPM

## 2024-07-02 DIAGNOSIS — F51.04 PSYCHOPHYSIOLOGICAL INSOMNIA: Primary | ICD-10-CM

## 2024-07-02 DIAGNOSIS — R53.83 FATIGUE, UNSPECIFIED TYPE: ICD-10-CM

## 2024-07-02 DIAGNOSIS — F02.B11 MODERATE EARLY ONSET ALZHEIMER'S DEMENTIA WITH AGITATION (MULTI): ICD-10-CM

## 2024-07-02 DIAGNOSIS — R45.4 IRRITABILITY: ICD-10-CM

## 2024-07-02 DIAGNOSIS — E78.2 MIXED HYPERLIPIDEMIA: ICD-10-CM

## 2024-07-02 DIAGNOSIS — E61.1 IRON DEFICIENCY: ICD-10-CM

## 2024-07-02 DIAGNOSIS — I10 PRIMARY HYPERTENSION: ICD-10-CM

## 2024-07-02 DIAGNOSIS — R35.0 URINARY FREQUENCY: ICD-10-CM

## 2024-07-02 DIAGNOSIS — E55.9 VITAMIN D DEFICIENCY: ICD-10-CM

## 2024-07-02 DIAGNOSIS — G30.0 MODERATE EARLY ONSET ALZHEIMER'S DEMENTIA WITH AGITATION (MULTI): ICD-10-CM

## 2024-07-02 LAB
POC APPEARANCE, URINE: CLEAR
POC BILIRUBIN, URINE: NEGATIVE
POC BLOOD, URINE: NEGATIVE
POC COLOR, URINE: YELLOW
POC GLUCOSE, URINE: NEGATIVE MG/DL
POC KETONES, URINE: NEGATIVE MG/DL
POC LEUKOCYTES, URINE: ABNORMAL
POC NITRITE,URINE: NEGATIVE
POC PH, URINE: 5.5 PH
POC PROTEIN, URINE: NEGATIVE MG/DL
POC SPECIFIC GRAVITY, URINE: >=1.03
POC UROBILINOGEN, URINE: 0.2 EU/DL

## 2024-07-02 PROCEDURE — 81003 URINALYSIS AUTO W/O SCOPE: CPT | Performed by: FAMILY MEDICINE

## 2024-07-02 PROCEDURE — 99214 OFFICE O/P EST MOD 30 MIN: CPT | Performed by: FAMILY MEDICINE

## 2024-07-02 PROCEDURE — G2211 COMPLEX E/M VISIT ADD ON: HCPCS | Performed by: FAMILY MEDICINE

## 2024-07-02 RX ORDER — QUETIAPINE FUMARATE 25 MG/1
25 TABLET, FILM COATED ORAL 2 TIMES DAILY
Qty: 60 TABLET | Refills: 0 | Status: SHIPPED | OUTPATIENT
Start: 2024-07-02 | End: 2024-12-29

## 2024-07-02 ASSESSMENT — ENCOUNTER SYMPTOMS
FATIGUE: 0
EYE PAIN: 0
DEPRESSION: 0
DIARRHEA: 0
NERVOUS/ANXIOUS: 0
CONFUSION: 1
FLANK PAIN: 0
CONSTIPATION: 0
CHEST TIGHTNESS: 0
OCCASIONAL FEELINGS OF UNSTEADINESS: 0
RECTAL PAIN: 0
LOSS OF SENSATION IN FEET: 0
DIZZINESS: 0
STRIDOR: 0
POLYDIPSIA: 0
PALPITATIONS: 0
RHINORRHEA: 0
BLOOD IN STOOL: 0
SINUS PRESSURE: 0
ADENOPATHY: 0
COLOR CHANGE: 0
SPEECH DIFFICULTY: 0
COUGH: 0
POLYPHAGIA: 0
HEMATURIA: 0
SHORTNESS OF BREATH: 0
ARTHRALGIAS: 0
MYALGIAS: 0
ABDOMINAL DISTENTION: 0
FEVER: 0
ABDOMINAL PAIN: 0
SLEEP DISTURBANCE: 1
SEIZURES: 0
SORE THROAT: 0
NECK STIFFNESS: 0
SINUS PAIN: 0
DYSURIA: 0
HEADACHES: 0
TROUBLE SWALLOWING: 0
PHOTOPHOBIA: 0
APPETITE CHANGE: 0
CONSTITUTIONAL NEGATIVE: 1
ACTIVITY CHANGE: 0

## 2024-07-02 NOTE — PATIENT INSTRUCTIONS
Follow up in 2 weeks    Continue current medications and therapy for chronic medical conditions.    Patient was advised importance of proper diet/nutrition in addition adequate hydration. Patient was encouraged moderate exercise program to include 30 minutes daily for 5 days of the week or 150 minutes weekly. Patient will follow-up with us as scheduled.    UA done today    Start seroquel 25 mg twice daily     Consider [facility] care aide with patient today during office visit

## 2024-07-02 NOTE — PROGRESS NOTES
Subjective   Patient ID: Raissa Puentes is a 74 y.o. female who presents for Mental Health Problem.    Patient caregiver states her shania status has changed. Patient caregiver states she is not sleeping. Currently taking trazodone with poor symptom control.  Patient caregiver states her mental status changed, unable to follow instructions. Has appointmen with psychiatrist in August.    Patient's caregiver believes she have a UTI.    Patient denies any other symptoms or concerns today.       Review of Systems   Constitutional: Negative.  Negative for activity change, appetite change, fatigue and fever.   HENT:  Negative for congestion, dental problem, ear discharge, ear pain, mouth sores, rhinorrhea, sinus pressure, sinus pain, sore throat, tinnitus and trouble swallowing.    Eyes:  Negative for photophobia, pain and visual disturbance.   Respiratory:  Negative for cough, chest tightness, shortness of breath and stridor.    Cardiovascular:  Negative for chest pain and palpitations.   Gastrointestinal:  Negative for abdominal distention, abdominal pain, blood in stool, constipation, diarrhea and rectal pain.   Endocrine: Negative for cold intolerance, heat intolerance, polydipsia, polyphagia and polyuria.   Genitourinary:  Negative for dysuria, flank pain, hematuria and urgency.   Musculoskeletal:  Negative for arthralgias, gait problem, myalgias and neck stiffness.   Skin:  Negative for color change and rash.   Allergic/Immunologic: Negative for environmental allergies and food allergies.   Neurological:  Negative for dizziness, seizures, syncope, speech difficulty and headaches.   Hematological:  Negative for adenopathy.   Psychiatric/Behavioral:  Positive for agitation, behavioral problems, confusion, decreased concentration, dysphoric mood and sleep disturbance. The patient is not nervous/anxious.        Objective   /60 (BP Location: Right arm, Patient Position: Sitting, BP Cuff Size: Adult)   Pulse 98    Resp 18   SpO2 99%     Physical Exam  Vitals reviewed.   Constitutional:       General: She is not in acute distress.     Appearance: Normal appearance. She is normal weight. She is not ill-appearing or diaphoretic.   HENT:      Head: Normocephalic.      Right Ear: Tympanic membrane and external ear normal.      Left Ear: Tympanic membrane and external ear normal.      Nose: Nose normal. No congestion.      Mouth/Throat:      Pharynx: No posterior oropharyngeal erythema.   Eyes:      General:         Right eye: No discharge.         Left eye: No discharge.      Extraocular Movements: Extraocular movements intact.      Conjunctiva/sclera: Conjunctivae normal.      Pupils: Pupils are equal, round, and reactive to light.   Cardiovascular:      Rate and Rhythm: Normal rate and regular rhythm.      Pulses: Normal pulses.      Heart sounds: Normal heart sounds. No murmur heard.  Pulmonary:      Effort: Pulmonary effort is normal. No respiratory distress.      Breath sounds: Normal breath sounds. No wheezing or rales.   Chest:      Chest wall: No tenderness.   Abdominal:      General: Abdomen is flat. Bowel sounds are normal. There is no distension.      Palpations: There is no mass.      Tenderness: There is no abdominal tenderness. There is no guarding.   Musculoskeletal:         General: No tenderness. Normal range of motion.      Cervical back: Normal range of motion and neck supple. No tenderness.      Right lower leg: No edema.      Left lower leg: No edema.   Skin:     General: Skin is dry.      Coloration: Skin is not jaundiced.      Findings: No bruising, erythema or rash.   Neurological:      Mental Status: She is alert.      Cranial Nerves: No cranial nerve deficit.      Sensory: No sensory deficit.      Coordination: Coordination abnormal.      Gait: Gait abnormal.      Comments: Progressive memory loss.  Patient also showing more rigidity on ambulation.  Displays difficulty going from seated to standing  position, sometimes requiring assistance.  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 (Multi)     G30.0, F02.B11    Urinary frequency     R35.0    Relevant Orders    POCT UA Automated manually resulted (Completed)    Irritability     R45.4    Relevant Medications    QUEtiapine (SEROquel) 25 mg tablet    Vitamin D deficiency     E55.9    Relevant Orders    Vitamin D 25-Hydroxy,Total (for eval of Vitamin D levels) (Completed)    Fatigue, unspecified type     R53.83    Relevant Orders    Thyroid Stimulating Hormone    Folate (Completed)    Vitamin B12 (Completed)    Iron deficiency     E61.1    Relevant Orders    Iron and TIBC (Completed)          Scribe Attestation  By signing my name below, I, Elier Alegria DO , [place]   attest that this documentation has been prepared under the direction and in the presence of Elier Alegria DO.    Provider Attestation - Scribe documentation    All medical record entries made by the Scribe were at my direction and personally dictated by me. I have reviewed the chart and agree that the record accurately reflects my personal performance of the history, physical exam, discussion and plan.

## 2024-07-03 ENCOUNTER — LAB (OUTPATIENT)
Dept: LAB | Facility: LAB | Age: 74
End: 2024-07-03
Payer: MEDICARE

## 2024-07-03 DIAGNOSIS — E61.1 IRON DEFICIENCY: ICD-10-CM

## 2024-07-03 DIAGNOSIS — R53.83 FATIGUE, UNSPECIFIED TYPE: ICD-10-CM

## 2024-07-03 DIAGNOSIS — E11.9 TYPE 2 DIABETES MELLITUS WITHOUT COMPLICATION, WITHOUT LONG-TERM CURRENT USE OF INSULIN (MULTI): ICD-10-CM

## 2024-07-03 DIAGNOSIS — E78.2 MIXED HYPERLIPIDEMIA: ICD-10-CM

## 2024-07-03 DIAGNOSIS — E55.9 VITAMIN D DEFICIENCY: ICD-10-CM

## 2024-07-03 DIAGNOSIS — I10 PRIMARY HYPERTENSION: ICD-10-CM

## 2024-07-03 DIAGNOSIS — F33.41 RECURRENT MAJOR DEPRESSIVE DISORDER, IN PARTIAL REMISSION (CMS-HCC): ICD-10-CM

## 2024-07-03 LAB
BASOPHILS # BLD AUTO: 0.08 X10*3/UL (ref 0–0.1)
BASOPHILS NFR BLD AUTO: 0.9 %
EOSINOPHIL # BLD AUTO: 0.25 X10*3/UL (ref 0–0.4)
EOSINOPHIL NFR BLD AUTO: 2.9 %
ERYTHROCYTE [DISTWIDTH] IN BLOOD BY AUTOMATED COUNT: 13 % (ref 11.5–14.5)
FOLATE SERPL-MCNC: 14.9 NG/ML
HCT VFR BLD AUTO: 41.7 % (ref 36–46)
HGB BLD-MCNC: 13.5 G/DL (ref 12–16)
IMM GRANULOCYTES # BLD AUTO: 0.01 X10*3/UL (ref 0–0.5)
IMM GRANULOCYTES NFR BLD AUTO: 0.1 % (ref 0–0.9)
LYMPHOCYTES # BLD AUTO: 1.91 X10*3/UL (ref 0.8–3)
LYMPHOCYTES NFR BLD AUTO: 22.4 %
MCH RBC QN AUTO: 31.3 PG (ref 26–34)
MCHC RBC AUTO-ENTMCNC: 32.4 G/DL (ref 32–36)
MCV RBC AUTO: 97 FL (ref 80–100)
MONOCYTES # BLD AUTO: 0.61 X10*3/UL (ref 0.05–0.8)
MONOCYTES NFR BLD AUTO: 7.1 %
NEUTROPHILS # BLD AUTO: 5.68 X10*3/UL (ref 1.6–5.5)
NEUTROPHILS NFR BLD AUTO: 66.6 %
NRBC BLD-RTO: 0 /100 WBCS (ref 0–0)
PLATELET # BLD AUTO: 236 X10*3/UL (ref 150–450)
RBC # BLD AUTO: 4.31 X10*6/UL (ref 4–5.2)
TSH SERPL-ACNC: 1 MIU/L (ref 0.44–3.98)
WBC # BLD AUTO: 8.5 X10*3/UL (ref 4.4–11.3)

## 2024-07-03 PROCEDURE — 85025 COMPLETE CBC W/AUTO DIFF WBC: CPT

## 2024-07-03 PROCEDURE — 82746 ASSAY OF FOLIC ACID SERUM: CPT

## 2024-07-03 PROCEDURE — 83735 ASSAY OF MAGNESIUM: CPT

## 2024-07-03 PROCEDURE — 84443 ASSAY THYROID STIM HORMONE: CPT

## 2024-07-03 PROCEDURE — 82607 VITAMIN B-12: CPT

## 2024-07-03 PROCEDURE — 36415 COLL VENOUS BLD VENIPUNCTURE: CPT

## 2024-07-03 PROCEDURE — 83550 IRON BINDING TEST: CPT

## 2024-07-03 PROCEDURE — 80053 COMPREHEN METABOLIC PANEL: CPT

## 2024-07-03 PROCEDURE — 83540 ASSAY OF IRON: CPT

## 2024-07-03 PROCEDURE — 82306 VITAMIN D 25 HYDROXY: CPT

## 2024-07-03 PROCEDURE — 82550 ASSAY OF CK (CPK): CPT

## 2024-07-03 PROCEDURE — 80061 LIPID PANEL: CPT

## 2024-07-04 LAB
25(OH)D3 SERPL-MCNC: 40 NG/ML (ref 30–100)
ALBUMIN SERPL BCP-MCNC: 4.2 G/DL (ref 3.4–5)
ALP SERPL-CCNC: 104 U/L (ref 33–136)
ALT SERPL W P-5'-P-CCNC: 19 U/L (ref 7–45)
ANION GAP SERPL CALC-SCNC: 11 MMOL/L (ref 10–20)
AST SERPL W P-5'-P-CCNC: 25 U/L (ref 9–39)
BILIRUB SERPL-MCNC: 0.5 MG/DL (ref 0–1.2)
BUN SERPL-MCNC: 19 MG/DL (ref 6–23)
CALCIUM SERPL-MCNC: 10.2 MG/DL (ref 8.6–10.3)
CHLORIDE SERPL-SCNC: 103 MMOL/L (ref 98–107)
CHOLEST SERPL-MCNC: 141 MG/DL (ref 0–199)
CHOLESTEROL/HDL RATIO: 2.5
CK SERPL-CCNC: 68 U/L (ref 0–215)
CO2 SERPL-SCNC: 31 MMOL/L (ref 21–32)
CREAT SERPL-MCNC: 0.58 MG/DL (ref 0.5–1.05)
EGFRCR SERPLBLD CKD-EPI 2021: >90 ML/MIN/1.73M*2
GLUCOSE SERPL-MCNC: 138 MG/DL (ref 74–99)
HDLC SERPL-MCNC: 55.8 MG/DL
IRON SATN MFR SERPL: 20 % (ref 25–45)
IRON SERPL-MCNC: 78 UG/DL (ref 35–150)
LDLC SERPL CALC-MCNC: 63 MG/DL
MAGNESIUM SERPL-MCNC: 1.79 MG/DL (ref 1.6–2.4)
NON HDL CHOLESTEROL: 85 MG/DL (ref 0–149)
POTASSIUM SERPL-SCNC: 4.3 MMOL/L (ref 3.5–5.3)
PROT SERPL-MCNC: 6.8 G/DL (ref 6.4–8.2)
SODIUM SERPL-SCNC: 141 MMOL/L (ref 136–145)
TIBC SERPL-MCNC: 389 UG/DL (ref 240–445)
TRIGL SERPL-MCNC: 113 MG/DL (ref 0–149)
UIBC SERPL-MCNC: 311 UG/DL (ref 110–370)
VIT B12 SERPL-MCNC: 591 PG/ML (ref 211–911)
VLDL: 23 MG/DL (ref 0–40)

## 2024-07-07 ASSESSMENT — ENCOUNTER SYMPTOMS
DECREASED CONCENTRATION: 1
DYSPHORIC MOOD: 1
AGITATION: 1

## 2024-07-11 ENCOUNTER — PATIENT OUTREACH (OUTPATIENT)
Dept: PRIMARY CARE | Facility: CLINIC | Age: 74
End: 2024-07-11
Payer: MEDICARE

## 2024-07-11 ENCOUNTER — TELEPHONE (OUTPATIENT)
Dept: PRIMARY CARE | Facility: CLINIC | Age: 74
End: 2024-07-11
Payer: MEDICARE

## 2024-07-11 DIAGNOSIS — F02.B11 MODERATE EARLY ONSET ALZHEIMER'S DEMENTIA WITH AGITATION (MULTI): ICD-10-CM

## 2024-07-11 DIAGNOSIS — F51.04 PSYCHOPHYSIOLOGICAL INSOMNIA: ICD-10-CM

## 2024-07-11 DIAGNOSIS — G30.0 MODERATE EARLY ONSET ALZHEIMER'S DEMENTIA WITH AGITATION (MULTI): ICD-10-CM

## 2024-07-11 NOTE — PROGRESS NOTES
Received call from [place]  They have noticed that since increasing seroquel   Blood sugars have steadily increased  They typically check blood sugar fasting in the am  In the 200's  This morning 240  Denies increased thirst, increased urination, fatigue, nausea/vomiting  Advised to check blood sugar more often if symptoms appear he verbalized understanding    Denies any change in diet or medications (except the seroquel)    Reviewed Up To Date:  Quetiapine is associated with hyperglycemia, to varying degrees, which is a component of the metabolic syndrome observed with this pharmacologic class. Glycemic abnormalities range from mild insulin resistance or hyperglycemia to new-onset diabetes mellitus and diabetic ketoacidosis, including fatal cases (Ref).    Mechanism: The mechanism is not entirely understood and is likely multifactorial (Ref).    Onset: Varied; new-onset diabetes has been observed within first 3 months to a median onset of 3.9 years (Ref).

## 2024-07-12 ENCOUNTER — APPOINTMENT (OUTPATIENT)
Dept: PHARMACY | Facility: HOSPITAL | Age: 74
End: 2024-07-12
Payer: MEDICARE

## 2024-07-12 ENCOUNTER — SPECIALTY PHARMACY (OUTPATIENT)
Dept: PHARMACY | Facility: CLINIC | Age: 74
End: 2024-07-12

## 2024-07-12 DIAGNOSIS — E11.9 TYPE 2 DIABETES MELLITUS WITHOUT COMPLICATION, WITHOUT LONG-TERM CURRENT USE OF INSULIN (MULTI): ICD-10-CM

## 2024-07-12 RX ORDER — SEMAGLUTIDE 0.68 MG/ML
0.25 INJECTION, SOLUTION SUBCUTANEOUS
Qty: 3 ML | Refills: 0 | Status: SHIPPED | OUTPATIENT
Start: 2024-07-12

## 2024-07-12 NOTE — PROGRESS NOTES
Clinical Pharmacy Appointment    Patient ID: Raissa Puentes is a 74 y.o. female who presents for Diabetes.    Pt is here for First appointment.     Referring Provider: Elier Alegria DO  PCP: Elier Alegria DO   Last visit with PCP: 7/2/2024   Next visit with PCP: 7/23/2024      Subjective     HPI  DIABETES MELLITUS TYPE 2:    Diagnosed with diabetes:  10. Known diabetic complications: HTN, HLD.  Does patient follow with Endocrinology: No  Last optometry exam: Up to date  Most recent visit in Podiatry: Up to date -- patient denies sores or cuts on feet today      Current diabetic medications include:  Glipizide 10mg daily  Actos 45mg daily  Ozempic 0.25mg weekly (Thursday)    Clarifications to above regimen: patient has been on Ozempic 0.25mg for about 2 years. She has not increased the dose due to it being so expensive  Past Treatments: Januvia, Jardiance, Metformin  Adverse Effects: None reported    Glucose Readings:  Glucometer/CGM Type: glucometer  Patient tests BG 1 time per day (morning)    Current home BG readings: 220-240mg/dL     Any episodes of hypoglycemia? No,   .  Did patient treat episode of hypoglycemia appropriately? N/A  Does the patient have a prescription for ready-to-use Glucagon? Not on insulin    Secondary Prevention:  Statin? Yes  ACE-I/ARB? Yes  Aspirin? No    Pertinent PMH Review:  PMH of Pancreatitis: No  PMH of Retinopathy: No  PMH of Urinary Tract Infections: No  PMH of MTC: No    Drug Interactions  No significant drug interactions were noted.    Medication System Management  Patients preferred pharmacy: [facility]  Adherence/Organization: No issues reported;  and daughter help manage  Affordability/Accessibility: Ozempic expensive, will look into patient assistance     Patient Assistance Screening (VAF)  Patient verbally reports monthly or yearly income which is less than 400% federal poverty level  Application for program has been submitted for the following  medications:   Ozempic 0.5mg under the skin once weekly  Patient aware this process may take up to 2 weeks once income documents have been sent to the team.  If approved, medication must be filled through [facility] pharmacy and may be picked up or mailed to patient.   If approved, medication will be billed through insurance, and patient assistance team will pay the copay. This will result in a $0 copay for the patient.  Counseled patient on mechanism of action, side effects, contraindications, and what to do if the patient misses a dose. All patients questions were answered.       Objective   Allergies   Allergen Reactions    Azithromycin Diarrhea    Cephalexin Other     Yeast infection    Ciprofloxacin Diarrhea    Hydrocodone-Guaifenesin Other     jittery    Oxycodone-Acetaminophen Other     jittery    Propoxyphene-Acetaminophen Other     jittery     Social History     Social History Narrative    Not on file      Medication Review  Current Outpatient Medications   Medication Instructions    Blood glucose monitoring meter kit kit TEST 2-3 TIMES DAILY    blood sugar diagnostic (FreeStyle Test) strip TEST 2-3 TIMES DAILY    buPROPion SR (WELLBUTRIN SR) 150 mg, oral, 2 times daily    calcium carbonate-vitamin D3 500 mg-5 mcg (200 unit) tablet 1 tablet, oral, Daily    celecoxib (CELEBREX) 100 mg, oral, Daily    citalopram (CELEXA) 20 mg, oral, Daily    donepezil (ARICEPT) 10 mg, oral, 2 times daily    fluticasone (Flonase) 50 mcg/actuation nasal spray 1 spray, Each Nostril, Daily    glipiZIDE XL (GLUCOTROL XL) 10 mg, oral, Daily, Do not crush, chew, or split.    lancets misc TEST 2-3 TIMES DAILY    losartan (COZAAR) 25 mg, oral, Daily    memantine (NAMENDA) 10 mg, oral, 2 times daily    Ozempic 0.25 mg, subcutaneous, Every 7 days    pioglitazone (ACTOS) 45 mg, oral, Daily    QUEtiapine (SEROQUEL) 25 mg, oral, 2 times daily    simvastatin (ZOCOR) 40 mg, oral, Daily    traZODone (DESYREL) 100 mg, oral, Nightly PRN     "  Vitals  BP Readings from Last 2 Encounters:   07/02/24 118/60   05/30/24 108/56     BMI Readings from Last 1 Encounters:   05/30/24 24.46 kg/m²      Labs  A1C  Lab Results   Component Value Date    HGBA1C 6.7 (A) 05/30/2024    HGBA1C 7.5 (A) 02/29/2024    HGBA1C 7.9 (A) 11/30/2023     BMP  Lab Results   Component Value Date    CALCIUM 10.2 07/03/2024     07/03/2024    K 4.3 07/03/2024    CO2 31 07/03/2024     07/03/2024    BUN 19 07/03/2024    CREATININE 0.58 07/03/2024    EGFR >90 07/03/2024     LFTs  Lab Results   Component Value Date    ALT 19 07/03/2024    AST 25 07/03/2024    ALKPHOS 104 07/03/2024    BILITOT 0.5 07/03/2024     FLP  Lab Results   Component Value Date    TRIG 113 07/03/2024    CHOL 141 07/03/2024    LDLF 70 11/28/2022    LDLCALC 63 07/03/2024    HDL 55.8 07/03/2024     Urine Microalbumin  No results found for: \"MICROALBCREA\"  Weight Management  Wt Readings from Last 3 Encounters:   05/30/24 66.7 kg (147 lb)   05/21/24 65.3 kg (144 lb)   02/29/24 65.3 kg (144 lb)      There is no height or weight on file to calculate BMI.     Assessment/Plan   Problem List Items Addressed This Visit       Type 2 diabetes mellitus without complication, without long-term current use of insulin (Multi)     Patients diabetes is well controlled as evidenced by the most recent A1c of 6.7% (Goal <7%) on 5/30/2024.   RX CHANGES  INCREASE Ozempic to 0.5mg under the skin once weekly. Will send medication to [facility] for mail order and to assess for  Patient Assistance.  Continue checking fasting BG once daily in the morning.  Continue working towards healthy diet and lifestyle.    Patients FBG may be slightly elevated due to starting Seroquel. Patients  states this has helped her tremendously, so will look into other options for BG control.         Relevant Medications    semaglutide (Ozempic) 0.25 mg or 0.5 mg (2 mg/3 mL) pen injector       Clinical Pharmacist follow-up: once there has " been a determination on  Patient Assistance.    Continue all meds under the continuation of care with the referring provider and clinical pharmacy team.    Thank you,  Savana Joseph, PharmD  Clinical Pharmacist  [phone]    Verbal consent to manage patient's drug therapy was obtained from an individual authorized to act on behalf of a patient. They were informed they may decline to participate or withdraw from participation in pharmacy services at any time.

## 2024-07-13 NOTE — ASSESSMENT & PLAN NOTE
Patients diabetes is well controlled as evidenced by the most recent A1c of 6.7% (Goal <7%) on 5/30/2024.   RX CHANGES  INCREASE Ozempic to 0.5mg under the skin once weekly. Will send medication to [facility] for mail order and to assess for  Patient Assistance.  Continue checking fasting BG once daily in the morning.  Continue working towards healthy diet and lifestyle.    Patients FBG may be slightly elevated due to starting Seroquel. Patients  states this has helped her tremendously, so will look into other options for BG control.

## 2024-07-22 DIAGNOSIS — F51.04 PSYCHOPHYSIOLOGICAL INSOMNIA: ICD-10-CM

## 2024-07-22 DIAGNOSIS — R45.4 IRRITABILITY: ICD-10-CM

## 2024-07-22 RX ORDER — QUETIAPINE FUMARATE 25 MG/1
25 TABLET, FILM COATED ORAL 2 TIMES DAILY
Qty: 60 TABLET | Refills: 0 | Status: SHIPPED | OUTPATIENT
Start: 2024-07-22 | End: 2025-01-18

## 2024-07-22 NOTE — TELEPHONE ENCOUNTER
Dr. Alegria patient  Refill for:  QUEtiapine (SEROquel) 25 mg tablet  [facility] in [place] on [address]

## 2024-07-23 ENCOUNTER — APPOINTMENT (OUTPATIENT)
Dept: PRIMARY CARE | Facility: CLINIC | Age: 74
End: 2024-07-23
Payer: MEDICARE

## 2024-07-23 DIAGNOSIS — F02.80 EARLY ONSET ALZHEIMER'S DEMENTIA WITHOUT BEHAVIORAL DISTURBANCE (MULTI): ICD-10-CM

## 2024-07-23 DIAGNOSIS — I10 PRIMARY HYPERTENSION: Primary | ICD-10-CM

## 2024-07-23 DIAGNOSIS — E11.9 TYPE 2 DIABETES MELLITUS WITHOUT COMPLICATION, WITHOUT LONG-TERM CURRENT USE OF INSULIN (MULTI): ICD-10-CM

## 2024-07-23 DIAGNOSIS — G30.0 EARLY ONSET ALZHEIMER'S DEMENTIA WITHOUT BEHAVIORAL DISTURBANCE (MULTI): ICD-10-CM

## 2024-07-23 DIAGNOSIS — R41.3 MEMORY CHANGES: ICD-10-CM

## 2024-07-23 PROCEDURE — 99442 PR PHYS/QHP TELEPHONE EVALUATION 11-20 MIN: CPT | Performed by: FAMILY MEDICINE

## 2024-07-23 ASSESSMENT — ENCOUNTER SYMPTOMS
SPEECH DIFFICULTY: 0
CONSTIPATION: 0
AGITATION: 1
STRIDOR: 0
BLOOD IN STOOL: 0
PALPITATIONS: 0
ACTIVITY CHANGE: 0
POLYDIPSIA: 0
EYE PAIN: 0
HEADACHES: 0
POLYPHAGIA: 0
PHOTOPHOBIA: 0
NERVOUS/ANXIOUS: 0
ABDOMINAL DISTENTION: 0
CONSTITUTIONAL NEGATIVE: 1
RECTAL PAIN: 0
COLOR CHANGE: 0
RHINORRHEA: 0
DIARRHEA: 0
APPETITE CHANGE: 0
FATIGUE: 0
SHORTNESS OF BREATH: 0
ABDOMINAL PAIN: 0
MYALGIAS: 0
FLANK PAIN: 0
SEIZURES: 0
HEMATURIA: 0
TROUBLE SWALLOWING: 0
SLEEP DISTURBANCE: 1
SORE THROAT: 0
COUGH: 0
FEVER: 0
DIZZINESS: 0
DYSPHORIC MOOD: 0
CHEST TIGHTNESS: 0
SINUS PAIN: 0
SINUS PRESSURE: 0
DYSURIA: 0
ADENOPATHY: 0

## 2024-07-23 NOTE — PROGRESS NOTES
Subjective   Patient ID: Raissa Puentes is a 74 y.o. female who presents for Insomnia.    PHONE CALL     Consent obtained by Raissa Puentes for audio communication. Total time for this audio communication visit is 12 minutes.     Information given by caregiver Trisha     Patient presents today to follow up on insomnia and agitation. Patient was started on Seroquel at last visit. States she has been sleeping well and her agitation has improved a little, but will have days that she is refusing anything. Does not have motivation to want to move and refuses to get out of chair at times.          Review of Systems   Constitutional: Negative.  Negative for activity change, appetite change, fatigue and fever.   HENT:  Negative for congestion, dental problem, ear discharge, ear pain, mouth sores, rhinorrhea, sinus pressure, sinus pain, sore throat, tinnitus and trouble swallowing.    Eyes:  Negative for photophobia, pain and visual disturbance.   Respiratory:  Negative for cough, chest tightness, shortness of breath and stridor.    Cardiovascular:  Negative for chest pain and palpitations.   Gastrointestinal:  Negative for abdominal distention, abdominal pain, blood in stool, constipation, diarrhea and rectal pain.   Endocrine: Negative for cold intolerance, heat intolerance, polydipsia, polyphagia and polyuria.   Genitourinary:  Negative for dysuria, flank pain, hematuria and urgency.   Musculoskeletal:  Positive for arthralgias, back pain, gait problem and neck stiffness. Negative for myalgias.   Skin:  Negative for color change and rash.   Allergic/Immunologic: Negative for environmental allergies and food allergies.   Neurological:  Negative for dizziness, seizures, syncope, speech difficulty and headaches.   Hematological:  Negative for adenopathy.   Psychiatric/Behavioral:  Positive for agitation, confusion, decreased concentration and sleep disturbance. Negative for dysphoric mood. The patient is not  nervous/anxious.        Objective   There were no vitals taken for this visit.    Physical Exam  Neurological:      Mental Status: She is disoriented.   Psychiatric:      Comments: Flat affect         Constitutional: Well developed, well nourished, alert and in no acute distress   Psychiatric: Mood calm and affect normal    Telephone Visit - Audio Communication Only    Assessment/Plan   Problem List Items Addressed This Visit             ICD-10-CM    Type 2 diabetes mellitus without complication, without long-term current use of insulin (Multi) E11.9    Early onset Alzheimer's dementia without behavioral disturbance (Multi) G30.0, F02.80    Hypertension - Primary I10    Memory changes R41.3     Consent obtained by Raissa Puentes for audio communication. Total time for this audio communication visit is 12 minutes.     Provider Attestation - Scribe documentation    All medical record entries made by the Scribe were at my direction and personally dictated by me. I have reviewed the chart and agree that the record accurately reflects my personal performance of the history, physical exam, discussion and plan.

## 2024-07-28 ASSESSMENT — ENCOUNTER SYMPTOMS
CONFUSION: 1
BACK PAIN: 1
NECK STIFFNESS: 1
ARTHRALGIAS: 1
DECREASED CONCENTRATION: 1

## 2024-07-28 NOTE — PATIENT INSTRUCTIONS
Follow up in 4 weeks    Continue current medications and therapy for chronic medical conditions.    Patient was advised importance of proper diet/nutrition in addition adequate hydration. Patient was encouraged moderate exercise program to include 30 minutes daily for 5 days of the week or 150 minutes weekly. Patient will follow-up with us as scheduled.    I personally reviewed the OARRS report for this patient. I have considered the risks of abuse, dependence, addiction, and diversion.    UDS/CSA:    Continue Seroquel 25 mg 1/2 tablet twice daily with 1 tablet nightly    Consider Rexulti on follow-up visit

## 2024-08-01 ENCOUNTER — APPOINTMENT (OUTPATIENT)
Dept: BEHAVIORAL HEALTH | Facility: CLINIC | Age: 74
End: 2024-08-01
Payer: MEDICARE

## 2024-08-01 VITALS — HEART RATE: 103 BPM | DIASTOLIC BLOOD PRESSURE: 71 MMHG | SYSTOLIC BLOOD PRESSURE: 112 MMHG

## 2024-08-01 DIAGNOSIS — F51.04 PSYCHOPHYSIOLOGICAL INSOMNIA: ICD-10-CM

## 2024-08-01 DIAGNOSIS — F02.C18: ICD-10-CM

## 2024-08-01 DIAGNOSIS — G30.9: ICD-10-CM

## 2024-08-01 PROCEDURE — 3074F SYST BP LT 130 MM HG: CPT | Performed by: PSYCHIATRY & NEUROLOGY

## 2024-08-01 PROCEDURE — 90792 PSYCH DIAG EVAL W/MED SRVCS: CPT | Performed by: PSYCHIATRY & NEUROLOGY

## 2024-08-01 PROCEDURE — 1160F RVW MEDS BY RX/DR IN RCRD: CPT | Performed by: PSYCHIATRY & NEUROLOGY

## 2024-08-01 PROCEDURE — 1123F ACP DISCUSS/DSCN MKR DOCD: CPT | Performed by: PSYCHIATRY & NEUROLOGY

## 2024-08-01 PROCEDURE — 1159F MED LIST DOCD IN RCRD: CPT | Performed by: PSYCHIATRY & NEUROLOGY

## 2024-08-01 PROCEDURE — 3078F DIAST BP <80 MM HG: CPT | Performed by: PSYCHIATRY & NEUROLOGY

## 2024-08-01 ASSESSMENT — ENCOUNTER SYMPTOMS
CONFUSION: 1
DYSPHORIC MOOD: 0
HYPERACTIVE: 0
DECREASED CONCENTRATION: 1
AGITATION: 1
HALLUCINATIONS: 1
SLEEP DISTURBANCE: 1
NERVOUS/ANXIOUS: 0

## 2024-08-01 NOTE — PROGRESS NOTES
Subjective   Patient ID: Raissa Puentes is a 74 y.o. female who presents for No chief complaint on file..     and daughter were with her   Seroquel 12.5 mg po bid and 25 at bedtime for few weeks as she was uncooperative with care, agitation, fidgety, not sleeping at night   Diagnosed with Alzheimer`s for 7 years.   Nurses are there 12 hours a day. She needs assistance in basic activities of daily living.   She does not verbalize well, a word or two. That is within the last couple of years. No history of stroke   PCP added Seroquel lately, seems to help with behavior       Current Outpatient Medications on File Prior to Visit   Medication Sig Dispense Refill    Blood glucose monitoring meter kit kit TEST 2-3 TIMES DAILY 1 each 0    blood sugar diagnostic (FreeStyle Test) strip TEST 2-3 TIMES DAILY 100 strip 1    buPROPion SR (Wellbutrin SR) 150 mg 12 hr tablet Take 1 tablet (150 mg) by mouth 2 times a day. (Patient taking differently: Take 1 tablet (150 mg) by mouth once daily.) 180 tablet 1    calcium carbonate-vitamin D3 500 mg-5 mcg (200 unit) tablet Take 1 tablet by mouth once daily.      celecoxib (CeleBREX) 100 mg capsule Take 1 capsule (100 mg) by mouth once daily.      citalopram (CeleXA) 20 mg tablet Take 1 tablet (20 mg) by mouth once daily. (Patient taking differently: Take 0.5 tablets (10 mg) by mouth once daily.) 90 tablet 1    donepezil (Aricept) 10 mg tablet Take 1 tablet (10 mg) by mouth 2 times a day. 180 tablet 1    fluticasone (Flonase) 50 mcg/actuation nasal spray Administer 1 spray into each nostril once daily.      glipiZIDE XL (Glucotrol XL) 10 mg 24 hr tablet Take 1 tablet (10 mg) by mouth once daily. Do not crush, chew, or split.      lancets misc TEST 2-3 TIMES DAILY 100 each 1    losartan (Cozaar) 25 mg tablet Take 1 tablet (25 mg) by mouth once daily. 30 tablet 2    memantine (Namenda) 10 mg tablet Take 1 tablet (10 mg) by mouth 2 times a day. (Patient taking differently: Take 1  tablet (10 mg) by mouth once daily.) 180 tablet 1    QUEtiapine (SEROquel) 25 mg tablet Take 1 tablet (25 mg) by mouth 2 times a day. (Patient taking differently: Take 1 tablet (25 mg) by mouth 2 times a day. 12.5 mg po bid and 25 mg po at bedtime) 60 tablet 0    semaglutide (Ozempic) 0.25 mg or 0.5 mg (2 mg/3 mL) pen injector Inject 0.25 mg under the skin every 7 days. 3 mL 0    simvastatin (Zocor) 40 mg tablet Take 1 tablet (40 mg) by mouth once daily. 90 tablet 1    pioglitazone (Actos) 45 mg tablet Take 1 tablet (45 mg) by mouth once daily. 90 tablet 1     No current facility-administered medications on file prior to visit.      Patient Active Problem List   Diagnosis    Type 2 diabetes mellitus without complication, without long-term current use of insulin (Multi)    Balance problem    Carotid stenosis    Depression    Early onset Alzheimer's dementia without behavioral disturbance (Multi)    Gait abnormality    Hypertension    Hyperlipidemia    Lumbar facet arthropathy    Memory changes    Osteoarthritis of both hands    Osteoarthritis of spine with radiculopathy, lumbar region    Pernicious anemia    Recurrent cold sores    Varicose veins of bilateral lower extremities with other complications    Medicare annual wellness visit, subsequent    Low back pain    Severe major neurocognitive disorder due to Alzheimer's disease with behavioral disturbance (Multi)      Past Psychiatric History:  No past inpatient admissions, no history of suicide or self harm   History of depression prior to the diagnosis Alzheimer`s. Took Wellbutrin for years   Substance Abuse History:  None  Family History:  None  Social History:  Social History     Socioeconomic History    Marital status:      Spouse name: Not on file    Number of children: Not on file    Years of education: Not on file    Highest education level: Not on file   Occupational History    Not on file   Tobacco Use    Smoking status: Never    Smokeless tobacco:  Never   Substance and Sexual Activity    Alcohol use: Not Currently    Drug use: Never    Sexual activity: Not on file   Other Topics Concern    Not on file   Social History Narrative    Not on file     Social Determinants of Health     Financial Resource Strain: Not on file   Food Insecurity: Not on file   Transportation Needs: Not on file   Physical Activity: Not on file   Stress: Not on file   Social Connections: Not on file   Intimate Partner Violence: Not on file   Housing Stability: Not on file        3 children,  for 52 years. Currently lives with  in [place].    is POA and guardian              Review of Systems   Genitourinary:         Urinary incontinence    Musculoskeletal:  Positive for gait problem.   Psychiatric/Behavioral:  Positive for agitation, behavioral problems, confusion, decreased concentration, hallucinations and sleep disturbance. Negative for dysphoric mood, self-injury and suicidal ideas. The patient is not nervous/anxious and is not hyperactive.        Objective   Vitals:    08/01/24 1327   BP: 112/71   Pulse: 103      Physical Exam  Psychiatric:         Attention and Perception: She is inattentive.         Mood and Affect: Affect is flat.         Speech: She is noncommunicative.         Behavior: Behavior is slowed.         Thought Content: Thought content normal.         Cognition and Memory: Cognition is impaired.         Judgment: Judgment is impulsive and inappropriate.         Lab Review:   Lab on 07/03/2024   Component Date Value    WBC 07/03/2024 8.5     nRBC 07/03/2024 0.0     RBC 07/03/2024 4.31     Hemoglobin 07/03/2024 13.5     Hematocrit 07/03/2024 41.7     MCV 07/03/2024 97     MCH 07/03/2024 31.3     MCHC 07/03/2024 32.4     RDW 07/03/2024 13.0     Platelets 07/03/2024 236     Neutrophils % 07/03/2024 66.6     Immature Granulocytes %,* 07/03/2024 0.1     Lymphocytes % 07/03/2024 22.4     Monocytes % 07/03/2024 7.1     Eosinophils % 07/03/2024 2.9      Basophils % 07/03/2024 0.9     Neutrophils Absolute 07/03/2024 5.68 (H)     Immature Granulocytes Ab* 07/03/2024 0.01     Lymphocytes Absolute 07/03/2024 1.91     Monocytes Absolute 07/03/2024 0.61     Eosinophils Absolute 07/03/2024 0.25     Basophils Absolute 07/03/2024 0.08     Glucose 07/03/2024 138 (H)     Sodium 07/03/2024 141     Potassium 07/03/2024 4.3     Chloride 07/03/2024 103     Bicarbonate 07/03/2024 31     Anion Gap 07/03/2024 11     Urea Nitrogen 07/03/2024 19     Creatinine 07/03/2024 0.58     eGFR 07/03/2024 >90     Calcium 07/03/2024 10.2     Albumin 07/03/2024 4.2     Alkaline Phosphatase 07/03/2024 104     Total Protein 07/03/2024 6.8     AST 07/03/2024 25     Bilirubin, Total 07/03/2024 0.5     ALT 07/03/2024 19     Cholesterol 07/03/2024 141     HDL-Cholesterol 07/03/2024 55.8     Cholesterol/HDL Ratio 07/03/2024 2.5     LDL Calculated 07/03/2024 63     VLDL 07/03/2024 23     Triglycerides 07/03/2024 113     Non HDL Cholesterol 07/03/2024 85     Thyroid Stimulating Horm* 07/03/2024 1.00     Iron 07/03/2024 78     UIBC 07/03/2024 311     TIBC 07/03/2024 389     % Saturation 07/03/2024 20 (L)     Folate, Serum 07/03/2024 14.9     Vitamin B12 07/03/2024 591     Creatine Kinase 07/03/2024 68     Magnesium 07/03/2024 1.79     Vitamin D, 25-Hydroxy, T* 07/03/2024 40    Office Visit on 07/02/2024   Component Date Value    POC Color, Urine 07/02/2024 Yellow     POC Appearance, Urine 07/02/2024 Clear     POC Glucose, Urine 07/02/2024 NEGATIVE     POC Bilirubin, Urine 07/02/2024 NEGATIVE     POC Ketones, Urine 07/02/2024 NEGATIVE     POC Specific Gravity, Ur* 07/02/2024 >=1.030     POC Blood, Urine 07/02/2024 NEGATIVE     POC PH, Urine 07/02/2024 5.5     POC Protein, Urine 07/02/2024 NEGATIVE     POC Urobilinogen, Urine 07/02/2024 0.2     Poc Nitrite, Urine 07/02/2024 NEGATIVE     POC Leukocytes, Urine 07/02/2024 TRACE (A)    Office Visit on 05/30/2024   Component Date Value    POC HEMOGLOBIN A1c  05/30/2024 6.7 (A)     POC Fingerstick Blood Gl* 05/30/2024 140 (A)    Office Visit on 02/29/2024   Component Date Value    POC Fingerstick Blood Gl* 02/29/2024 225 (A)     POC HEMOGLOBIN A1c 02/29/2024 7.5 (A)      Lab Results   Component Value Date     07/03/2024     11/28/2022     08/17/2022     01/25/2021    K 4.3 07/03/2024    K 4.1 11/28/2022    K 3.8 08/17/2022    K 3.8 01/25/2021    CO2 31 07/03/2024    CO2 30 11/28/2022    CO2 31 08/17/2022    CO2 29 01/25/2021    BUN 19 07/03/2024    BUN 14 11/28/2022    BUN 17 08/17/2022    BUN 17 01/25/2021    CREATININE 0.58 07/03/2024    CREATININE 0.61 11/28/2022    CREATININE 0.58 08/17/2022    CREATININE 0.59 01/25/2021    GLUCOSE 138 (H) 07/03/2024    GLUCOSE 110 (H) 11/28/2022    GLUCOSE 186 (H) 08/17/2022    GLUCOSE 91 01/25/2021    CALCIUM 10.2 07/03/2024    CALCIUM 10.1 11/28/2022    CALCIUM 10.0 08/17/2022    CALCIUM 10.0 01/25/2021     Lab Results   Component Value Date    WBC 8.5 07/03/2024    HGB 13.5 07/03/2024    HCT 41.7 07/03/2024    MCV 97 07/03/2024     07/03/2024     Lab Results   Component Value Date    CHOL 141 07/03/2024    TRIG 113 07/03/2024    HDL 55.8 07/03/2024       Assessment/Plan   Problem List Items Addressed This Visit       Severe major neurocognitive disorder due to Alzheimer's disease with behavioral disturbance (Multi)     Other Visit Diagnoses       Psychophysiological insomnia              Discussed pharmacological and non pharmacological interventions   Discussed risks and benefits of meds specifically FDA black box warning for the use of antipsychotics in dementia   Sx are stable now after Seroquel added by PCP.   May follow up with PCP or with me as needed  Psychoeducation done     Donato Chapman MD

## 2024-08-13 ENCOUNTER — LAB (OUTPATIENT)
Dept: LAB | Facility: LAB | Age: 74
End: 2024-08-13
Payer: MEDICARE

## 2024-08-13 DIAGNOSIS — N39.0 URINARY TRACT INFECTION WITHOUT HEMATURIA, SITE UNSPECIFIED: ICD-10-CM

## 2024-08-13 PROCEDURE — 81001 URINALYSIS AUTO W/SCOPE: CPT

## 2024-08-13 NOTE — TELEPHONE ENCOUNTER
YELENA PAUL, CAREGIVER IS REQUESTING LAB ORDER FOR A POSSIBLE UTI. SHE WOULD LIKE TO  THE KIT FROM THE LAB AND DROP OFF THE SAMPLE.    [phone]       I DO NOT SEE THIS PERSON DOCUMENTED TO BE PT CONTACT

## 2024-08-14 LAB
APPEARANCE UR: CLEAR
BACTERIA #/AREA URNS AUTO: ABNORMAL /HPF
BILIRUB UR STRIP.AUTO-MCNC: NEGATIVE MG/DL
CAOX CRY #/AREA UR COMP ASSIST: ABNORMAL /HPF
COLOR UR: ABNORMAL
GLUCOSE UR STRIP.AUTO-MCNC: NORMAL MG/DL
KETONES UR STRIP.AUTO-MCNC: NEGATIVE MG/DL
LEUKOCYTE ESTERASE UR QL STRIP.AUTO: ABNORMAL
NITRITE UR QL STRIP.AUTO: ABNORMAL
PH UR STRIP.AUTO: 5.5 [PH]
PROT UR STRIP.AUTO-MCNC: NEGATIVE MG/DL
RBC # UR STRIP.AUTO: NEGATIVE /UL
RBC #/AREA URNS AUTO: ABNORMAL /HPF
SP GR UR STRIP.AUTO: 1.01
UROBILINOGEN UR STRIP.AUTO-MCNC: NORMAL MG/DL
WBC #/AREA URNS AUTO: ABNORMAL /HPF
WBC CLUMPS #/AREA URNS AUTO: ABNORMAL /HPF

## 2024-08-14 RX ORDER — NITROFURANTOIN 25; 75 MG/1; MG/1
100 CAPSULE ORAL 2 TIMES DAILY
Qty: 20 CAPSULE | Refills: 0 | Status: SHIPPED | OUTPATIENT
Start: 2024-08-14 | End: 2024-08-21

## 2024-08-15 ENCOUNTER — TELEPHONE (OUTPATIENT)
Dept: PRIMARY CARE | Facility: CLINIC | Age: 74
End: 2024-08-15
Payer: MEDICARE

## 2024-08-15 NOTE — TELEPHONE ENCOUNTER
Can someone please call pt's daughter Taylor in regards to this medication?  nitrofurantoin, macrocrystal-monohydrate, (Macrobid) 100 mg capsule   Pharmacist told her there may be an interaction with pt's allergies and she just wants to clarify.  [phone] (Mobile)      Pulses equal bilaterally, no edema present.

## 2024-08-15 NOTE — TELEPHONE ENCOUNTER
Spoke with daughter per FLACO that patient should not have any interactions and the medication is okay to take

## 2024-08-23 ENCOUNTER — PATIENT OUTREACH (OUTPATIENT)
Dept: PRIMARY CARE | Facility: CLINIC | Age: 74
End: 2024-08-23
Payer: MEDICARE

## 2024-08-23 DIAGNOSIS — F02.80 EARLY ONSET ALZHEIMER'S DEMENTIA WITHOUT BEHAVIORAL DISTURBANCE (MULTI): ICD-10-CM

## 2024-08-23 DIAGNOSIS — R45.4 IRRITABILITY: ICD-10-CM

## 2024-08-23 DIAGNOSIS — I10 PRIMARY HYPERTENSION: ICD-10-CM

## 2024-08-23 DIAGNOSIS — E11.9 TYPE 2 DIABETES MELLITUS WITHOUT COMPLICATION, WITHOUT LONG-TERM CURRENT USE OF INSULIN (MULTI): ICD-10-CM

## 2024-08-23 DIAGNOSIS — F51.04 PSYCHOPHYSIOLOGICAL INSOMNIA: ICD-10-CM

## 2024-08-23 DIAGNOSIS — G30.0 EARLY ONSET ALZHEIMER'S DEMENTIA WITHOUT BEHAVIORAL DISTURBANCE (MULTI): ICD-10-CM

## 2024-08-23 NOTE — PROGRESS NOTES
Spoke with daughter Taylor Goel has been doing really well overall  She is taking seroquel every night  Getting much better sleep  Seems more rested and less confused during the day  Doing seroquel as need during the day    Finished macrobid   UTI symptoms seem fully resolved  Taylor is considering doing D-Mannose supplement    Alida and Samanta are caregivers  Taylor and Kenan agree that they would like them added to HIPAA form.    Aware they can update that this Thursday at appointment with Dr. Alegria

## 2024-08-24 RX ORDER — QUETIAPINE FUMARATE 25 MG/1
25 TABLET, FILM COATED ORAL 2 TIMES DAILY
Qty: 60 TABLET | Refills: 1 | Status: SHIPPED | OUTPATIENT
Start: 2024-08-24 | End: 2025-02-20

## 2024-08-26 RX ORDER — QUETIAPINE FUMARATE 25 MG/1
25 TABLET, FILM COATED ORAL 2 TIMES DAILY
Qty: 60 TABLET | Refills: 2 | Status: SHIPPED | OUTPATIENT
Start: 2024-08-26

## 2024-08-26 NOTE — TELEPHONE ENCOUNTER
Dr Alegria pt    Pt phoned office and is requesting refill on     Seroquel 25 mg    [facility] [address]    Pt is on last pill today.

## 2024-08-29 ENCOUNTER — APPOINTMENT (OUTPATIENT)
Dept: PRIMARY CARE | Facility: CLINIC | Age: 74
End: 2024-08-29
Payer: MEDICARE

## 2024-08-29 VITALS
OXYGEN SATURATION: 91 % | HEART RATE: 95 BPM | DIASTOLIC BLOOD PRESSURE: 70 MMHG | BODY MASS INDEX: 22.82 KG/M2 | WEIGHT: 137 LBS | HEIGHT: 65 IN | SYSTOLIC BLOOD PRESSURE: 122 MMHG

## 2024-08-29 DIAGNOSIS — E11.9 TYPE 2 DIABETES MELLITUS WITHOUT COMPLICATION, WITHOUT LONG-TERM CURRENT USE OF INSULIN (MULTI): ICD-10-CM

## 2024-08-29 DIAGNOSIS — G30.9: ICD-10-CM

## 2024-08-29 DIAGNOSIS — F02.C18: ICD-10-CM

## 2024-08-29 DIAGNOSIS — R07.89 OTHER CHEST PAIN: ICD-10-CM

## 2024-08-29 DIAGNOSIS — I10 PRIMARY HYPERTENSION: Primary | ICD-10-CM

## 2024-08-29 LAB
POC FINGERSTICK BLOOD GLUCOSE: 142 MG/DL (ref 70–100)
POC HEMOGLOBIN A1C: 8.2 % (ref 4.2–6.5)

## 2024-08-29 ASSESSMENT — ENCOUNTER SYMPTOMS
HEADACHES: 0
LOSS OF SENSATION IN FEET: 0
RHINORRHEA: 0
OCCASIONAL FEELINGS OF UNSTEADINESS: 1
PHOTOPHOBIA: 0
SINUS PRESSURE: 0
SHORTNESS OF BREATH: 0
SLEEP DISTURBANCE: 0
STRIDOR: 0
EYE PAIN: 0
SPEECH DIFFICULTY: 0
NERVOUS/ANXIOUS: 0
COUGH: 0
HEMATURIA: 0
FLANK PAIN: 0
ABDOMINAL DISTENTION: 0
SINUS PAIN: 0
ABDOMINAL PAIN: 0
DIZZINESS: 0
NECK STIFFNESS: 0
SORE THROAT: 0
FATIGUE: 0
DECREASED CONCENTRATION: 0
CONSTIPATION: 0
RECTAL PAIN: 0
DYSURIA: 0
CHEST TIGHTNESS: 0
POLYDIPSIA: 0
MYALGIAS: 0
APPETITE CHANGE: 0
PALPITATIONS: 0
DYSPHORIC MOOD: 0
ADENOPATHY: 0
AGITATION: 0
ACTIVITY CHANGE: 0
DEPRESSION: 0
CONSTITUTIONAL NEGATIVE: 1
FEVER: 0
POLYPHAGIA: 0
TROUBLE SWALLOWING: 0
BLOOD IN STOOL: 0
DIARRHEA: 0
COLOR CHANGE: 0
SEIZURES: 0

## 2024-08-29 ASSESSMENT — PATIENT HEALTH QUESTIONNAIRE - PHQ9
2. FEELING DOWN, DEPRESSED OR HOPELESS: NOT AT ALL
SUM OF ALL RESPONSES TO PHQ9 QUESTIONS 1 AND 2: 0
1. LITTLE INTEREST OR PLEASURE IN DOING THINGS: NOT AT ALL

## 2024-08-29 ASSESSMENT — ACTIVITIES OF DAILY LIVING (ADL)
MANAGING_FINANCES: TOTAL CARE
BATHING: DEPENDENT
DOING_HOUSEWORK: TOTAL CARE
DRESSING: DEPENDENT
TAKING_MEDICATION: TOTAL CARE
GROCERY_SHOPPING: TOTAL CARE

## 2024-08-29 NOTE — PROGRESS NOTES
Subjective   Reason for Visit: Raissa Puentes is an 74 y.o. female here for a Medicare Wellness visit.     Past Medical, Surgical, and Family History reviewed and updated in chart.     Patient comes in for follow-up on diabetes mellitus type 2.  Family admits that patient has been doing well with his blood sugars and has had no hypoglycemic episodes.    Family has questions regarding Seroquel.  Patient is doing well with present dose, however, is not always given dosages during the daytime    HPI  Patient following up on irritability and psychophysiological insomnia  Has been taking seroquel 12.5 mg daily prn and 25 mg at bedtime  Daughter states she is sleeping better, communicating better, and maneuvering/coordination has improved.    Follow up diabetes  Monitors glucose every morning  Takes ozempic 0.25 mg weekly, glipizide XL 10 mg daily, and actos 45 mg daily with no complications  States the levels have been within normal range the last few weeks  Denies hypoglycemic episodes   Currently on statin therapy    Patient Care Team:  Elier Alegria DO as PCP - General (Family Medicine)  Elier Alegria DO as PCP - [facility] Attributed [facility]  Jaqueline Davenport RN as Care Manager (Case Management)     Review of Systems   Constitutional: Negative.  Negative for activity change, appetite change, fatigue and fever.   HENT:  Negative for congestion, dental problem, ear discharge, ear pain, mouth sores, rhinorrhea, sinus pressure, sinus pain, sore throat, tinnitus and trouble swallowing.    Eyes:  Negative for photophobia, pain and visual disturbance.   Respiratory:  Negative for cough, chest tightness, shortness of breath and stridor.    Cardiovascular:  Negative for chest pain and palpitations.   Gastrointestinal:  Negative for abdominal distention, abdominal pain, blood in stool, constipation, diarrhea and rectal pain.   Endocrine: Negative for cold intolerance, heat intolerance, polydipsia, polyphagia and  "polyuria.   Genitourinary:  Negative for dysuria, flank pain, hematuria and urgency.   Musculoskeletal:  Positive for arthralgias, back pain and gait problem. Negative for myalgias and neck stiffness.   Skin:  Negative for color change and rash.   Allergic/Immunologic: Negative for environmental allergies and food allergies.   Neurological:  Negative for dizziness, seizures, syncope, speech difficulty and headaches.   Hematological:  Negative for adenopathy.   Psychiatric/Behavioral:  Positive for confusion. Negative for agitation, decreased concentration, dysphoric mood and sleep disturbance. The patient is not nervous/anxious.        Objective   Vitals:  /70 (BP Location: Right arm, Patient Position: Sitting, BP Cuff Size: Adult)   Pulse 95   Ht 1.651 m (5' 5\")   Wt 62.1 kg (137 lb)   SpO2 91%   BMI 22.80 kg/m²       Physical Exam  Vitals reviewed.   Constitutional:       General: She is not in acute distress.     Appearance: Normal appearance. She is normal weight. She is not ill-appearing or diaphoretic.   HENT:      Head: Normocephalic.      Right Ear: Tympanic membrane and external ear normal.      Left Ear: Tympanic membrane and external ear normal.      Nose: Nose normal. No congestion.      Mouth/Throat:      Pharynx: No posterior oropharyngeal erythema.   Eyes:      General:         Right eye: No discharge.         Left eye: No discharge.      Extraocular Movements: Extraocular movements intact.      Conjunctiva/sclera: Conjunctivae normal.      Pupils: Pupils are equal, round, and reactive to light.   Cardiovascular:      Rate and Rhythm: Normal rate and regular rhythm.      Pulses: Normal pulses.      Heart sounds: Normal heart sounds. No murmur heard.  Pulmonary:      Effort: Pulmonary effort is normal. No respiratory distress.      Breath sounds: Normal breath sounds. No wheezing or rales.   Chest:      Chest wall: No tenderness.   Abdominal:      General: Abdomen is flat. Bowel sounds are " normal. There is no distension.      Palpations: There is no mass.      Tenderness: There is no abdominal tenderness. There is no guarding.   Musculoskeletal:         General: No tenderness. Normal range of motion.      Cervical back: Normal range of motion and neck supple. No tenderness.      Right lower leg: No edema.      Left lower leg: No edema.   Skin:     General: Skin is dry.      Coloration: Skin is not jaundiced.      Findings: No bruising, erythema or rash.   Neurological:      General: No focal deficit present.      Mental Status: She is alert. Mental status is at baseline.      Cranial Nerves: No cranial nerve deficit.      Sensory: No sensory deficit.      Coordination: Coordination abnormal.      Gait: Gait abnormal.      Comments: Progressive memory loss with most recent and remote memory impairment         Assessment/Plan   Problem List Items Addressed This Visit             ICD-10-CM    Type 2 diabetes mellitus without complication, without long-term current use of insulin (Multi) E11.9    Relevant Orders    POCT glycosylated hemoglobin (Hb A1C) manually resulted (Completed)    POCT fingerstick glucose manually resulted (Completed)    Follow Up In [facility] - PCP - Established    Hypertension - Primary I10    Severe major neurocognitive disorder due to Alzheimer's disease with behavioral disturbance (Multi) G30.9, F02.C18     Other Visit Diagnoses         Codes    Other chest pain     R07.89    Relevant Orders    ECG 12 lead (Clinic Performed) (Completed)              Advance Directives Discussion  16 - 20 minutes were spent discussing Advanced Care Planning (including a Living Will, Medical Power Of , as well as specific end of life choices and/or directives). The details of that discussion were documented in Advanced Directives Discussion section of the medical record.       Scribe Attestation  By signing my name below, Tanika FAY RMA , Scribe   attest that this  documentation has been prepared under the direction and in the presence of Elier Alegria DO.   Provider Attestation - Scribe documentation    All medical record entries made by the Scribe were at my direction and personally dictated by me. I have reviewed the chart and agree that the record accurately reflects my personal performance of the history, physical exam, discussion and plan.

## 2024-08-30 ASSESSMENT — ENCOUNTER SYMPTOMS
BACK PAIN: 1
CONFUSION: 1
ARTHRALGIAS: 1

## 2024-08-30 NOTE — PATIENT INSTRUCTIONS
Follow up in 2 months    Continue current medications and therapy for chronic medical conditions.    Patient was advised importance of proper diet/nutrition in addition adequate hydration. Patient was encouraged moderate exercise program to include 30 minutes daily for 5 days of the week or 150 minutes weekly. Patient will follow-up with us as scheduled.    I personally reviewed the OARRS report for this patient. I have considered the risks of abuse, dependence, addiction, and diversion.    UDS/CSA:    Review laboratory results from July 2024    In office Accu-Chek and hemoglobin A1c    In office EKG    Continue Seroquel 25 mg nightly with administration of 1/2 tablet every morning and every afternoon as needed    Home health care    Referred to advanced primary care manager

## 2024-09-05 ENCOUNTER — TELEPHONE (OUTPATIENT)
Dept: PRIMARY CARE | Facility: CLINIC | Age: 74
End: 2024-09-05

## 2024-09-05 ENCOUNTER — LAB (OUTPATIENT)
Dept: LAB | Facility: LAB | Age: 74
End: 2024-09-05
Payer: MEDICARE

## 2024-09-05 DIAGNOSIS — N39.0 FREQUENT UTI: ICD-10-CM

## 2024-09-05 DIAGNOSIS — R35.0 URINARY FREQUENCY: ICD-10-CM

## 2024-09-05 LAB
APPEARANCE UR: CLEAR
BACTERIA #/AREA URNS AUTO: ABNORMAL /HPF
BILIRUB UR STRIP.AUTO-MCNC: NEGATIVE MG/DL
CAOX CRY #/AREA UR COMP ASSIST: ABNORMAL /HPF
COLOR UR: YELLOW
GLUCOSE UR STRIP.AUTO-MCNC: NORMAL MG/DL
KETONES UR STRIP.AUTO-MCNC: NEGATIVE MG/DL
LEUKOCYTE ESTERASE UR QL STRIP.AUTO: ABNORMAL
NITRITE UR QL STRIP.AUTO: ABNORMAL
PH UR STRIP.AUTO: 5.5 [PH]
PROT UR STRIP.AUTO-MCNC: ABNORMAL MG/DL
RBC # UR STRIP.AUTO: NEGATIVE /UL
RBC #/AREA URNS AUTO: ABNORMAL /HPF
SP GR UR STRIP.AUTO: 1.02
SQUAMOUS #/AREA URNS AUTO: ABNORMAL /HPF
UROBILINOGEN UR STRIP.AUTO-MCNC: NORMAL MG/DL
WBC #/AREA URNS AUTO: ABNORMAL /HPF
WBC CLUMPS #/AREA URNS AUTO: ABNORMAL /HPF

## 2024-09-05 PROCEDURE — 81001 URINALYSIS AUTO W/SCOPE: CPT

## 2024-09-05 PROCEDURE — 87086 URINE CULTURE/COLONY COUNT: CPT

## 2024-09-05 NOTE — TELEPHONE ENCOUNTER
Spoke with Samanta and let her know orders are in the system to be completed.     She asked for a standing order for a urinalysis to be placed as well. Standing order placed for the year

## 2024-09-05 NOTE — TELEPHONE ENCOUNTER
PT OF [facility]     PT CAREGIVER YELENA PAUL [phone] CALLED IN FOR AN UA REQ FOR PT SO SEE IF SHE HAS AN UTI    PLEASE CALL YELENA WHEN ORDER IS READY    SHE WILL TAKE THE SAMPLE TO AN CLOSE LAB

## 2024-09-06 ENCOUNTER — PATIENT MESSAGE (OUTPATIENT)
Dept: PRIMARY CARE | Facility: CLINIC | Age: 74
End: 2024-09-06
Payer: MEDICARE

## 2024-09-06 DIAGNOSIS — R35.0 URINARY FREQUENCY: ICD-10-CM

## 2024-09-06 DIAGNOSIS — N39.0 FREQUENT UTI: ICD-10-CM

## 2024-09-06 LAB — BACTERIA UR CULT: ABNORMAL

## 2024-09-06 RX ORDER — AMOXICILLIN AND CLAVULANATE POTASSIUM 875; 125 MG/1; MG/1
875 TABLET, FILM COATED ORAL 2 TIMES DAILY
Qty: 20 TABLET | Refills: 0 | Status: SHIPPED | OUTPATIENT
Start: 2024-09-06 | End: 2024-09-16

## 2024-09-09 ENCOUNTER — PATIENT OUTREACH (OUTPATIENT)
Dept: PRIMARY CARE | Facility: CLINIC | Age: 74
End: 2024-09-09
Payer: MEDICARE

## 2024-09-09 DIAGNOSIS — N39.0 RECURRENT UTI: ICD-10-CM

## 2024-09-09 NOTE — PROGRESS NOTES
"Discharge Facility: [facility]  Discharge Diagnosis: Metabolic encephalopathy; acute cystitis; pneumonitis  Admission Date: 9/6/24  Discharge Date: 9/7/24    PCP Appointment Date: 11/6/24   Specialist Appointment Date: would like to establish with urology  Hospital Encounter and Summary: Linked   See Discharge Assessment below for further details.    SUMMARY OF WHAT HAPPENED WHILE I WAS IN THE HOSPITAL:   Raissa Puentes is a 74 year old female with PMH of dementia, DM 2, HLD, HTN who presents to the ED accompanied by caregiver with concern for altered mental status and UTI.     Admission, patient was hemodynamically stable afebrile. Labs showed no leukocytosis. UA was suggestive for UTI, urine culture \"was drawn on 9/5\" showed E. coli. Patient was started on ceftriaxone, after which mentation seemed to go back to baseline. Patient will be discharged Keflex for 5 more days.  Of note, CT abdomen showed Right lower lobe infiltrate with generalized bibasilar airspace opacities concerning for pneumonitis. that was thought to be possibly silent aspiration pneumonitis. Did not have any clinical evidence of bacterial pneumonia.     Medications  Medications reviewed with patient/caregiver?: Yes (9/9/2024  2:06 PM)  Is the patient having any side effects they believe may be caused by any medication additions or changes?: No (9/9/2024  2:06 PM)  Does the patient have all medications ordered at discharge?: Yes (9/9/2024  2:06 PM)  Care Management Interventions: No intervention needed (9/9/2024  2:06 PM)    Appointments  Does the patient have a primary care provider?: Yes (9/9/2024  2:06 PM)  Care Management Interventions: Verified appointment date/time/provider (9/9/2024  2:06 PM)  Has the patient kept scheduled appointments due by today?: Yes (9/9/2024  2:06 PM)    Self Management  What is the home health agency?: Home with private care (9/9/2024  2:06 PM)    Patient Teaching  Does the patient have access to their discharge " instructions?: Yes (9/9/2024  2:06 PM)  Care Management Interventions: Reviewed instructions with patient (9/9/2024  2:06 PM)  What is the patient's perception of their health status since discharge?: Improving (9/9/2024  2:06 PM)    Wrap Up  Wrap Up Additional Comments: Spoke with caregiver Rich Goel is doing much better. Mental status back to baseline. Taking antibiotics and tolerating well. Would like to establish with urology for frequent UTI's. Urology referral formatted. Recommended Dr. Huffman's practice, they will call to schedule. (9/9/2024  2:06 PM)

## 2024-09-18 ENCOUNTER — APPOINTMENT (OUTPATIENT)
Dept: UROLOGY | Facility: CLINIC | Age: 74
End: 2024-09-18
Payer: MEDICARE

## 2024-09-21 ENCOUNTER — PATIENT MESSAGE (OUTPATIENT)
Dept: PRIMARY CARE | Facility: CLINIC | Age: 74
End: 2024-09-21
Payer: MEDICARE

## 2024-09-21 DIAGNOSIS — N39.0 RECURRENT UTI: ICD-10-CM

## 2024-09-25 ENCOUNTER — LAB (OUTPATIENT)
Dept: LAB | Facility: LAB | Age: 74
End: 2024-09-25
Payer: MEDICARE

## 2024-09-25 DIAGNOSIS — N39.0 RECURRENT UTI: ICD-10-CM

## 2024-09-25 LAB
APPEARANCE UR: CLEAR
BACTERIA #/AREA URNS AUTO: ABNORMAL /HPF
BILIRUB UR STRIP.AUTO-MCNC: NEGATIVE MG/DL
CAOX CRY #/AREA UR COMP ASSIST: ABNORMAL /HPF
COLOR UR: ABNORMAL
GLUCOSE UR STRIP.AUTO-MCNC: NORMAL MG/DL
KETONES UR STRIP.AUTO-MCNC: NEGATIVE MG/DL
LEUKOCYTE ESTERASE UR QL STRIP.AUTO: ABNORMAL
NITRITE UR QL STRIP.AUTO: ABNORMAL
PH UR STRIP.AUTO: 5 [PH]
PROT UR STRIP.AUTO-MCNC: NEGATIVE MG/DL
RBC # UR STRIP.AUTO: NEGATIVE /UL
RBC #/AREA URNS AUTO: ABNORMAL /HPF
SP GR UR STRIP.AUTO: 1.02
SQUAMOUS #/AREA URNS AUTO: ABNORMAL /HPF
UROBILINOGEN UR STRIP.AUTO-MCNC: NORMAL MG/DL
WBC #/AREA URNS AUTO: ABNORMAL /HPF
WBC CLUMPS #/AREA URNS AUTO: ABNORMAL /HPF

## 2024-09-25 PROCEDURE — 81001 URINALYSIS AUTO W/SCOPE: CPT

## 2024-09-25 PROCEDURE — 87086 URINE CULTURE/COLONY COUNT: CPT

## 2024-09-26 ENCOUNTER — PATIENT MESSAGE (OUTPATIENT)
Dept: PRIMARY CARE | Facility: CLINIC | Age: 74
End: 2024-09-26
Payer: MEDICARE

## 2024-09-26 DIAGNOSIS — E11.9 TYPE 2 DIABETES MELLITUS WITHOUT COMPLICATION, WITHOUT LONG-TERM CURRENT USE OF INSULIN (MULTI): ICD-10-CM

## 2024-09-26 DIAGNOSIS — N39.0 CHRONIC UTI: ICD-10-CM

## 2024-09-26 DIAGNOSIS — N39.0 RECURRENT UTI: ICD-10-CM

## 2024-09-26 RX ORDER — AMPICILLIN 500 MG/1
CAPSULE ORAL
Qty: 21 CAPSULE | Refills: 2 | Status: SHIPPED | OUTPATIENT
Start: 2024-09-26

## 2024-09-27 ENCOUNTER — PATIENT OUTREACH (OUTPATIENT)
Dept: PRIMARY CARE | Facility: CLINIC | Age: 74
End: 2024-09-27
Payer: MEDICARE

## 2024-09-27 DIAGNOSIS — I10 PRIMARY HYPERTENSION: ICD-10-CM

## 2024-09-27 DIAGNOSIS — E11.9 TYPE 2 DIABETES MELLITUS WITHOUT COMPLICATION, WITHOUT LONG-TERM CURRENT USE OF INSULIN (MULTI): ICD-10-CM

## 2024-09-27 DIAGNOSIS — N39.0 RECURRENT UTI: ICD-10-CM

## 2024-09-27 LAB — BACTERIA UR CULT: ABNORMAL

## 2024-09-27 NOTE — PROGRESS NOTES
CCM outreach with pt identified by name and .     LOV: 24  NOV: 24     Health Maintenance Due: not discussed today    Spoke with Coreen, caregiver and , Kenan  Discussed urine culture results  Amando has not been started on ampicillin yet  As Felicita Perdomo did not have it in store  Supposed to  this afternoon    Encouraged them to repeat urine culture  To better treat infection  Purewick sample is contaminated  Challenging because often Amando has bowel movement while going to bathroom on toilet  But they will attempt to collect with hat    Overall Amando is doing well at this time  Her mental status has been at baseline  No extra agitation  Or change in gait  Appetite has been good  Energy to get out of the house  Coreen does note cloudy, foul smelling urine    Coreen would also like to schedule with Infectious Disease  Gave her phone number for Dr. Davin Faith    Doing well on Seroquel prescription  1/2 am 1/2 afternoon   Full tab at bedtime

## 2024-09-30 ENCOUNTER — LAB (OUTPATIENT)
Dept: LAB | Facility: LAB | Age: 74
End: 2024-09-30
Payer: MEDICARE

## 2024-09-30 DIAGNOSIS — N39.0 FREQUENT UTI: ICD-10-CM

## 2024-09-30 DIAGNOSIS — R35.0 URINARY FREQUENCY: ICD-10-CM

## 2024-09-30 DIAGNOSIS — N39.0 RECURRENT UTI: ICD-10-CM

## 2024-09-30 LAB
APPEARANCE UR: CLEAR
BACTERIA #/AREA URNS AUTO: ABNORMAL /HPF
BILIRUB UR STRIP.AUTO-MCNC: NEGATIVE MG/DL
CAOX CRY #/AREA UR COMP ASSIST: ABNORMAL /HPF
COLOR UR: ABNORMAL
GLUCOSE UR STRIP.AUTO-MCNC: NORMAL MG/DL
KETONES UR STRIP.AUTO-MCNC: NEGATIVE MG/DL
LEUKOCYTE ESTERASE UR QL STRIP.AUTO: ABNORMAL
NITRITE UR QL STRIP.AUTO: NEGATIVE
PH UR STRIP.AUTO: 5.5 [PH]
PROT UR STRIP.AUTO-MCNC: NEGATIVE MG/DL
RBC # UR STRIP.AUTO: NEGATIVE /UL
RBC #/AREA URNS AUTO: ABNORMAL /HPF
SP GR UR STRIP.AUTO: 1.03
SQUAMOUS #/AREA URNS AUTO: ABNORMAL /HPF
UROBILINOGEN UR STRIP.AUTO-MCNC: NORMAL MG/DL
WBC #/AREA URNS AUTO: ABNORMAL /HPF
YEAST BUDDING #/AREA UR COMP ASSIST: PRESENT /HPF

## 2024-09-30 PROCEDURE — 81001 URINALYSIS AUTO W/SCOPE: CPT

## 2024-09-30 PROCEDURE — 87086 URINE CULTURE/COLONY COUNT: CPT

## 2024-10-01 ENCOUNTER — LAB (OUTPATIENT)
Dept: LAB | Facility: LAB | Age: 74
End: 2024-10-01
Payer: MEDICARE

## 2024-10-01 ENCOUNTER — TELEPHONE (OUTPATIENT)
Dept: PRIMARY CARE | Facility: CLINIC | Age: 74
End: 2024-10-01
Payer: MEDICARE

## 2024-10-01 DIAGNOSIS — N39.0 RECURRENT UTI: ICD-10-CM

## 2024-10-01 DIAGNOSIS — N39.0 CHRONIC UTI: ICD-10-CM

## 2024-10-01 DIAGNOSIS — E11.9 TYPE 2 DIABETES MELLITUS WITHOUT COMPLICATION, WITHOUT LONG-TERM CURRENT USE OF INSULIN (MULTI): ICD-10-CM

## 2024-10-01 LAB
ALBUMIN SERPL BCP-MCNC: 4.1 G/DL (ref 3.4–5)
ALP SERPL-CCNC: 115 U/L (ref 33–136)
ALT SERPL W P-5'-P-CCNC: 16 U/L (ref 7–45)
ANION GAP SERPL CALC-SCNC: 12 MMOL/L (ref 10–20)
AST SERPL W P-5'-P-CCNC: 24 U/L (ref 9–39)
BACTERIA UR CULT: ABNORMAL
BASOPHILS # BLD AUTO: 0.07 X10*3/UL (ref 0–0.1)
BASOPHILS NFR BLD AUTO: 0.9 %
BILIRUB SERPL-MCNC: 0.5 MG/DL (ref 0–1.2)
BUN SERPL-MCNC: 17 MG/DL (ref 6–23)
CALCIUM SERPL-MCNC: 10.3 MG/DL (ref 8.6–10.3)
CHLORIDE SERPL-SCNC: 106 MMOL/L (ref 98–107)
CO2 SERPL-SCNC: 28 MMOL/L (ref 21–32)
CREAT SERPL-MCNC: 0.54 MG/DL (ref 0.5–1.05)
EGFRCR SERPLBLD CKD-EPI 2021: >90 ML/MIN/1.73M*2
EOSINOPHIL # BLD AUTO: 0.39 X10*3/UL (ref 0–0.4)
EOSINOPHIL NFR BLD AUTO: 5.2 %
ERYTHROCYTE [DISTWIDTH] IN BLOOD BY AUTOMATED COUNT: 13.5 % (ref 11.5–14.5)
GLUCOSE SERPL-MCNC: 144 MG/DL (ref 74–99)
HCT VFR BLD AUTO: 42.3 % (ref 36–46)
HGB BLD-MCNC: 13.5 G/DL (ref 12–16)
IMM GRANULOCYTES # BLD AUTO: 0.02 X10*3/UL (ref 0–0.5)
IMM GRANULOCYTES NFR BLD AUTO: 0.3 % (ref 0–0.9)
LYMPHOCYTES # BLD AUTO: 2.27 X10*3/UL (ref 0.8–3)
LYMPHOCYTES NFR BLD AUTO: 30.5 %
MCH RBC QN AUTO: 30.9 PG (ref 26–34)
MCHC RBC AUTO-ENTMCNC: 31.9 G/DL (ref 32–36)
MCV RBC AUTO: 97 FL (ref 80–100)
MONOCYTES # BLD AUTO: 0.46 X10*3/UL (ref 0.05–0.8)
MONOCYTES NFR BLD AUTO: 6.2 %
NEUTROPHILS # BLD AUTO: 4.24 X10*3/UL (ref 1.6–5.5)
NEUTROPHILS NFR BLD AUTO: 56.9 %
NRBC BLD-RTO: 0 /100 WBCS (ref 0–0)
PLATELET # BLD AUTO: 235 X10*3/UL (ref 150–450)
POTASSIUM SERPL-SCNC: 4.2 MMOL/L (ref 3.5–5.3)
PROT SERPL-MCNC: 6.6 G/DL (ref 6.4–8.2)
RBC # BLD AUTO: 4.37 X10*6/UL (ref 4–5.2)
SODIUM SERPL-SCNC: 142 MMOL/L (ref 136–145)
TSH SERPL-ACNC: 1.84 MIU/L (ref 0.44–3.98)
WBC # BLD AUTO: 7.5 X10*3/UL (ref 4.4–11.3)

## 2024-10-01 PROCEDURE — 36415 COLL VENOUS BLD VENIPUNCTURE: CPT

## 2024-10-01 PROCEDURE — 85025 COMPLETE CBC W/AUTO DIFF WBC: CPT

## 2024-10-01 PROCEDURE — 84443 ASSAY THYROID STIM HORMONE: CPT

## 2024-10-01 PROCEDURE — 80053 COMPREHEN METABOLIC PANEL: CPT

## 2024-10-08 ENCOUNTER — APPOINTMENT (OUTPATIENT)
Dept: RADIOLOGY | Facility: HOSPITAL | Age: 74
End: 2024-10-08
Payer: MEDICARE

## 2024-10-08 ENCOUNTER — HOSPITAL ENCOUNTER (OUTPATIENT)
Facility: HOSPITAL | Age: 74
Setting detail: OBSERVATION
Discharge: NURSING, REHAB OR LONG-TERM CARE | End: 2024-10-11
Attending: EMERGENCY MEDICINE
Payer: MEDICARE

## 2024-10-08 DIAGNOSIS — F02.B0 MODERATE DEMENTIA ASSOCIATED WITH OTHER UNDERLYING DISEASE, UNSPECIFIED WHETHER BEHAVIORAL, PSYCHOTIC, OR MOOD DISTURBANCE OR ANXIETY: ICD-10-CM

## 2024-10-08 DIAGNOSIS — F33.41 RECURRENT MAJOR DEPRESSIVE DISORDER, IN PARTIAL REMISSION (CMS-HCC): ICD-10-CM

## 2024-10-08 DIAGNOSIS — F51.04 PSYCHOPHYSIOLOGICAL INSOMNIA: ICD-10-CM
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
## 2024-10-08 DIAGNOSIS — S42.201A CLOSED FRACTURE OF PROXIMAL END OF RIGHT HUMERUS, UNSPECIFIED FRACTURE MORPHOLOGY, INITIAL ENCOUNTER: ICD-10-CM
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
## 2024-10-08 DIAGNOSIS — R53.1 WEAKNESS: ICD-10-CM

## 2024-10-08 DIAGNOSIS — Z91.81 AT RISK FOR FALLING: ICD-10-CM

## 2024-10-08 DIAGNOSIS — R45.4 IRRITABILITY: ICD-10-CM

## 2024-10-08 LAB
ALBUMIN SERPL BCP-MCNC: 3.9 G/DL (ref 3.4–5)
ALP SERPL-CCNC: 84 U/L (ref 33–136)
ALT SERPL W P-5'-P-CCNC: 23 U/L (ref 7–45)
ANION GAP SERPL CALC-SCNC: 11 MMOL/L (ref 10–20)
AST SERPL W P-5'-P-CCNC: 22 U/L (ref 9–39)
BASOPHILS # BLD AUTO: 0.05 X10*3/UL (ref 0–0.1)
BASOPHILS NFR BLD AUTO: 0.4 %
BILIRUB SERPL-MCNC: 0.8 MG/DL (ref 0–1.2)
BUN SERPL-MCNC: 15 MG/DL (ref 6–23)
CALCIUM SERPL-MCNC: 9.9 MG/DL (ref 8.6–10.3)
CHLORIDE SERPL-SCNC: 106 MMOL/L (ref 98–107)
CO2 SERPL-SCNC: 29 MMOL/L (ref 21–32)
CREAT SERPL-MCNC: 0.58 MG/DL (ref 0.5–1.05)
EGFRCR SERPLBLD CKD-EPI 2021: >90 ML/MIN/1.73M*2
EOSINOPHIL # BLD AUTO: 0.08 X10*3/UL (ref 0–0.4)
EOSINOPHIL NFR BLD AUTO: 0.6 %
ERYTHROCYTE [DISTWIDTH] IN BLOOD BY AUTOMATED COUNT: 14 % (ref 11.5–14.5)
GLUCOSE SERPL-MCNC: 201 MG/DL (ref 74–99)
HCT VFR BLD AUTO: 36.4 % (ref 36–46)
HGB BLD-MCNC: 11.4 G/DL (ref 12–16)
HOLD SPECIMEN: NORMAL
IMM GRANULOCYTES # BLD AUTO: 0.03 X10*3/UL (ref 0–0.5)
IMM GRANULOCYTES NFR BLD AUTO: 0.2 % (ref 0–0.9)
INR PPP: 1.1 (ref 0.9–1.1)
LYMPHOCYTES # BLD AUTO: 1.38 X10*3/UL (ref 0.8–3)
LYMPHOCYTES NFR BLD AUTO: 11.1 %
MAGNESIUM SERPL-MCNC: 1.66 MG/DL (ref 1.6–2.4)
MCH RBC QN AUTO: 30.4 PG (ref 26–34)
MCHC RBC AUTO-ENTMCNC: 31.3 G/DL (ref 32–36)
MCV RBC AUTO: 97 FL (ref 80–100)
MONOCYTES # BLD AUTO: 1.27 X10*3/UL (ref 0.05–0.8)
MONOCYTES NFR BLD AUTO: 10.3 %
NEUTROPHILS # BLD AUTO: 9.57 X10*3/UL (ref 1.6–5.5)
NEUTROPHILS NFR BLD AUTO: 77.4 %
NRBC BLD-RTO: 0 /100 WBCS (ref 0–0)
PLATELET # BLD AUTO: 201 X10*3/UL (ref 150–450)
POTASSIUM SERPL-SCNC: 3.7 MMOL/L (ref 3.5–5.3)
PROT SERPL-MCNC: 6.6 G/DL (ref 6.4–8.2)
PROTHROMBIN TIME: 12.2 SECONDS (ref 9.8–12.8)
RBC # BLD AUTO: 3.75 X10*6/UL (ref 4–5.2)
SODIUM SERPL-SCNC: 142 MMOL/L (ref 136–145)
WBC # BLD AUTO: 12.4 X10*3/UL (ref 4.4–11.3)

## 2024-10-08 PROCEDURE — 74177 CT ABD & PELVIS W/CONTRAST: CPT

## 2024-10-08 PROCEDURE — 72131 CT LUMBAR SPINE W/O DYE: CPT | Mod: RCN

## 2024-10-08 PROCEDURE — 99285 EMERGENCY DEPT VISIT HI MDM: CPT | Mod: 25

## 2024-10-08 PROCEDURE — 73564 X-RAY EXAM KNEE 4 OR MORE: CPT | Mod: LT

## 2024-10-08 PROCEDURE — 96375 TX/PRO/DX INJ NEW DRUG ADDON: CPT

## 2024-10-08 PROCEDURE — 2550000001 HC RX 255 CONTRASTS: Performed by: EMERGENCY MEDICINE

## 2024-10-08 PROCEDURE — 70450 CT HEAD/BRAIN W/O DYE: CPT

## 2024-10-08 PROCEDURE — 73110 X-RAY EXAM OF WRIST: CPT | Mod: RT

## 2024-10-08 PROCEDURE — 72131 CT LUMBAR SPINE W/O DYE: CPT | Mod: RCN | Performed by: STUDENT IN AN ORGANIZED HEALTH CARE EDUCATION/TRAINING PROGRAM

## 2024-10-08 PROCEDURE — 96374 THER/PROPH/DIAG INJ IV PUSH: CPT | Mod: 59

## 2024-10-08 PROCEDURE — 80053 COMPREHEN METABOLIC PANEL: CPT

## 2024-10-08 PROCEDURE — 85025 COMPLETE CBC W/AUTO DIFF WBC: CPT

## 2024-10-08 PROCEDURE — 2500000002 HC RX 250 W HCPCS SELF ADMINISTERED DRUGS (ALT 637 FOR MEDICARE OP, ALT 636 FOR OP/ED): Mod: MUE

## 2024-10-08 PROCEDURE — 73610 X-RAY EXAM OF ANKLE: CPT | Mod: LEFT SIDE | Performed by: RADIOLOGY

## 2024-10-08 PROCEDURE — G0378 HOSPITAL OBSERVATION PER HR: HCPCS

## 2024-10-08 PROCEDURE — 85610 PROTHROMBIN TIME: CPT

## 2024-10-08 PROCEDURE — 74177 CT ABD & PELVIS W/CONTRAST: CPT | Performed by: STUDENT IN AN ORGANIZED HEALTH CARE EDUCATION/TRAINING PROGRAM

## 2024-10-08 PROCEDURE — 83735 ASSAY OF MAGNESIUM: CPT

## 2024-10-08 PROCEDURE — 73610 X-RAY EXAM OF ANKLE: CPT | Mod: LT

## 2024-10-08 PROCEDURE — 73030 X-RAY EXAM OF SHOULDER: CPT | Mod: RIGHT SIDE | Performed by: RADIOLOGY

## 2024-10-08 PROCEDURE — 73030 X-RAY EXAM OF SHOULDER: CPT | Mod: RT

## 2024-10-08 PROCEDURE — 72170 X-RAY EXAM OF PELVIS: CPT | Performed by: RADIOLOGY

## 2024-10-08 PROCEDURE — 2500000004 HC RX 250 GENERAL PHARMACY W/ HCPCS (ALT 636 FOR OP/ED)

## 2024-10-08 PROCEDURE — 73564 X-RAY EXAM KNEE 4 OR MORE: CPT | Mod: LEFT SIDE | Performed by: RADIOLOGY

## 2024-10-08 PROCEDURE — 72125 CT NECK SPINE W/O DYE: CPT

## 2024-10-08 PROCEDURE — 36415 COLL VENOUS BLD VENIPUNCTURE: CPT

## 2024-10-08 PROCEDURE — 70450 CT HEAD/BRAIN W/O DYE: CPT | Performed by: STUDENT IN AN ORGANIZED HEALTH CARE EDUCATION/TRAINING PROGRAM

## 2024-10-08 PROCEDURE — 99222 1ST HOSP IP/OBS MODERATE 55: CPT

## 2024-10-08 PROCEDURE — 96372 THER/PROPH/DIAG INJ SC/IM: CPT

## 2024-10-08 PROCEDURE — 72170 X-RAY EXAM OF PELVIS: CPT

## 2024-10-08 PROCEDURE — 72125 CT NECK SPINE W/O DYE: CPT | Performed by: STUDENT IN AN ORGANIZED HEALTH CARE EDUCATION/TRAINING PROGRAM

## 2024-10-08 PROCEDURE — 73110 X-RAY EXAM OF WRIST: CPT | Mod: RIGHT SIDE | Performed by: RADIOLOGY

## 2024-10-08 RX ORDER — QUETIAPINE FUMARATE 25 MG/1
25 TABLET, FILM COATED ORAL NIGHTLY
Status: DISCONTINUED | OUTPATIENT
Start: 2024-10-08 | End: 2024-10-11 | Stop reason: HOSPADM

## 2024-10-08 RX ORDER — MORPHINE SULFATE 4 MG/ML
4 INJECTION, SOLUTION INTRAMUSCULAR; INTRAVENOUS ONCE
Status: COMPLETED | OUTPATIENT
Start: 2024-10-08 | End: 2024-10-08

## 2024-10-08 RX ORDER — POLYETHYLENE GLYCOL 3350 17 G/17G
17 POWDER, FOR SOLUTION ORAL DAILY
Status: DISCONTINUED | OUTPATIENT
Start: 2024-10-09 | End: 2024-10-11 | Stop reason: HOSPADM

## 2024-10-08 RX ORDER — MEMANTINE HYDROCHLORIDE 10 MG/1
10 TABLET ORAL DAILY
Status: DISCONTINUED | OUTPATIENT
Start: 2024-10-09 | End: 2024-10-11 | Stop reason: HOSPADM

## 2024-10-08 RX ORDER — CITALOPRAM 10 MG/1
10 TABLET ORAL DAILY
Status: DISCONTINUED | OUTPATIENT
Start: 2024-10-09 | End: 2024-10-11 | Stop reason: HOSPADM

## 2024-10-08 RX ORDER — OXYCODONE HYDROCHLORIDE 5 MG/1
5 TABLET ORAL EVERY 4 HOURS PRN
Status: DISCONTINUED | OUTPATIENT
Start: 2024-10-08 | End: 2024-10-11 | Stop reason: HOSPADM

## 2024-10-08 RX ORDER — LOSARTAN POTASSIUM 25 MG/1
25 TABLET ORAL DAILY
Status: DISCONTINUED | OUTPATIENT
Start: 2024-10-09 | End: 2024-10-11 | Stop reason: HOSPADM

## 2024-10-08 RX ORDER — ENOXAPARIN SODIUM 100 MG/ML
40 INJECTION SUBCUTANEOUS EVERY 24 HOURS
Status: DISCONTINUED | OUTPATIENT
Start: 2024-10-08 | End: 2024-10-11 | Stop reason: HOSPADM

## 2024-10-08 RX ORDER — BUPROPION HYDROCHLORIDE 150 MG/1
150 TABLET, EXTENDED RELEASE ORAL DAILY
Status: DISCONTINUED | OUTPATIENT
Start: 2024-10-09 | End: 2024-10-11 | Stop reason: HOSPADM

## 2024-10-08 RX ORDER — OXYCODONE HYDROCHLORIDE 5 MG/1
7.5 TABLET ORAL EVERY 4 HOURS PRN
Status: DISCONTINUED | OUTPATIENT
Start: 2024-10-08 | End: 2024-10-11 | Stop reason: HOSPADM

## 2024-10-08 RX ORDER — ACETAMINOPHEN 325 MG/1
975 TABLET ORAL EVERY 8 HOURS PRN
Status: DISCONTINUED | OUTPATIENT
Start: 2024-10-08 | End: 2024-10-11 | Stop reason: HOSPADM

## 2024-10-08 SDOH — HEALTH STABILITY: MENTAL HEALTH
DO YOU FEEL STRESS - TENSE, RESTLESS, NERVOUS, OR ANXIOUS, OR UNABLE TO SLEEP AT NIGHT BECAUSE YOUR MIND IS TROUBLED ALL THE TIME - THESE DAYS?: PATIENT UNABLE TO ANSWER

## 2024-10-08 SDOH — SOCIAL STABILITY: SOCIAL NETWORK
DO YOU BELONG TO ANY CLUBS OR ORGANIZATIONS SUCH AS CHURCH GROUPS UNIONS, FRATERNAL OR ATHLETIC GROUPS, OR SCHOOL GROUPS?: PATIENT UNABLE TO ANSWER

## 2024-10-08 SDOH — SOCIAL STABILITY: SOCIAL INSECURITY
WITHIN THE LAST YEAR, HAVE YOU BEEN KICKED, HIT, SLAPPED, OR OTHERWISE PHYSICALLY HURT BY YOUR PARTNER OR EX-PARTNER?: PATIENT UNABLE TO ANSWER

## 2024-10-08 SDOH — ECONOMIC STABILITY: FOOD INSECURITY
WITHIN THE PAST 12 MONTHS, THE FOOD YOU BOUGHT JUST DIDN'T LAST AND YOU DIDN'T HAVE MONEY TO GET MORE.: PATIENT UNABLE TO ANSWER

## 2024-10-08 SDOH — ECONOMIC STABILITY: INCOME INSECURITY
IN THE PAST 12 MONTHS, HAS THE ELECTRIC, GAS, OIL, OR WATER COMPANY THREATENED TO SHUT OFF SERVICE IN YOUR HOME?: PATIENT UNABLE TO ANSWER

## 2024-10-08 SDOH — ECONOMIC STABILITY: HOUSING INSECURITY: AT ANY TIME IN THE PAST 12 MONTHS, WERE YOU HOMELESS OR LIVING IN A SHELTER (INCLUDING NOW)?: PATIENT UNABLE TO ANSWER

## 2024-10-08 SDOH — SOCIAL STABILITY: SOCIAL NETWORK: ARE YOU MARRIED, WIDOWED, DIVORCED, SEPARATED, NEVER MARRIED, OR LIVING WITH A PARTNER?: PATIENT UNABLE TO ANSWER

## 2024-10-08 SDOH — ECONOMIC STABILITY: TRANSPORTATION INSECURITY
IN THE PAST 12 MONTHS, HAS THE LACK OF TRANSPORTATION KEPT YOU FROM MEDICAL APPOINTMENTS OR FROM GETTING MEDICATIONS?: PATIENT UNABLE TO ANSWER

## 2024-10-08 SDOH — SOCIAL STABILITY: SOCIAL INSECURITY: DOES ANYONE TRY TO KEEP YOU FROM HAVING/CONTACTING OTHER FRIENDS OR DOING THINGS OUTSIDE YOUR HOME?: UNABLE TO ASSESS

## 2024-10-08 SDOH — SOCIAL STABILITY: SOCIAL INSECURITY

## 2024-10-08 SDOH — HEALTH STABILITY: PHYSICAL HEALTH
ON AVERAGE, HOW MANY DAYS PER WEEK DO YOU ENGAGE IN MODERATE TO STRENUOUS EXERCISE (LIKE A BRISK WALK)?: PATIENT UNABLE TO ANSWER

## 2024-10-08 SDOH — SOCIAL STABILITY: SOCIAL INSECURITY
WITHIN THE LAST YEAR, HAVE YOU BEEN HUMILIATED OR EMOTIONALLY ABUSED IN OTHER WAYS BY YOUR PARTNER OR EX-PARTNER?: PATIENT UNABLE TO ANSWER

## 2024-10-08 SDOH — ECONOMIC STABILITY: FOOD INSECURITY
WITHIN THE PAST 12 MONTHS, YOU WORRIED THAT YOUR FOOD WOULD RUN OUT BEFORE YOU GOT MONEY TO BUY MORE.: PATIENT UNABLE TO ANSWER

## 2024-10-08 SDOH — HEALTH STABILITY: MENTAL HEALTH
HOW OFTEN DO YOU NEED TO HAVE SOMEONE HELP YOU WHEN YOU READ INSTRUCTIONS, PAMPHLETS, OR OTHER WRITTEN MATERIAL FROM YOUR DOCTOR OR PHARMACY?: PATIENT UNABLE TO RESPOND

## 2024-10-08 SDOH — HEALTH STABILITY: MENTAL HEALTH: HOW MANY STANDARD DRINKS CONTAINING ALCOHOL DO YOU HAVE ON A TYPICAL DAY?: PATIENT UNABLE TO ANSWER

## 2024-10-08 SDOH — ECONOMIC STABILITY: INCOME INSECURITY
HOW HARD IS IT FOR YOU TO PAY FOR THE VERY BASICS LIKE FOOD, HOUSING, MEDICAL CARE, AND HEATING?: PATIENT UNABLE TO ANSWER

## 2024-10-08 SDOH — HEALTH STABILITY: PHYSICAL HEALTH: ON AVERAGE, HOW MANY MINUTES DO YOU ENGAGE IN EXERCISE AT THIS LEVEL?: PATIENT UNABLE TO ANSWER

## 2024-10-08 SDOH — SOCIAL STABILITY: SOCIAL NETWORK: IN A TYPICAL WEEK, HOW MANY TIMES DO YOU TALK ON THE PHONE WITH FAMILY, FRIENDS, OR NEIGHBORS?: PATIENT UNABLE TO ANSWER

## 2024-10-08 SDOH — SOCIAL STABILITY: SOCIAL INSECURITY: ARE YOU OR HAVE YOU BEEN THREATENED OR ABUSED PHYSICALLY, EMOTIONALLY, OR SEXUALLY BY ANYONE?: UNABLE TO ASSESS

## 2024-10-08 SDOH — SOCIAL STABILITY: SOCIAL INSECURITY: POSSIBLE ABUSE REPORTED TO:: OTHER (COMMENT)

## 2024-10-08 SDOH — ECONOMIC STABILITY: TRANSPORTATION INSECURITY
IN THE PAST 12 MONTHS, HAS LACK OF TRANSPORTATION KEPT YOU FROM MEETINGS, WORK, OR FROM GETTING THINGS NEEDED FOR DAILY LIVING?: PATIENT UNABLE TO ANSWER

## 2024-10-08 SDOH — SOCIAL STABILITY: SOCIAL NETWORK: HOW OFTEN DO YOU ATTEND MEETINGS OF THE CLUBS OR ORGANIZATIONS YOU BELONG TO?: PATIENT UNABLE TO ANSWER

## 2024-10-08 SDOH — SOCIAL STABILITY: SOCIAL INSECURITY: HAVE YOU HAD ANY THOUGHTS OF HARMING ANYONE ELSE?: UNABLE TO ASSESS

## 2024-10-08 SDOH — ECONOMIC STABILITY: INCOME INSECURITY
IN THE PAST 12 MONTHS HAS THE ELECTRIC, GAS, OIL, OR WATER COMPANY THREATENED TO SHUT OFF SERVICES IN YOUR HOME?: PATIENT UNABLE TO ANSWER

## 2024-10-08 SDOH — SOCIAL STABILITY: SOCIAL INSECURITY: WERE YOU ABLE TO COMPLETE ALL THE BEHAVIORAL HEALTH SCREENINGS?: NO

## 2024-10-08 SDOH — HEALTH STABILITY: MENTAL HEALTH: HOW OFTEN DO YOU HAVE A DRINK CONTAINING ALCOHOL?: PATIENT UNABLE TO ANSWER

## 2024-10-08 SDOH — SOCIAL STABILITY: SOCIAL INSECURITY
WITHIN THE LAST YEAR, HAVE TO BEEN RAPED OR FORCED TO HAVE ANY KIND OF SEXUAL ACTIVITY BY YOUR PARTNER OR EX-PARTNER?: PATIENT UNABLE TO ANSWER

## 2024-10-08 SDOH — ECONOMIC STABILITY: HOUSING INSECURITY
IN THE LAST 12 MONTHS, WAS THERE A TIME WHEN YOU WERE NOT ABLE TO PAY THE MORTGAGE OR RENT ON TIME?: PATIENT UNABLE TO ANSWER

## 2024-10-08 SDOH — HEALTH STABILITY: MENTAL HEALTH: HOW MANY DRINKS CONTAINING ALCOHOL DO YOU HAVE ON A TYPICAL DAY WHEN YOU ARE DRINKING?: PATIENT UNABLE TO ANSWER

## 2024-10-08 SDOH — SOCIAL STABILITY: SOCIAL NETWORK: HOW OFTEN DO YOU ATTEND CHURCH OR RELIGIOUS SERVICES?: PATIENT UNABLE TO ANSWER

## 2024-10-08 SDOH — SOCIAL STABILITY: SOCIAL NETWORK: HOW OFTEN DO YOU GET TOGETHER WITH FRIENDS OR RELATIVES?: PATIENT UNABLE TO ANSWER

## 2024-10-08 SDOH — ECONOMIC STABILITY: HOUSING INSECURITY

## 2024-10-08 SDOH — SOCIAL STABILITY: SOCIAL INSECURITY: DO YOU FEEL ANYONE HAS EXPLOITED OR TAKEN ADVANTAGE OF YOU FINANCIALLY OR OF YOUR PERSONAL PROPERTY?: UNABLE TO ASSESS

## 2024-10-08 SDOH — SOCIAL STABILITY: SOCIAL INSECURITY: WITHIN THE LAST YEAR, HAVE YOU BEEN AFRAID OF YOUR PARTNER OR EX-PARTNER?: PATIENT UNABLE TO ANSWER

## 2024-10-08 SDOH — ECONOMIC STABILITY: FOOD INSECURITY
WITHIN THE PAST 12 MONTHS, YOU WORRIED THAT YOUR FOOD WOULD RUN OUT BEFORE YOU GOT THE MONEY TO BUY MORE.: PATIENT UNABLE TO ANSWER

## 2024-10-08 SDOH — SOCIAL STABILITY: SOCIAL INSECURITY
WITHIN THE LAST YEAR, HAVE YOU BEEN RAPED OR FORCED TO HAVE ANY KIND OF SEXUAL ACTIVITY BY YOUR PARTNER OR EX-PARTNER?: PATIENT UNABLE TO ANSWER

## 2024-10-08 SDOH — SOCIAL STABILITY: SOCIAL INSECURITY: ARE YOU MARRIED, WIDOWED, DIVORCED, SEPARATED, NEVER MARRIED, OR LIVING WITH A PARTNER?: PATIENT UNABLE TO ANSWER

## 2024-10-08 SDOH — ECONOMIC STABILITY: TRANSPORTATION INSECURITY
IN THE PAST 12 MONTHS, HAS LACK OF TRANSPORTATION KEPT YOU FROM MEDICAL APPOINTMENTS OR FROM GETTING MEDICATIONS?: PATIENT UNABLE TO ANSWER

## 2024-10-08 SDOH — HEALTH STABILITY: MENTAL HEALTH: HOW OFTEN DO YOU HAVE 6 OR MORE DRINKS ON ONE OCCASION?: PATIENT UNABLE TO ANSWER

## 2024-10-08 SDOH — HEALTH STABILITY: MENTAL HEALTH
STRESS IS WHEN SOMEONE FEELS TENSE, NERVOUS, ANXIOUS, OR CAN'T SLEEP AT NIGHT BECAUSE THEIR MIND IS TROUBLED. HOW STRESSED ARE YOU?: PATIENT UNABLE TO ANSWER

## 2024-10-08 SDOH — SOCIAL STABILITY: SOCIAL INSECURITY: ARE THERE ANY APPARENT SIGNS OF INJURIES/BEHAVIORS THAT COULD BE RELATED TO ABUSE/NEGLECT?: UNABLE TO ASSESS

## 2024-10-08 SDOH — HEALTH STABILITY: MENTAL HEALTH: HOW OFTEN DO YOU HAVE SIX OR MORE DRINKS ON ONE OCCASION?: PATIENT UNABLE TO ANSWER

## 2024-10-08 SDOH — SOCIAL STABILITY: SOCIAL INSECURITY: ABUSE: ADULT

## 2024-10-08 SDOH — HEALTH STABILITY: MENTAL HEALTH: EXPERIENCED ANY OF THE FOLLOWING LIFE EVENTS: OTHER (COMMENT)

## 2024-10-08 SDOH — SOCIAL STABILITY: SOCIAL INSECURITY: DO YOU FEEL UNSAFE GOING BACK TO THE PLACE WHERE YOU ARE LIVING?: UNABLE TO ASSESS

## 2024-10-08 SDOH — SOCIAL STABILITY: SOCIAL NETWORK: HOW OFTEN DO YOU ATTENT MEETINGS OF THE CLUB OR ORGANIZATION YOU BELONG TO?: PATIENT UNABLE TO ANSWER

## 2024-10-08 SDOH — SOCIAL STABILITY: SOCIAL NETWORK
DO YOU BELONG TO ANY CLUBS OR ORGANIZATIONS SUCH AS CHURCH GROUPS, UNIONS, FRATERNAL OR ATHLETIC GROUPS, OR SCHOOL GROUPS?: PATIENT UNABLE TO ANSWER

## 2024-10-08 SDOH — SOCIAL STABILITY: SOCIAL INSECURITY: HAS ANYONE EVER THREATENED TO HURT YOUR FAMILY OR YOUR PETS?: UNABLE TO ASSESS

## 2024-10-08 ASSESSMENT — ACTIVITIES OF DAILY LIVING (ADL)
TOILETING: DEPENDENT
DRESSING YOURSELF: DEPENDENT
PATIENT'S MEMORY ADEQUATE TO SAFELY COMPLETE DAILY ACTIVITIES?: NO
GROOMING: DEPENDENT
LACK_OF_TRANSPORTATION: PATIENT UNABLE TO ANSWER
FEEDING YOURSELF: DEPENDENT
WALKS IN HOME: NEEDS ASSISTANCE
HEARING - LEFT EAR: FUNCTIONAL
HEARING - RIGHT EAR: FUNCTIONAL
JUDGMENT_ADEQUATE_SAFELY_COMPLETE_DAILY_ACTIVITIES: NO
BATHING: DEPENDENT
LACK_OF_TRANSPORTATION: PATIENT UNABLE TO ANSWER
ADEQUATE_TO_COMPLETE_ADL: YES
ASSISTIVE_DEVICE: OTHER (COMMENT)

## 2024-10-08 ASSESSMENT — COGNITIVE AND FUNCTIONAL STATUS - GENERAL
MOVING TO AND FROM BED TO CHAIR: A LOT
STANDING UP FROM CHAIR USING ARMS: TOTAL
DRESSING REGULAR LOWER BODY CLOTHING: TOTAL
DAILY ACTIVITIY SCORE: 6
HELP NEEDED FOR BATHING: TOTAL
EATING MEALS: TOTAL
WALKING IN HOSPITAL ROOM: TOTAL
PATIENT BASELINE BEDBOUND: NO
TOILETING: TOTAL
TURNING FROM BACK TO SIDE WHILE IN FLAT BAD: A LITTLE
MOVING FROM LYING ON BACK TO SITTING ON SIDE OF FLAT BED WITH BEDRAILS: A LITTLE
CLIMB 3 TO 5 STEPS WITH RAILING: TOTAL
PERSONAL GROOMING: TOTAL
MOBILITY SCORE: 11
DRESSING REGULAR UPPER BODY CLOTHING: TOTAL

## 2024-10-08 ASSESSMENT — LIFESTYLE VARIABLES
AUDIT-C TOTAL SCORE: -1
HOW OFTEN DO YOU HAVE 6 OR MORE DRINKS ON ONE OCCASION: PATIENT UNABLE TO ANSWER
HOW OFTEN DO YOU HAVE A DRINK CONTAINING ALCOHOL: PATIENT UNABLE TO ANSWER
AUDIT-C TOTAL SCORE: -1
SKIP TO QUESTIONS 9-10: 0
SKIP TO QUESTIONS 9-10: 0
HOW MANY STANDARD DRINKS CONTAINING ALCOHOL DO YOU HAVE ON A TYPICAL DAY: PATIENT DOES NOT DRINK
AUDIT-C TOTAL SCORE: -1

## 2024-10-08 ASSESSMENT — PAIN SCALES - PAIN ASSESSMENT IN ADVANCED DEMENTIA (PAINAD)
BODYLANGUAGE: TENSE, DISTRESSED PACING, FIDGETING
BREATHING: NORMAL
FACIALEXPRESSION: FACIAL GRIMACING

## 2024-10-08 ASSESSMENT — PAIN - FUNCTIONAL ASSESSMENT
PAIN_FUNCTIONAL_ASSESSMENT: 0-10

## 2024-10-08 ASSESSMENT — PAIN SCALES - GENERAL
PAINLEVEL_OUTOF10: 4
PAINLEVEL_OUTOF10: 6

## 2024-10-09 LAB
APPEARANCE UR: CLEAR
BACTERIA #/AREA URNS AUTO: ABNORMAL /HPF
BILIRUB UR STRIP.AUTO-MCNC: NEGATIVE MG/DL
COLOR UR: YELLOW
GLUCOSE BLD MANUAL STRIP-MCNC: 184 MG/DL (ref 74–99)
GLUCOSE BLD MANUAL STRIP-MCNC: 223 MG/DL (ref 74–99)
GLUCOSE BLD MANUAL STRIP-MCNC: 224 MG/DL (ref 74–99)
GLUCOSE BLD MANUAL STRIP-MCNC: 279 MG/DL (ref 74–99)
GLUCOSE UR STRIP.AUTO-MCNC: ABNORMAL MG/DL
HOLD SPECIMEN: NORMAL
HYALINE CASTS #/AREA URNS AUTO: ABNORMAL /LPF
KETONES UR STRIP.AUTO-MCNC: ABNORMAL MG/DL
LEUKOCYTE ESTERASE UR QL STRIP.AUTO: ABNORMAL
MUCOUS THREADS #/AREA URNS AUTO: ABNORMAL /LPF
NITRITE UR QL STRIP.AUTO: ABNORMAL
PH UR STRIP.AUTO: 5 [PH]
PROT UR STRIP.AUTO-MCNC: ABNORMAL MG/DL
RBC # UR STRIP.AUTO: NEGATIVE /UL
RBC #/AREA URNS AUTO: ABNORMAL /HPF
SP GR UR STRIP.AUTO: 1.03
SQUAMOUS #/AREA URNS AUTO: ABNORMAL /HPF
UROBILINOGEN UR STRIP.AUTO-MCNC: NORMAL MG/DL
WBC #/AREA URNS AUTO: ABNORMAL /HPF

## 2024-10-09 PROCEDURE — 2500000004 HC RX 250 GENERAL PHARMACY W/ HCPCS (ALT 636 FOR OP/ED)

## 2024-10-09 PROCEDURE — 99232 SBSQ HOSP IP/OBS MODERATE 35: CPT | Performed by: INTERNAL MEDICINE

## 2024-10-09 PROCEDURE — 97112 NEUROMUSCULAR REEDUCATION: CPT | Mod: GO

## 2024-10-09 PROCEDURE — 2500000001 HC RX 250 WO HCPCS SELF ADMINISTERED DRUGS (ALT 637 FOR MEDICARE OP): Performed by: INTERNAL MEDICINE

## 2024-10-09 PROCEDURE — 97530 THERAPEUTIC ACTIVITIES: CPT | Mod: GP

## 2024-10-09 PROCEDURE — 2500000002 HC RX 250 W HCPCS SELF ADMINISTERED DRUGS (ALT 637 FOR MEDICARE OP, ALT 636 FOR OP/ED): Mod: MUE

## 2024-10-09 PROCEDURE — 82947 ASSAY GLUCOSE BLOOD QUANT: CPT

## 2024-10-09 PROCEDURE — 2500000001 HC RX 250 WO HCPCS SELF ADMINISTERED DRUGS (ALT 637 FOR MEDICARE OP)

## 2024-10-09 PROCEDURE — 97162 PT EVAL MOD COMPLEX 30 MIN: CPT | Mod: GP

## 2024-10-09 PROCEDURE — 96372 THER/PROPH/DIAG INJ SC/IM: CPT

## 2024-10-09 PROCEDURE — 81001 URINALYSIS AUTO W/SCOPE: CPT

## 2024-10-09 PROCEDURE — 2500000002 HC RX 250 W HCPCS SELF ADMINISTERED DRUGS (ALT 637 FOR MEDICARE OP, ALT 636 FOR OP/ED): Performed by: INTERNAL MEDICINE

## 2024-10-09 PROCEDURE — 99221 1ST HOSP IP/OBS SF/LOW 40: CPT | Performed by: ORTHOPAEDIC SURGERY

## 2024-10-09 PROCEDURE — 23600 CLTX PROX HUMRL FX W/O MNPJ: CPT | Performed by: ORTHOPAEDIC SURGERY

## 2024-10-09 PROCEDURE — 87186 SC STD MICRODIL/AGAR DIL: CPT | Mod: STJLAB

## 2024-10-09 PROCEDURE — G0378 HOSPITAL OBSERVATION PER HR: HCPCS

## 2024-10-09 PROCEDURE — 97167 OT EVAL HIGH COMPLEX 60 MIN: CPT | Mod: GO

## 2024-10-09 RX ORDER — DONEPEZIL HYDROCHLORIDE 10 MG/1
10 TABLET, FILM COATED ORAL 2 TIMES DAILY
Status: DISCONTINUED | OUTPATIENT
Start: 2024-10-09 | End: 2024-10-11 | Stop reason: HOSPADM

## 2024-10-09 RX ORDER — ACETAMINOPHEN 325 MG/1
650 TABLET ORAL EVERY 8 HOURS PRN
Qty: 30 TABLET | Refills: 0 | Status: SHIPPED | OUTPATIENT
Start: 2024-10-09 | End: 2024-11-08

## 2024-10-09 RX ORDER — OXYCODONE HYDROCHLORIDE 5 MG/1
5 TABLET ORAL EVERY 4 HOURS PRN
Qty: 15 TABLET | Refills: 0 | Status: SHIPPED | OUTPATIENT
Start: 2024-10-09

## 2024-10-09 RX ORDER — INSULIN LISPRO 100 [IU]/ML
0-10 INJECTION, SOLUTION INTRAVENOUS; SUBCUTANEOUS
Status: DISCONTINUED | OUTPATIENT
Start: 2024-10-09 | End: 2024-10-11 | Stop reason: HOSPADM

## 2024-10-09 RX ORDER — QUETIAPINE FUMARATE 25 MG/1
25 TABLET, FILM COATED ORAL NIGHTLY
Qty: 30 TABLET | Refills: 0 | Status: SHIPPED | OUTPATIENT
Start: 2024-10-09 | End: 2024-11-08

## 2024-10-09 RX ORDER — QUETIAPINE FUMARATE 25 MG/1
12.5 TABLET, FILM COATED ORAL 2 TIMES DAILY
Status: DISCONTINUED | OUTPATIENT
Start: 2024-10-09 | End: 2024-10-11 | Stop reason: HOSPADM

## 2024-10-09 RX ORDER — POLYETHYLENE GLYCOL 3350 17 G/17G
17 POWDER, FOR SOLUTION ORAL DAILY
Qty: 30 PACKET | Refills: 0 | Status: SHIPPED | OUTPATIENT
Start: 2024-10-10 | End: 2024-11-09

## 2024-10-09 SDOH — SOCIAL STABILITY: SOCIAL INSECURITY: WITHIN THE LAST YEAR, HAVE YOU BEEN HUMILIATED OR EMOTIONALLY ABUSED IN OTHER WAYS BY YOUR PARTNER OR EX-PARTNER?: NO

## 2024-10-09 SDOH — ECONOMIC STABILITY: FOOD INSECURITY: HOW HARD IS IT FOR YOU TO PAY FOR THE VERY BASICS LIKE FOOD, HOUSING, MEDICAL CARE, AND HEATING?: NOT VERY HARD

## 2024-10-09 SDOH — ECONOMIC STABILITY: INCOME INSECURITY: IN THE LAST 12 MONTHS, WAS THERE A TIME WHEN YOU WERE NOT ABLE TO PAY THE MORTGAGE OR RENT ON TIME?: NO

## 2024-10-09 SDOH — ECONOMIC STABILITY: TRANSPORTATION INSECURITY
IN THE PAST 12 MONTHS, HAS THE LACK OF TRANSPORTATION KEPT YOU FROM MEDICAL APPOINTMENTS OR FROM GETTING MEDICATIONS?: NO

## 2024-10-09 SDOH — ECONOMIC STABILITY: HOUSING INSECURITY: IN THE LAST 12 MONTHS, WAS THERE A TIME WHEN YOU WERE NOT ABLE TO PAY THE MORTGAGE OR RENT ON TIME?: NO

## 2024-10-09 SDOH — SOCIAL STABILITY: SOCIAL INSECURITY: WITHIN THE LAST YEAR, HAVE YOU BEEN AFRAID OF YOUR PARTNER OR EX-PARTNER?: NO

## 2024-10-09 SDOH — ECONOMIC STABILITY: FOOD INSECURITY: WITHIN THE PAST 12 MONTHS, YOU WORRIED THAT YOUR FOOD WOULD RUN OUT BEFORE YOU GOT THE MONEY TO BUY MORE.: NEVER TRUE

## 2024-10-09 SDOH — ECONOMIC STABILITY: INCOME INSECURITY: HOW HARD IS IT FOR YOU TO PAY FOR THE VERY BASICS LIKE FOOD, HOUSING, MEDICAL CARE, AND HEATING?: NOT VERY HARD

## 2024-10-09 SDOH — ECONOMIC STABILITY: TRANSPORTATION INSECURITY: IN THE PAST 12 MONTHS, HAS LACK OF TRANSPORTATION KEPT YOU FROM MEDICAL APPOINTMENTS OR FROM GETTING MEDICATIONS?: NO

## 2024-10-09 SDOH — ECONOMIC STABILITY: FOOD INSECURITY: WITHIN THE PAST 12 MONTHS, YOU WORRIED THAT YOUR FOOD WOULD RUN OUT BEFORE YOU GOT MONEY TO BUY MORE.: NEVER TRUE

## 2024-10-09 SDOH — ECONOMIC STABILITY: TRANSPORTATION INSECURITY
IN THE PAST 12 MONTHS, HAS LACK OF TRANSPORTATION KEPT YOU FROM MEETINGS, WORK, OR FROM GETTING THINGS NEEDED FOR DAILY LIVING?: NO

## 2024-10-09 SDOH — SOCIAL STABILITY: SOCIAL INSECURITY
WITHIN THE LAST YEAR, HAVE YOU BEEN KICKED, HIT, SLAPPED, OR OTHERWISE PHYSICALLY HURT BY YOUR PARTNER OR EX-PARTNER?: NO

## 2024-10-09 SDOH — ECONOMIC STABILITY: INCOME INSECURITY: IN THE PAST 12 MONTHS, HAS THE ELECTRIC, GAS, OIL, OR WATER COMPANY THREATENED TO SHUT OFF SERVICE IN YOUR HOME?: NO

## 2024-10-09 SDOH — HEALTH STABILITY: MENTAL HEALTH
STRESS IS WHEN SOMEONE FEELS TENSE, NERVOUS, ANXIOUS, OR CAN'T SLEEP AT NIGHT BECAUSE THEIR MIND IS TROUBLED. HOW STRESSED ARE YOU?: NOT AT ALL

## 2024-10-09 SDOH — ECONOMIC STABILITY: INCOME INSECURITY: IN THE PAST 12 MONTHS HAS THE ELECTRIC, GAS, OIL, OR WATER COMPANY THREATENED TO SHUT OFF SERVICES IN YOUR HOME?: NO

## 2024-10-09 SDOH — HEALTH STABILITY: PHYSICAL HEALTH: ON AVERAGE, HOW MANY DAYS PER WEEK DO YOU ENGAGE IN MODERATE TO STRENUOUS EXERCISE (LIKE A BRISK WALK)?: 0 DAYS

## 2024-10-09 SDOH — ECONOMIC STABILITY: HOUSING INSECURITY: IN THE PAST 12 MONTHS, HOW MANY TIMES HAVE YOU MOVED WHERE YOU WERE LIVING?: 1

## 2024-10-09 SDOH — ECONOMIC STABILITY: FOOD INSECURITY: WITHIN THE PAST 12 MONTHS, THE FOOD YOU BOUGHT JUST DIDN'T LAST AND YOU DIDN'T HAVE MONEY TO GET MORE.: NEVER TRUE

## 2024-10-09 SDOH — SOCIAL STABILITY: SOCIAL INSECURITY
WITHIN THE LAST YEAR, HAVE TO BEEN RAPED OR FORCED TO HAVE ANY KIND OF SEXUAL ACTIVITY BY YOUR PARTNER OR EX-PARTNER?: NO

## 2024-10-09 SDOH — ECONOMIC STABILITY: HOUSING INSECURITY: AT ANY TIME IN THE PAST 12 MONTHS, WERE YOU HOMELESS OR LIVING IN A SHELTER (INCLUDING NOW)?: NO

## 2024-10-09 SDOH — HEALTH STABILITY: PHYSICAL HEALTH: ON AVERAGE, HOW MANY MINUTES DO YOU ENGAGE IN EXERCISE AT THIS LEVEL?: 0 MIN

## 2024-10-09 SDOH — SOCIAL STABILITY: SOCIAL INSECURITY
WITHIN THE LAST YEAR, HAVE YOU BEEN RAPED OR FORCED TO HAVE ANY KIND OF SEXUAL ACTIVITY BY YOUR PARTNER OR EX-PARTNER?: NO

## 2024-10-09 SDOH — HEALTH STABILITY: MENTAL HEALTH
DO YOU FEEL STRESS - TENSE, RESTLESS, NERVOUS, OR ANXIOUS, OR UNABLE TO SLEEP AT NIGHT BECAUSE YOUR MIND IS TROUBLED ALL THE TIME - THESE DAYS?: NOT AT ALL

## 2024-10-09 ASSESSMENT — COGNITIVE AND FUNCTIONAL STATUS - GENERAL
STANDING UP FROM CHAIR USING ARMS: TOTAL
DRESSING REGULAR UPPER BODY CLOTHING: A LITTLE
MOBILITY SCORE: 11
HELP NEEDED FOR BATHING: TOTAL
TOILETING: TOTAL
DAILY ACTIVITIY SCORE: 12
DRESSING REGULAR UPPER BODY CLOTHING: TOTAL
PERSONAL GROOMING: TOTAL
CLIMB 3 TO 5 STEPS WITH RAILING: TOTAL
DRESSING REGULAR LOWER BODY CLOTHING: A LITTLE
WALKING IN HOSPITAL ROOM: TOTAL
DRESSING REGULAR LOWER BODY CLOTHING: TOTAL
MOVING FROM LYING ON BACK TO SITTING ON SIDE OF FLAT BED WITH BEDRAILS: TOTAL
CLIMB 3 TO 5 STEPS WITH RAILING: TOTAL
TURNING FROM BACK TO SIDE WHILE IN FLAT BAD: TOTAL
MOVING TO AND FROM BED TO CHAIR: A LOT
WALKING IN HOSPITAL ROOM: TOTAL
DAILY ACTIVITIY SCORE: 6
HELP NEEDED FOR BATHING: TOTAL
PERSONAL GROOMING: A LOT
TURNING FROM BACK TO SIDE WHILE IN FLAT BAD: A LITTLE
TOILETING: TOTAL
STANDING UP FROM CHAIR USING ARMS: TOTAL
MOVING FROM LYING ON BACK TO SITTING ON SIDE OF FLAT BED WITH BEDRAILS: A LITTLE
EATING MEALS: TOTAL
EATING MEALS: A LOT
MOBILITY SCORE: 6
MOVING TO AND FROM BED TO CHAIR: TOTAL

## 2024-10-09 ASSESSMENT — LIFESTYLE VARIABLES
SKIP TO QUESTIONS 9-10: 1
HOW OFTEN DO YOU HAVE SIX OR MORE DRINKS ON ONE OCCASION: NEVER
AUDIT-C TOTAL SCORE: 0
HOW MANY STANDARD DRINKS CONTAINING ALCOHOL DO YOU HAVE ON A TYPICAL DAY: PATIENT DOES NOT DRINK
HOW OFTEN DO YOU HAVE A DRINK CONTAINING ALCOHOL: NEVER

## 2024-10-09 ASSESSMENT — ACTIVITIES OF DAILY LIVING (ADL)
TOILETING: DEPENDENT
GROOMING: NEEDS ASSISTANCE
BATHING_ASSISTANCE: TOTAL
ADEQUATE_TO_COMPLETE_ADL: YES
BATHING: DEPENDENT
JUDGMENT_ADEQUATE_SAFELY_COMPLETE_DAILY_ACTIVITIES: NO
HEARING - LEFT EAR: FUNCTIONAL
FEEDING YOURSELF: NEEDS ASSISTANCE
PATIENT'S MEMORY ADEQUATE TO SAFELY COMPLETE DAILY ACTIVITIES?: NO
LACK_OF_TRANSPORTATION: NO
DRESSING YOURSELF: NEEDS ASSISTANCE
LACK_OF_TRANSPORTATION: NO
WALKS IN HOME: NEEDS ASSISTANCE
HEARING - RIGHT EAR: FUNCTIONAL

## 2024-10-09 ASSESSMENT — PAIN SCALES - GENERAL
PAINLEVEL_OUTOF10: 0 - NO PAIN

## 2024-10-09 ASSESSMENT — PAIN - FUNCTIONAL ASSESSMENT
PAIN_FUNCTIONAL_ASSESSMENT: 0-10

## 2024-10-09 ASSESSMENT — PAIN SCALES - WONG BAKER: WONGBAKER_NUMERICALRESPONSE: NO HURT

## 2024-10-09 NOTE — PROGRESS NOTES
Raissa Puentes is a 74 y.o. female on day 0 of admission presenting with Fall in shower.      Subjective   Patient reports no pain.  Right upper extremity in a sling.  Does not give much information       Objective     Last Recorded Vitals  /59 (BP Location: Left arm, Patient Position: Lying)   Pulse 102   Temp 37.6 °C (99.7 °F) (Temporal)   Resp 18   Wt 64.7 kg (142 lb 10.2 oz)   SpO2 90%   Intake/Output last 3 Shifts:    Intake/Output Summary (Last 24 hours) at 10/9/2024 1041  Last data filed at 10/9/2024 0600  Gross per 24 hour   Intake 50 ml   Output 300 ml   Net -250 ml       Admission Weight  Weight: 63.5 kg (140 lb) (10/08/24 1148)    Daily Weight  10/08/24 : 64.7 kg (142 lb 10.2 oz)      Physical Exam:  General: Alert, not in acute distress, on room air   HEENT: PERRLA, head intact and normocephalic  Neck: Normal to inspection  Lungs: Clear to auscultation, work of breathing within normal limit  Cardiac: Regular rate and rhythm  Abdomen: Soft nontender, positive bowel sounds  : Exam deferred  Skin: Intact  Hematology: No petechia or excessive ecchymosis  Musculoskeletal: right upper extremity in sling  Neurological: Alert awake orientedx1, no focal deficit, cranial nerves grossly intact  Psych: Does not provide much information    Relevant Results  Scheduled medications  buPROPion SR, 150 mg, oral, Daily  citalopram, 10 mg, oral, Daily  enoxaparin, 40 mg, subcutaneous, q24h  losartan, 25 mg, oral, Daily  memantine, 10 mg, oral, Daily  polyethylene glycol, 17 g, oral, Daily  QUEtiapine, 25 mg, oral, Nightly      Continuous medications     PRN medications  PRN medications: acetaminophen, oxyCODONE, oxyCODONE   Labs  Results from last 7 days   Lab Units 10/08/24  1938   WBC AUTO x10*3/uL 12.4*   HEMOGLOBIN g/dL 11.4*   HEMATOCRIT % 36.4   PLATELETS AUTO x10*3/uL 201     Results from last 7 days   Lab Units 10/08/24  1938   SODIUM mmol/L 142   POTASSIUM mmol/L 3.7   CHLORIDE mmol/L 106   CO2  mmol/L 29   BUN mg/dL 15   CREATININE mg/dL 0.58   CALCIUM mg/dL 9.9   PROTEIN TOTAL g/dL 6.6   BILIRUBIN TOTAL mg/dL 0.8   ALK PHOS U/L 84   ALT U/L 23   AST U/L 22   GLUCOSE mg/dL 201*       XR knee left 4+ views    Result Date: 10/8/2024  Interpreted By:  Waylon Nava, STUDY: XR KNEE LEFT 4+ VIEWS; ;  10/8/2024 4:43 pm   INDICATION: Signs/Symptoms:fall.     COMPARISON: 07/14/2014   ACCESSION NUMBER(S): [id number]   ORDERING CLINICIAN: CHAY PEREZ   FINDINGS: Left knee, four views   There is no fracture. There is no dislocation. There is mild ligamental osteophytosis. There is diffuse osteopenia. No effusion seen. There is no lytic or sclerotic lesion. There is no soft tissue abnormality seen.       Limited by diffuse osteopenia. Within this limitation no definite fracture seen. There is no effusion. If there is persistent concern CT can be performed for further evaluation     MACRO: None   Signed by: Waylon Nava 10/8/2024 5:08 PM Dictation workstation:   [id number]    XR ankle left 3+ views    Result Date: 10/8/2024  Interpreted By:  Waylon Nava, STUDY: XR ANKLE LEFT 3+ VIEWS; ;  10/8/2024 4:43 pm   INDICATION: Signs/Symptoms:fall.     COMPARISON: None.   ACCESSION NUMBER(S): [id number]   ORDERING CLINICIAN: CHAY PEREZ   FINDINGS: Left ankle, three views   There is no fracture. There is no dislocation. There are no degenerative changes. There is no lytic or sclerotic lesion. There is no soft tissue abnormality seen.       No acute abnormality in the left ankle     MACRO: None   Signed by: Waylon Nava 10/8/2024 5:07 PM Dictation workstation:   [id number]    CT lumbar spine wo IV contrast    Result Date: 10/8/2024  Interpreted By:  Shefali Hi, STUDY: CT LUMBAR SPINE WO IV CONTRAST  10/8/2024 3:13 pm   INDICATION: Signs/Symptoms:fall   COMPARISON: CT lumbar spine 12/22/2022.   ACCESSION NUMBER(S): [id number]   ORDERING CLINICIAN: KAUSHAL KAYE   TECHNIQUE: Axial CT images  of the lumbar spine are obtained. Axial, coronal and sagittal reconstructions are provided for review.   FINDINGS: Segmentation: Normal.   Hardware: None. Fracture: No acute fracture. Remote L3 superior endplate compression fracture deformity, not significantly changed compared to 12/22/2022. Vertebral Body Heights: Grossly maintained with the exception of L3. Alignment:  No traumatic malalignment. Unchanged grade 1 anterolisthesis L3 over L4 by approximately 9 mm. No new pars defects. Minimal levocurvature.   Intervertebral Disc Spaces: Grossly unchanged multilevel disc space narrowing, severe at L5-S1.   Degenerative change:   T12-L1:  There is no significant spinal canal stenosis or neural foraminal narrowing.   L1-2:  There is no significant spinal canal or neural foraminal narrowing.   L2-3:  There is no significant spinal canal or neural foraminal narrowing.   L3-4:  There is moderate spinal canal and bilateral moderate-to-severe neural foraminal narrowing.   L4-5:  There is mild significant spinal canal without significant neural foraminal narrowing.   L5-S1:  There is no significant spinal canal. There is severe left-sided neural foraminal narrowing.   Prevertebral/Paraspinal Soft Tissues: No acute abnormality.       No acute fracture or traumatic malalignment in the lumbar spine.   Unchanged L3 compression fracture deformity dating back to 12/22/2022.   Signed by: Shefali Hi 10/8/2024 3:46 PM Dictation workstation:   [id number]    CT abdomen pelvis w IV contrast    Result Date: 10/8/2024  Interpreted By:  Shefali Hi, STUDY: CT ABDOMEN PELVIS W IV CONTRAST;  10/8/2024 3:13 pm   INDICATION: Signs/Symptoms:fall.   COMPARISON: CT chest, abdomen and pelvis 12/22/2022   ACCESSION NUMBER(S): [id number]   ORDERING CLINICIAN: KAUSHAL KAYE   TECHNIQUE: Axial CT of the abdomen and pelvis was performed following intravenous administration of 75 ml of contrast Omnipaque 350 with coronal and sagittal  reconstruction.   FINDINGS: Lower chest: Bilateral dependent atelectasis.   Liver: No mass.   Biliary: No intrahepatic or extrahepatic bile duct dilation. Mildly distended gallbladder without gallbladder wall thickening or pericholecystic inflammation. Focal nodularity at the gallbladder fundus is redemonstrated likely adenomyomatosis or small polyp.   Spleen: No mass. No splenomegaly.   Pancreas: No mass or duct dilation.   Adrenals: Stable 2.8 cm left adrenal myelolipoma versus adenoma. Stable 2.4 cm right adrenal adenoma.   Kidneys: A few small left parapelvic cysts. No calculus or hydronephrosis.   GI tract: No bowel wall thickening or dilation. Normal appendix.   Lymph nodes: No lymphadenopathy.   Mesentery/peritoneum: No free fluid, free air or fluid collection.   Vasculature: Mild-to-moderate abdominal aortic atherosclerotic calcifications without aneurysmal dilation.   Pelvis: No free fluid, free air or fluid collection. Moderately distended bladder. Hysterectomy.   Bones/Soft tissues: Multiple remote healed bilateral anterior rib fractures.No acute fracture in the sacrum, pelvis or imaged proximal femora. Please refer to separately reported lumbar spine CT for additional findings.       No acute abdominopelvic process.   Please refer to separately reported lumbar spine CT for additional findings.   MACRO: None   Signed by: Shefali Hi 10/8/2024 3:39 PM Dictation workstation:   [id number]    CT cervical spine wo IV contrast    Result Date: 10/8/2024  Interpreted By:  Shefali Hi, STUDY: CT CERVICAL SPINE WO IV CONTRAST;  10/8/2024 3:13 pm   INDICATION: Signs/Symptoms:fall.   COMPARISON: CT C-spine 12/22/2022.   ACCESSION NUMBER(S): [id number]   ORDERING CLINICIAN: KAUSHAL KAYE   TECHNIQUE: Thin section axial images were obtained from the skull base down through the thoracic inlet. Sagittal and coronal reconstruction images were generated. Soft tissue, lung, and bone windows were reviewed.    FINDINGS: Prevertebral/Paraspinal Soft Tissues: No acute abnormalities.   CERVICAL SPINE: Hardware: None. Fracture: No acute fracture. Vertebral Body Heights: Maintained. Alignment: No traumatic listhesis. Grossly unchanged mild reversal of cervical lordosis centered at C5 resulting from grade 1 anterolisthesis C4 over C5 and degenerative retrolisthesis C5 over C6. Spinal canal and neural foramina: Overall progressed multilevel degenerative disc disease, most pronounced with severe neural foraminal narrowing at C4-C5 on the right, C5-C6 bilaterally, C6-C7 bilaterally and moderate to severe canal stenosis at C5-C6.       No acute fracture or traumatic malalignment.   MACRO None   Signed by: Shefali Hi 10/8/2024 3:30 PM Dictation workstation:   [id number]    CT head wo IV contrast    Result Date: 10/8/2024  Interpreted By:  Shefali Hi, STUDY: CT HEAD WO IV CONTRAST;  10/8/2024 3:13 pm   INDICATION: Signs/Symptoms:fall.   COMPARISON: CT head 12/22/2022.   ACCESSION NUMBER(S): [id number]   ORDERING CLINICIAN: KAUSHAL KAYE   TECHNIQUE: Noncontrast axial CT scan of head was performed.   FINDINGS: Parenchyma: No intracranial hemorrhage. The grey-white differentiation is intact. No mass effect or midline shift. Scattered periventricular white matter hypodensities, likely chronic microvascular ischemic change. Moderate parenchymal atrophy.   CSF Spaces: The ventricles, sulci and basal cisterns are proportional to degree of parenchymal volume loss.   Extra-Axial Fluid: None.   Calvarium: No acute depressed fracture.   Paranasal sinuses: Bilateral maxillary antrostomies. Bilateral mild maxillary mucosal thickening. Partial opacification left sphenoid sinus with an air-fluid level. Minimal opacification of ethmoid air cells.   Mastoids: Clear.   Orbits: No acute abnormality.   Soft tissues: No acute abnormality.       No acute intracranial hemorrhage or depressed calvarial fracture.   MACRO None   Signed by:  Shefali Hi 10/8/2024 3:26 PM Dictation workstation:   [id number]    XR pelvis 1-2 views    Result Date: 10/8/2024  Interpreted By:  Evan Oliver, STUDY: XR PELVIS 1-2 VIEWS 10/8/2024 1:50 pm   INDICATION: Signs/Symptoms:fall injury   COMPARISON: 08/13/2020   ACCESSION NUMBER(S): [id number]   ORDERING CLINICIAN: CHAY PEREZ   TECHNIQUE: A single AP view of the pelvis was obtained.   FINDINGS: There is no evidence of acute fracture or dislocation identified. Mild-to-moderate hypertrophic degenerative changes are seen in the sacroiliac joints bilaterally. Minimal degenerative changes are seen in the hips. Rounded calcifications are seen with throughout the pelvis, most consistent with phleboliths.       1. No fracture or dislocation. 2. Degenerative changes, as described above.   MACRO: None.     Signed by: Evan Oliver 10/8/2024 2:07 PM Dictation workstation:   [id number]    XR shoulder right 2+ views    Result Date: 10/8/2024  Interpreted By:  Gold Mukherjee, STUDY: Right shoulder dated  10/8/2024.   INDICATION: Signs/Symptoms:fall   COMPARISON: None.   ACCESSION NUMBER(S): [id number]   ORDERING CLINICIAN: KAUSHAL KAYE   TECHNIQUE: Two views of the right shoulder.   FINDINGS: Bones are demineralized. There is comminuted impacted displaced fracturing of the proximal humerus with dominant transverse fracture line through the surgical neck with comminuted extension into at least the greater tubercle of the humerus. The metadiaphysis is displaced centrally by a proximally 1.8 cm and anteriorly by possibly up to 1.4 cm. There is mild glenohumeral and acromioclavicular joint degenerative change. Degenerative changes seen of the spine. Reticular edema is seen in the soft tissues of the arm likely from contusion.       Comminuted impacted displaced fracturing of the proximal humerus.   MACRO: None   Signed by: Gold Mukherjee 10/8/2024 1:28 PM Dictation workstation:   [id number]    XR wrist right 3+  views    Result Date: 10/8/2024  Interpreted By:  Gold Mukherjee, STUDY: Right wrist dated  10/8/2024   INDICATION: Signs/Symptoms:fall   COMPARISON: Radiographs dated 12/22/2022   ACCESSION NUMBER(S): [id number]   ORDERING CLINICIAN: KAUSHAL KAYE   TECHNIQUE: Four views of the right wrist.   FINDINGS: Bones are demineralized. No fracture or dislocation is evident. There is moderate to severe 1st digit carpometacarpal joint degenerative change. Mild degenerative changes seen of the triscaphe joint. Calcium pyrophosphate dihydrate deposition is seen in the region of the lunotriquetral joint. No soft tissue gas or radiopaque foreign body is evident.       No osseous injury is evident in this patient with demineralized bones.   MACRO: None   Signed by: Gold Mukherjee 10/8/2024 1:27 PM Dictation workstation:   [id number]          Assessment/Plan   Raissa Puentes is a 74 y.o. female on day 0 of admission presenting with Fall in shower.  Principal Problem:    Fall in shower      74-year-old female with past medical history of Alzheimer's dementia, non-insulin-dependent diabetes mellitus, hyperlipidemia, hypertension presented after mechanical fall injuring the right proximal humerus fracture.    Mechanical fall  Right proximal humerus fracture  Nonoperative management as per orthopedic surgery  PT OT  Multimodality pain control  I called patient's family member Kenan at [phone] to discuss and left a voicemail to call me back    Non-insulin-dependent diabetes mellitus  Accu-Chek with sliding scale insulin    Abnormal urinalysis  No signs and symptoms of UTI  Patient has been treated for UTI twice in the past 1 month  Will follow-up urine culture result and add antibiotics if needed    Hypertension  Continue with losartan    Alzheimer's dementia  Continue with memantine, Seroquel, Celexa, Wellbutrin SR    DVT prophylaxis  Lovenox     Plan discussed with patient at bedside    Moderate level of MDM based on above  issue and discussing plan    This note is created using voice recognition software. All efforts are made to minimize errors, if there are errors there due to transcription.    Alberto Piper  [facility]

## 2024-10-09 NOTE — ED PROVIDER NOTES
HPI   Chief Complaint   Patient presents with    Fall       Patient is a 74-year-old female presenting [facility] ED for fall that occurred yesterday.  Per friend, was in shower with home health care giver and slipped and fell in the shower yesterday.  They report no loss of conscious.  Patient was having significant pain throughout yesterday, however EMS was not called.  Friend in room with patient, reported that she visited patient this morning found her in bed.  Patient was having significant pain preventing her from even getting up.  Patient has baseline dementia.  Patient mentating at her baseline.  Review of systems limited due to patient mental status.                Patient History   Past Medical History:   Diagnosis Date    Acute candidiasis of vulva and vagina 08/02/2019    Yeast infection of the vagina    Acute recurrent maxillary sinusitis 09/20/2019    Acute recurrent maxillary sinusitis    Candidiasis, unspecified 03/26/2020    Yeast infection    Candidiasis, unspecified 08/02/2019    Yeast infection    Disorder of the skin and subcutaneous tissue, unspecified 08/06/2020    Skin lesions    Encounter for general adult medical examination without abnormal findings 10/01/2020    Encounter for Medicare annual wellness exam    Encounter for general adult medical examination without abnormal findings 10/08/2021    Encounter for Medicare annual wellness exam    Encounter for immunization 10/08/2021    Encounter for immunization    Encounter for other screening for malignant neoplasm of breast 08/06/2021    Screening for breast cancer    Encounter for other screening for malignant neoplasm of breast     Breast cancer screening    Encounter for screening for malignant neoplasm of colon 06/24/2019    Colon cancer screening    Encounter for screening for malignant neoplasm of colon     Encounter for colorectal cancer screening    Encounter for screening for osteoporosis 10/01/2020    Osteoporosis screening     Headache, unspecified 09/20/2019    Temporal headache    Other conditions influencing health status 03/26/2020    History of cough    Other disturbances of skin sensation 08/06/2020    Burning sensation    Other specified disorders of the skin and subcutaneous tissue 06/24/2019    Allergic dermatosis    Pain in unspecified ankle and joints of unspecified foot     Pain, ankle    Pain in unspecified hip 03/25/2020    Hip pain    Personal history of other diseases of the nervous system and sense organs     History of ear pain    Personal history of other diseases of the nervous system and sense organs 04/14/2021    History of ear pain    Personal history of other diseases of the respiratory system 12/17/2021    History of sinusitis    Personal history of other diseases of the respiratory system 04/14/2021    History of paranasal sinus congestion    Personal history of other diseases of the respiratory system 12/17/2020    History of acute sinusitis    Personal history of other diseases of the respiratory system     History of acute bronchitis    Personal history of other drug therapy 10/06/2020    History of influenza vaccination    Personal history of other drug therapy 01/02/2020    History of pneumococcal vaccination    Personal history of other drug therapy 10/28/2019    History of influenza vaccination    Personal history of other infectious and parasitic diseases 05/22/2019    History of herpes labialis    Personal history of other malignant neoplasm of skin 05/24/2022    History of other malignant neoplasm of skin    Personal history of other medical treatment 08/23/2022    History of screening mammography    Personal history of other mental and behavioral disorders     History of depression    Personal history of other mental and behavioral disorders 05/30/2022    History of depression    Personal history of urinary (tract) infections 06/18/2021    History of urinary tract infection     Past Surgical History:    Procedure Laterality Date    OTHER SURGICAL HISTORY  04/25/2019    Nasal septoplasty    OTHER SURGICAL HISTORY  04/25/2019    Hysterectomy     No family history on file.  Social History     Tobacco Use    Smoking status: Never    Smokeless tobacco: Never   Substance Use Topics    Alcohol use: Not Currently    Drug use: Never       Physical Exam   ED Triage Vitals [10/08/24 1148]   Temperature Heart Rate Respirations BP   37.1 °C (98.8 °F) 89 18 143/69      Pulse Ox Temp src Heart Rate Source Patient Position   96 % -- -- --      BP Location FiO2 (%)     -- --       Physical Exam  Constitutional:       Appearance: Normal appearance. She is normal weight.   HENT:      Head: Normocephalic and atraumatic.      Nose: Nose normal.      Mouth/Throat:      Mouth: Mucous membranes are moist.      Pharynx: Oropharynx is clear.   Eyes:      Extraocular Movements: Extraocular movements intact.      Conjunctiva/sclera: Conjunctivae normal.      Pupils: Pupils are equal, round, and reactive to light.   Cardiovascular:      Rate and Rhythm: Normal rate and regular rhythm.      Pulses: Normal pulses.      Heart sounds: Normal heart sounds.   Pulmonary:      Effort: Pulmonary effort is normal.      Breath sounds: Normal breath sounds.   Abdominal:      General: Abdomen is flat. Bowel sounds are normal.      Palpations: Abdomen is soft.   Musculoskeletal:         General: Swelling and tenderness present. Normal range of motion.      Cervical back: Normal range of motion and neck supple.      Comments: Extensive bruising over the right shoulder.  Pain on internal and external rotation of bilateral lower extremities.   Skin:     General: Skin is warm and dry.      Capillary Refill: Capillary refill takes less than 2 seconds.   Neurological:      General: No focal deficit present.      Mental Status: She is alert and oriented to person, place, and time. Mental status is at baseline.   Psychiatric:         Mood and Affect: Mood normal.          Behavior: Behavior normal.           ED Course & MDM   ED Course as of 10/08/24 2021   Tue Oct 08, 2024   1246 Patient is a 74-year-old female presenting [facility] ED for fall that occurred yesterday.  Per friend, was in shower with home health care giver and slipped and fell in the shower yesterday.  They report no loss of conscious.  Patient was having significant pain throughout yesterday, however EMS was not called.  Friend in room with patient, reported that she visited patient this morning found her in bed.  Patient was having significant pain preventing her from even getting up.  Patient has baseline dementia.  Patient mentating at her baseline.  Review of systems limited due to patient mental status. [MI]   1248 Physical exam significant for right shoulder pain, right elbow pain, right wrist pain and tenderness.  Significant bruising over the right shoulder.  Patient also having externally rotated right lower extremity and internally rotated left lower extremity.  Patient having hip pain on rotation of both lower extremities.  No abdominal tenderness noted. [MI]   1702 Consulted on-call orthopedics, Dr. Barnes for right humeral fracture.  He recommended placing patient in sling and swath and following up outpatient. [MI]      ED Course User Index  [MI] Isreal Gandara MD                 No data recorded     Olga Coma Scale Score: 14 (10/08/24 1254 : Manuela Beltrán RN)                           Medical Decision Making  Patient is a 74 y.o. female who presents to [facility] ED for Fall. On initial ED evaluation, patient found to be in no acute distress. Per HPI, concern to evaluate and treat for head injury, neck injury, right shoulder injury, right upper extremity injury, hip injury.  Obtaining appropriate CT and x-ray imaging.  CT head was negative for any acute intracranial abnormality.  CT C-spine negative for any acute fracture or subluxation of cervical spine.  X-ray imaging was largely  negative.  X-ray imaging of the right shoulder did show proximal right humeral fracture.  On-call orthopedics was consulted, advised patient may sling and swath.  They were okay with outpatient orthopedic follow-up.  Patient was attempted to be ambulated, however could not due to pain.  With patient right shoulder in sling and swath, and had significant pain secondary to injury, patient cannot safely perform activities of daily living.  Patient uses walker at baseline.  PT/OT were not able to see patient at this time in the ED.  Patient admitted to general medicine service for further evaluation management as well as SNF placement.  Medically stable at this time.  Caregiver and patient were made aware of diagnostic findings and treatment.  They were amenable to plan.          Procedure  Procedures     Isreal Gandara MD  Resident  10/08/24 2025

## 2024-10-09 NOTE — DISCHARGE SUMMARY
Discharge Diagnosis  Fall in shower    Issues Requiring Follow-Up  Follow-up with primary care provider and with orthopedic surgeon in 2 weeks    Discharge Meds     Medication List      START taking these medications     acetaminophen 325 mg tablet; Commonly known as: Tylenol; Take 2 tablets   (650 mg) by mouth every 8 hours if needed for mild pain (1 - 3), headaches   or fever (temp greater than 38.0 C) (mild pain (1-3) or headache).   oxyCODONE 5 mg immediate release tablet; Commonly known as: Roxicodone;   Take 1 tablet (5 mg) by mouth every 4 hours if needed for severe pain (7 -   10) or moderate pain (4 - 6).   polyethylene glycol 17 gram packet; Commonly known as: Glycolax,   Miralax; Take 17 g by mouth once daily.; Start taking on: October 10, 2024     CHANGE how you take these medications     buPROPion  mg 12 hr tablet; Commonly known as: Wellbutrin SR; Take   1 tablet (150 mg) by mouth 2 times a day.; What changed: when to take this   memantine 10 mg tablet; Commonly known as: Namenda; Take 1 tablet (10   mg) by mouth 2 times a day.; What changed: when to take this     CONTINUE taking these medications     ampicillin 500 mg capsule; Commonly known as: Principen; Take 500 mg 3   times daily once per week   Blood glucose monitoring meter kit kit; TEST 2-3 TIMES DAILY   calcium carbonate-vitamin D3 500 mg-5 mcg (200 unit) tablet   celecoxib 100 mg capsule; Commonly known as: CeleBREX   citalopram 20 mg tablet; Commonly known as: CeleXA; Take 1 tablet (20   mg) by mouth once daily.   donepezil 10 mg tablet; Commonly known as: Aricept; Take 1 tablet (10   mg) by mouth 2 times a day.   fluticasone 50 mcg/actuation nasal spray; Commonly known as: Flonase   FreeStyle Test strip; Generic drug: blood sugar diagnostic; TEST 2-3   TIMES DAILY   glipiZIDE XL 10 mg 24 hr tablet; Commonly known as: Glucotrol XL   lancets misc; TEST 2-3 TIMES DAILY   losartan 25 mg tablet; Commonly known as: Cozaar; Take 1 tablet (25  mg)   by mouth once daily.   Ozempic 0.25 mg or 0.5 mg (2 mg/3 mL) pen injector; Generic drug:   semaglutide; Inject 0.25 mg under the skin every 7 days.   pioglitazone 45 mg tablet; Commonly known as: Actos; Take 1 tablet (45   mg) by mouth once daily.   * QUEtiapine 25 mg tablet; Commonly known as: SEROquel; Take 1 tablet   (25 mg) by mouth 2 times a day. 12.5 mg po bid and 25 mg po at bedtime   * QUEtiapine 25 mg tablet; Commonly known as: SEROquel; Take 1 tablet   (25 mg) by mouth once daily at bedtime.   simvastatin 40 mg tablet; Commonly known as: Zocor; Take 1 tablet (40   mg) by mouth once daily.  * This list has 2 medication(s) that are the same as other medications   prescribed for you. Read the directions carefully, and ask your doctor or   other care provider to review them with you.       Test Results Pending At Discharge  Pending Labs       Order Current Status    Extra Urine Gray Tube In process    Urinalysis with Reflex Culture and Microscopic In process    Urine Culture In process            Hospital Course  74-year-old female with past medical history of Alzheimer's dementia, non-insulin-dependent diabetes mellitus, hyperlipidemia, hypertension presented after mechanical fall injuring the right proximal humerus fracture.  Patient was admitted and had a sling placed.  Orthopedic surgery was consulted and the recommended nonoperative management by way of nonweightbearing and sling immobilization for comfort.  Patient was also complaining about right leg and knee pain.  Patient had imaging done showing fracture of tibial plateau of indeterminate age.  CT scan was performed showing acute fracture which would explain why she is unable to stand.  Patient will be nonweightbearing on right lower extremity with a knee immobilizer.  She will need very close 2-week follow-up with Dr. Davin Garcia.   unfortunately given patient's current state she cannot go home.  And will be discharged to rehab.  She did not  meet inpatient criteria on admission and we explained to family multiple times regarding the observation admission and tried to answer their questions as best as possible.      34 minutes spent in discharge timing    Pertinent Physical Exam At Time of Discharge  General: Alert, not in acute distress, on room air   HEENT: PERRLA, head intact and normocephalic  Neck: Normal to inspection  Lungs: Clear to auscultation, work of breathing within normal limit  Cardiac: Regular rate and rhythm  Abdomen: Soft nontender, positive bowel sounds  : Exam deferred  Skin: Intact  Hematology: No petechia or excessive ecchymosis  Musculoskeletal: right upper extremity in sling.  Pain at right knee area.  Neurological: Alert awake oriented x 0-1, no focal deficit, cranial nerves grossly intact  Psych: Does not provide much information    Outpatient Follow-Up  Future Appointments   Date Time Provider Department Center   10/28/2024  1:00 PM DOLLY Bello-CNP [id number]   11/6/2024  2:15 PM Elier Alegria DO [id number] [place]   11/15/2024  9:00 AM Jennifer PATEL MD [id number]         Alberto Piper MD

## 2024-10-09 NOTE — CARE PLAN
Problem: Fall/Injury  Goal: Verbalize understanding of personal risk factors for fall in the hospital  Outcome: Not Progressing   The patient's goals for the shift include no falls/ able to rest    The clinical goals for the shift include no falls    Problem: Fall/Injury  Goal: Verbalize understanding of personal risk factors for fall in the hospital  Outcome: Not Progressing

## 2024-10-09 NOTE — CARE PLAN
The patient's goals for the shift include no falls/ able to rest    The clinical goals for the shift include no falls    Problem: Skin  Goal: Participates in plan/prevention/treatment measures  Outcome: Progressing  Goal: Prevent/manage excess moisture  Outcome: Progressing  Goal: Prevent/minimize sheer/friction injuries  Outcome: Progressing  Goal: Promote/optimize nutrition  Outcome: Progressing

## 2024-10-09 NOTE — CARE PLAN
The patient's goals for the shift include no falls/ able to rest    The clinical goals for the shift include no falls    Problem: Skin  Goal: Participates in plan/prevention/treatment measures  10/9/2024 0513 by Nakia Horner RN  Outcome: Progressing  10/8/2024 2249 by Nakia Horner RN  Outcome: Progressing  Goal: Prevent/manage excess moisture  10/9/2024 0513 by Nakia Horner RN  Outcome: Progressing  Flowsheets (Taken 10/9/2024 0513)  Prevent/manage excess moisture:   Cleanse incontinence/protect with barrier cream   Moisturize dry skin   Monitor for/manage infection if present  10/8/2024 2249 by Nakia Horner RN  Outcome: Progressing  Goal: Prevent/minimize sheer/friction injuries  Outcome: Progressing  Goal: Promote/optimize nutrition  Outcome: Progressing

## 2024-10-09 NOTE — H&P
History Of Present Illness  74 YOF PMH alzheimer's (A&Ox0), NIDDM2, HLD, HTN  presenting to [facility] ED s/p fall.     Due to baseline mentation of A&Ox0 could not obtain HPI, attempted to call  but he did not pickup. All info comes from ED signout and chart review.  Patient slipped and fell in the shower yesterday.  Due to continued pain was brought to the ER.  She normal can get up on her own, but was unable to do that due to pain.    Vitals WNL. CT C spine, L spine negative. CT head negative.  CT abd/pelvis negative but did show distended bladder. R shoulder xray: Comminuted impacted displaced fracturing of the proximal humerus. Case was discussed with on-call ortho who recommended non-operative management.    Past Medical History  She has a past medical history of Acute candidiasis of vulva and vagina (08/02/2019), Acute recurrent maxillary sinusitis (09/20/2019), Candidiasis, unspecified (03/26/2020), Candidiasis, unspecified (08/02/2019), Disorder of the skin and subcutaneous tissue, unspecified (08/06/2020), Encounter for general adult medical examination without abnormal findings (10/01/2020), Encounter for general adult medical examination without abnormal findings (10/08/2021), Encounter for immunization (10/08/2021), Encounter for other screening for malignant neoplasm of breast (08/06/2021), Encounter for other screening for malignant neoplasm of breast, Encounter for screening for malignant neoplasm of colon (06/24/2019), Encounter for screening for malignant neoplasm of colon, Encounter for screening for osteoporosis (10/01/2020), Headache, unspecified (09/20/2019), Other conditions influencing health status (03/26/2020), Other disturbances of skin sensation (08/06/2020), Other specified disorders of the skin and subcutaneous tissue (06/24/2019), Pain in unspecified ankle and joints of unspecified foot, Pain in unspecified hip (03/25/2020), Personal history of other diseases of the nervous system and  sense organs, Personal history of other diseases of the nervous system and sense organs (04/14/2021), Personal history of other diseases of the respiratory system (12/17/2021), Personal history of other diseases of the respiratory system (04/14/2021), Personal history of other diseases of the respiratory system (12/17/2020), Personal history of other diseases of the respiratory system, Personal history of other drug therapy (10/06/2020), Personal history of other drug therapy (01/02/2020), Personal history of other drug therapy (10/28/2019), Personal history of other infectious and parasitic diseases (05/22/2019), Personal history of other malignant neoplasm of skin (05/24/2022), Personal history of other medical treatment (08/23/2022), Personal history of other mental and behavioral disorders, Personal history of other mental and behavioral disorders (05/30/2022), and Personal history of urinary (tract) infections (06/18/2021).    Surgical History  She has a past surgical history that includes Other surgical history (04/25/2019) and Other surgical history (04/25/2019).     Social History  She reports that she has never smoked. She has never used smokeless tobacco. She reports that she does not currently use alcohol. She reports that she does not use drugs.    Family History  No family history on file.     Allergies  Azithromycin, Cephalexin, Ciprofloxacin, Hydrocodone-guaifenesin, Oxycodone-acetaminophen, and Propoxyphene-acetaminophen    Review of Systems   Unable to obtain  Physical Exam   Constitutional: A&Ox0, cannot follow commands, NAD, resting comfortable   Head and Face: Atraumatic, normocephalic   Eyes: Normal external exam, EOMI  Cardiovascular: RRR, S1/S2, no murmurs, rubs, or gallops, radial pulses +2  Pulmonary: CTAB, no respiratory distress, no wheezing, rales or rhonchi, on RA  Abdomen: +BS, soft, non-tender, nondistended, no guarding rigidity or rebound tenderness, no masses noted  MSK: Negative for  edema, No joint swelling, normal movements of all extremities.  R arm in sling  Neuro: cannot perform neuro exam as patient cannot follow commands, moves all 4 extremities simultaneously   Skin- No lesions, contusions, or erythema.  Psychiatric: A&Ox0, answers some yes/no questions, cannot follow commands   Last Recorded Vitals  /75   Pulse 75   Temp 37.1 °C (98.8 °F)   Resp 16   Wt 63.5 kg (140 lb)   SpO2 95%     Relevant Results  Results for orders placed or performed during the hospital encounter of 10/08/24 (from the past 24 hour(s))   SST TOP   Result Value Ref Range    Extra Tube Hold for add-ons.    CBC and Auto Differential   Result Value Ref Range    WBC 12.4 (H) 4.4 - 11.3 x10*3/uL    nRBC 0.0 0.0 - 0.0 /100 WBCs    RBC 3.75 (L) 4.00 - 5.20 x10*6/uL    Hemoglobin 11.4 (L) 12.0 - 16.0 g/dL    Hematocrit 36.4 36.0 - 46.0 %    MCV 97 80 - 100 fL    MCH 30.4 26.0 - 34.0 pg    MCHC 31.3 (L) 32.0 - 36.0 g/dL    RDW 14.0 11.5 - 14.5 %    Platelets 201 150 - 450 x10*3/uL    Neutrophils % 77.4 40.0 - 80.0 %    Immature Granulocytes %, Automated 0.2 0.0 - 0.9 %    Lymphocytes % 11.1 13.0 - 44.0 %    Monocytes % 10.3 2.0 - 10.0 %    Eosinophils % 0.6 0.0 - 6.0 %    Basophils % 0.4 0.0 - 2.0 %    Neutrophils Absolute 9.57 (H) 1.60 - 5.50 x10*3/uL    Immature Granulocytes Absolute, Automated 0.03 0.00 - 0.50 x10*3/uL    Lymphocytes Absolute 1.38 0.80 - 3.00 x10*3/uL    Monocytes Absolute 1.27 (H) 0.05 - 0.80 x10*3/uL    Eosinophils Absolute 0.08 0.00 - 0.40 x10*3/uL    Basophils Absolute 0.05 0.00 - 0.10 x10*3/uL   Comprehensive metabolic panel   Result Value Ref Range    Glucose 201 (H) 74 - 99 mg/dL    Sodium 142 136 - 145 mmol/L    Potassium 3.7 3.5 - 5.3 mmol/L    Chloride 106 98 - 107 mmol/L    Bicarbonate 29 21 - 32 mmol/L    Anion Gap 11 10 - 20 mmol/L    Urea Nitrogen 15 6 - 23 mg/dL    Creatinine 0.58 0.50 - 1.05 mg/dL    eGFR >90 >60 mL/min/1.73m*2    Calcium 9.9 8.6 - 10.3 mg/dL    Albumin 3.9 3.4  - 5.0 g/dL    Alkaline Phosphatase 84 33 - 136 U/L    Total Protein 6.6 6.4 - 8.2 g/dL    AST 22 9 - 39 U/L    Bilirubin, Total 0.8 0.0 - 1.2 mg/dL    ALT 23 7 - 45 U/L   Magnesium   Result Value Ref Range    Magnesium 1.66 1.60 - 2.40 mg/dL   Protime-INR   Result Value Ref Range    Protime 12.2 9.8 - 12.8 seconds    INR 1.1 0.9 - 1.1         Assessment/Plan   Assessment & Plan  Fall in shower  Advanced dementia  NIDDM2  HLD  Essential HTN    74 YOF PMH alzheimer's (A&Ox0), NIDDM2, HLD, HTN  presenting [facility] ED s/p fall.  Found to have Comminuted impacted displaced fracturing of the proximal humerus. Case was discussed with on-call ortho who recommended non-operative management.  Family requesting placement.    - CBC,CMP, INR pending  - PT/OT  - Pain control   - Ortho consulted  - Resume home medications     IVF: None  DVT Ppx: lovenox  Diet: Regular diet  Code Status: Attempted to call  POA, but no answer, patient to remain full code    Anticipate 1 midnight stay, complexity moderate         Jhon Valverde DO

## 2024-10-09 NOTE — PROGRESS NOTES
10/09/24 1028   Discharge Planning   Living Arrangements Spouse/significant other   Support Systems Spouse/significant other   Assistance Needed yes   Type of Residence Private residence   Number of Stairs to Enter Residence 1   Number of Stairs Within Residence 0   Home or Post Acute Services   (TBD pending therapy)   Expected Discharge Disposition Home   Financial Resource Strain   How hard is it for you to pay for the very basics like food, housing, medical care, and heating? Not hard   Housing Stability   In the last 12 months, was there a time when you were not able to pay the mortgage or rent on time? N   At any time in the past 12 months, were you homeless or living in a shelter (including now)? N   Transportation Needs   In the past 12 months, has lack of transportation kept you from medical appointments or from getting medications? no   In the past 12 months, has lack of transportation kept you from meetings, work, or from getting things needed for daily living? No     Met at bedside with patient and her caregiver (Coreen) to explain my role in discharge planning. Patient disoriented, all questions answered by caregiver. Coreen states patient lives at home with her  and her and another caregiver take turns being there 11-12 hrs a day every day. Caregivers provide all care during the day and then leave once patient is in bed for the night. PCP is Dr Alegria. Per Coreen, we can call her  to verify d/c plan, but she believes they want her to return home with private pay caregivers. Therapy evals pending at this time. CT team to follow. Attempt to call patient's , Kenan, on home number unsuccessful. Message left for him to call back for d/c planning.     1045: Spoke to patient's , Kenan, on the phone. He stated as long patient is able to stand up move a little, he would prefer her to come home with him and the private pay caregivers. Kenan would like a call back after she is seen by  therapy.     1210: Received phone call from patient's caregiver regarding d/c planning. She states they (her and patient's ) would like to try and see if patient can get into [facility]. [facility] and  [facility] second choice. Message sent to [facility] requesting to send [facility] referrals.     1455:  [facility] unable to accept patient. Still waiting for [facility] to reply. Spoke caregiver Coreen at bedside with Kenan on the phone to update them on [facility]. [facility] is still reviewing at this time. Kenan states if he has to private pay for a few weeks at a SNF he would. SNF list provided. CT team to follow up.

## 2024-10-09 NOTE — CONSULTS
Reason For Consult  Right proximal humerus fracture    History Of Present Illness  Raissa Puentes is a 74 y.o. female presenting with Alzheimer's dementia status post fall.  X-rays demonstrated proximal humerus fracture.  Orthopedic team was contacted for evaluation and treatment.  History obtained per chart and bedside caregiver as patient has advanced dementia and offers no additional details.     Past Medical History  She has a past medical history of Acute candidiasis of vulva and vagina (08/02/2019), Acute recurrent maxillary sinusitis (09/20/2019), Candidiasis, unspecified (03/26/2020), Candidiasis, unspecified (08/02/2019), Disorder of the skin and subcutaneous tissue, unspecified (08/06/2020), Encounter for general adult medical examination without abnormal findings (10/01/2020), Encounter for general adult medical examination without abnormal findings (10/08/2021), Encounter for immunization (10/08/2021), Encounter for other screening for malignant neoplasm of breast (08/06/2021), Encounter for other screening for malignant neoplasm of breast, Encounter for screening for malignant neoplasm of colon (06/24/2019), Encounter for screening for malignant neoplasm of colon, Encounter for screening for osteoporosis (10/01/2020), Headache, unspecified (09/20/2019), Other conditions influencing health status (03/26/2020), Other disturbances of skin sensation (08/06/2020), Other specified disorders of the skin and subcutaneous tissue (06/24/2019), Pain in unspecified ankle and joints of unspecified foot, Pain in unspecified hip (03/25/2020), Personal history of other diseases of the nervous system and sense organs, Personal history of other diseases of the nervous system and sense organs (04/14/2021), Personal history of other diseases of the respiratory system (12/17/2021), Personal history of other diseases of the respiratory system (04/14/2021), Personal history of other diseases of the respiratory system  "(12/17/2020), Personal history of other diseases of the respiratory system, Personal history of other drug therapy (10/06/2020), Personal history of other drug therapy (01/02/2020), Personal history of other drug therapy (10/28/2019), Personal history of other infectious and parasitic diseases (05/22/2019), Personal history of other malignant neoplasm of skin (05/24/2022), Personal history of other medical treatment (08/23/2022), Personal history of other mental and behavioral disorders, Personal history of other mental and behavioral disorders (05/30/2022), and Personal history of urinary (tract) infections (06/18/2021).    Surgical History  She has a past surgical history that includes Other surgical history (04/25/2019) and Other surgical history (04/25/2019).     Social History  She reports that she has never smoked. She has never used smokeless tobacco. She reports current alcohol use of about 2.0 standard drinks of alcohol per week. She reports that she does not use drugs.    Family History  No family history on file.     Allergies  Azithromycin, Cephalexin, Ciprofloxacin, Hydrocodone-guaifenesin, Oxycodone-acetaminophen, and Propoxyphene-acetaminophen    Review of Systems  Unable to perform secondary to altered mentation     Physical Exam  Tenderness about the right proximal humerus.  Pain with any type of active or passive range of motion to the right shoulder.  Limb well-perfused.  No open wounds.  Supple compartments to arm forearm and hand.  Sensory and motor function difficult to test secondary to altered cognition.     Last Recorded Vitals  Blood pressure 124/59, pulse 102, temperature 37.6 °C (99.7 °F), temperature source Temporal, resp. rate 18, height 1.651 m (5' 5\"), weight 64.7 kg (142 lb 10.2 oz), SpO2 90%.    Relevant Results      Scheduled medications  buPROPion SR, 150 mg, oral, Daily  citalopram, 10 mg, oral, Daily  donepezil, 10 mg, oral, BID  enoxaparin, 40 mg, subcutaneous, q24h  insulin " lispro, 0-10 Units, subcutaneous, TID  losartan, 25 mg, oral, Daily  memantine, 10 mg, oral, Daily  polyethylene glycol, 17 g, oral, Daily  QUEtiapine, 12.5 mg, oral, BID  QUEtiapine, 25 mg, oral, Nightly      Continuous medications     PRN medications  PRN medications: acetaminophen, oxyCODONE, oxyCODONE  Results for orders placed or performed during the hospital encounter of 10/08/24 (from the past 24 hour(s))   SST TOP   Result Value Ref Range    Extra Tube Hold for add-ons.    CBC and Auto Differential   Result Value Ref Range    WBC 12.4 (H) 4.4 - 11.3 x10*3/uL    nRBC 0.0 0.0 - 0.0 /100 WBCs    RBC 3.75 (L) 4.00 - 5.20 x10*6/uL    Hemoglobin 11.4 (L) 12.0 - 16.0 g/dL    Hematocrit 36.4 36.0 - 46.0 %    MCV 97 80 - 100 fL    MCH 30.4 26.0 - 34.0 pg    MCHC 31.3 (L) 32.0 - 36.0 g/dL    RDW 14.0 11.5 - 14.5 %    Platelets 201 150 - 450 x10*3/uL    Neutrophils % 77.4 40.0 - 80.0 %    Immature Granulocytes %, Automated 0.2 0.0 - 0.9 %    Lymphocytes % 11.1 13.0 - 44.0 %    Monocytes % 10.3 2.0 - 10.0 %    Eosinophils % 0.6 0.0 - 6.0 %    Basophils % 0.4 0.0 - 2.0 %    Neutrophils Absolute 9.57 (H) 1.60 - 5.50 x10*3/uL    Immature Granulocytes Absolute, Automated 0.03 0.00 - 0.50 x10*3/uL    Lymphocytes Absolute 1.38 0.80 - 3.00 x10*3/uL    Monocytes Absolute 1.27 (H) 0.05 - 0.80 x10*3/uL    Eosinophils Absolute 0.08 0.00 - 0.40 x10*3/uL    Basophils Absolute 0.05 0.00 - 0.10 x10*3/uL   Comprehensive metabolic panel   Result Value Ref Range    Glucose 201 (H) 74 - 99 mg/dL    Sodium 142 136 - 145 mmol/L    Potassium 3.7 3.5 - 5.3 mmol/L    Chloride 106 98 - 107 mmol/L    Bicarbonate 29 21 - 32 mmol/L    Anion Gap 11 10 - 20 mmol/L    Urea Nitrogen 15 6 - 23 mg/dL    Creatinine 0.58 0.50 - 1.05 mg/dL    eGFR >90 >60 mL/min/1.73m*2    Calcium 9.9 8.6 - 10.3 mg/dL    Albumin 3.9 3.4 - 5.0 g/dL    Alkaline Phosphatase 84 33 - 136 U/L    Total Protein 6.6 6.4 - 8.2 g/dL    AST 22 9 - 39 U/L    Bilirubin, Total 0.8 0.0 -  1.2 mg/dL    ALT 23 7 - 45 U/L   Magnesium   Result Value Ref Range    Magnesium 1.66 1.60 - 2.40 mg/dL   Protime-INR   Result Value Ref Range    Protime 12.2 9.8 - 12.8 seconds    INR 1.1 0.9 - 1.1   Urinalysis with Reflex Culture and Microscopic   Result Value Ref Range    Color, Urine Yellow Light-Yellow, Yellow, Dark-Yellow    Appearance, Urine Clear Clear    Specific Gravity, Urine 1.033 1.005 - 1.035    pH, Urine 5.0 5.0, 5.5, 6.0, 6.5, 7.0, 7.5, 8.0    Protein, Urine 20 (TRACE) NEGATIVE, 10 (TRACE), 20 (TRACE) mg/dL    Glucose, Urine 1000 (4+) (A) Normal mg/dL    Blood, Urine NEGATIVE NEGATIVE    Ketones, Urine 10 (1+) (A) NEGATIVE mg/dL    Bilirubin, Urine NEGATIVE NEGATIVE    Urobilinogen, Urine Normal Normal mg/dL    Nitrite, Urine 2+ (A) NEGATIVE    Leukocyte Esterase, Urine 250 Zeyad/µL (A) NEGATIVE   Microscopic Only, Urine   Result Value Ref Range    WBC, Urine 11-20 (A) 1-5, NONE /HPF    RBC, Urine 3-5 NONE, 1-2, 3-5 /HPF    Squamous Epithelial Cells, Urine 1-9 (SPARSE) Reference range not established. /HPF    Bacteria, Urine 4+ (A) NONE SEEN /HPF    Mucus, Urine FEW Reference range not established. /LPF    Hyaline Casts, Urine 1+ (A) NONE /LPF        Assessment/Plan     Assessment: Right proximal humerus fracture    Plan: Recommendations are made for nonoperative management by way of nonweightbearing and sling immobilization for comfort.  Will continue to follow closely.  Follow-up with me in 2 weeks in the office to confirm adequacy of alignment and to discuss ongoing treatment strategies.  I did reach out to family on 2 separate occasions but was unable to establish contact to discuss ongoing management strategies.  Certainly operative options exist if they want to go that direction.  Will continue to try and reach family to help with medical decision making.  As for now nonoperative management nonweightbearing sling for comfort and ice to the shoulder.    Davin Garcia, DO

## 2024-10-09 NOTE — CARE PLAN
The patient's goals for the shift include no falls/ able to rest    The clinical goals for the shift include no falls    Over the shift, the patient did not make progress toward the following goals. Barriers to progression include . Recommendations to address these barriers include .    Problem: Skin  Goal: Decreased wound size/increased tissue granulation at next dressing change  Outcome: Progressing  Goal: Participates in plan/prevention/treatment measures  Outcome: Progressing  Goal: Prevent/manage excess moisture  Outcome: Progressing  Goal: Prevent/minimize sheer/friction injuries  Outcome: Progressing  Goal: Promote/optimize nutrition  Outcome: Progressing  Goal: Promote skin healing  Outcome: Progressing     Problem: Fall/Injury  Goal: Not fall by end of shift  Outcome: Progressing  Goal: Be free from injury by end of the shift  Outcome: Progressing  Goal: Verbalize understanding of personal risk factors for fall in the hospital  Outcome: Progressing  Goal: Verbalize understanding of risk factor reduction measures to prevent injury from fall in the home  Outcome: Progressing  Goal: Use assistive devices by end of the shift  Outcome: Progressing  Goal: Pace activities to prevent fatigue by end of the shift  Outcome: Progressing     Problem: Respiratory  Goal: Clear secretions with interventions this shift  Outcome: Progressing  Goal: Minimize anxiety/maximize coping throughout shift  Outcome: Progressing  Goal: Minimal/no exertional discomfort or dyspnea this shift  Outcome: Progressing  Goal: No signs of respiratory distress (eg. Use of accessory muscles. Peds grunting)  Outcome: Progressing  Goal: Patent airway maintained this shift  Outcome: Progressing  Goal: Tolerate mechanical ventilation evidenced by VS/agitation level this shift  Outcome: Progressing  Goal: Tolerate pulmonary toileting this shift  Outcome: Progressing  Goal: Verbalize decreased shortness of breath this shift  Outcome:  Progressing  Goal: Wean oxygen to maintain O2 saturation per order/standard this shift  Outcome: Progressing  Goal: Increase self care and/or family involvement in next 24 hours  Outcome: Progressing

## 2024-10-09 NOTE — PROGRESS NOTES
Physical Therapy    Physical Therapy Evaluation    Patient Name: Raissa Puentes  MRN: [id number]  Today's Date: 10/9/2024   Time Calculation  Start Time: 1029  Stop Time: 1102  Time Calculation (min): 33 min  3110/3110-A    Assessment/Plan   PT Assessment: Pt demonstrates decreased strength, decreased cognition, increased pain, decreased activity tolerance, and impaired balance/mobility.  Pt appears below baseline level of function and based on current level of function, pt would benefit from continued skilled therapy while in the hospital to ensure safety, decrease risk of falls, and regain strength/mobility back to baseline.  Once stable enough for discharge, pt would benefit from moderate intensity therapy.     PT Assessment Results: Decreased strength, Decreased range of motion, Impaired balance, Decreased mobility, Decreased cognition, Impaired judgement, Decreased safety awareness, Pain  Rehab Prognosis: Fair (2/2 to cognition)  Evaluation/Treatment Tolerance: Patient limited by pain  Medical Staff Made Aware: Yes  End of Session Communication: Bedside nurse, Care Coordinator  End of Session Patient Position: Bed, 3 rail up, Alarm on (caregiver at bedside; ortho team entering toom)  IP OR SWING BED PT PLAN  Inpatient or Swing Bed: Inpatient  PT Plan  Treatment/Interventions: Bed mobility, Transfer training, Gait training, Stair training, Balance training, Neuromuscular re-education, Strengthening, Therapeutic exercise, Therapeutic activity, Home exercise program  PT Plan: Ongoing PT  PT Frequency: 3 times per week  PT Discharge Recommendations: Moderate intensity level of continued care  Equipment Recommended upon Discharge: Wheelchair (LRAD for gait)  PT Recommended Transfer Status:  (Dep A x2)  PT - OK to Discharge: Yes - To next level of care when cleared by medical team    Subjective     Current Problem:  1. Fall in shower  acetaminophen (Tylenol) 325 mg tablet    polyethylene glycol (Glycolax, Miralax)  17 gram packet    oxyCODONE (Roxicodone) 5 mg immediate release tablet      2. Recurrent major depressive disorder, in partial remission (CMS-HCC)        3. Psychophysiological insomnia  QUEtiapine (SEROquel) 25 mg tablet      4. Irritability  QUEtiapine (SEROquel) 25 mg tablet          Past Medical History:  Patient Active Problem List   Diagnosis    Type 2 diabetes mellitus without complication, without long-term current use of insulin (Multi)    Balance problem    Carotid stenosis    Depression    Early onset Alzheimer's dementia without behavioral disturbance (Multi)    Gait abnormality    Hypertension    Hyperlipidemia    Lumbar facet arthropathy    Memory changes    Osteoarthritis of both hands    Osteoarthritis of spine with radiculopathy, lumbar region    Pernicious anemia    Recurrent cold sores    Varicose veins of bilateral lower extremities with other complications    Medicare annual wellness visit, subsequent    Low back pain    Severe major neurocognitive disorder due to Alzheimer's disease with behavioral disturbance (Multi)    Fall in shower       General Visit Information:  Per EMR: pt presenting to [facility] ED s/p fall.     Due to baseline mentation of A&Ox0 could not obtain HPI, attempted to call  but he did not pickup. All info comes from ED signout and chart review.  Patient slipped and fell in the shower yesterday.  Due to continued pain was brought to the ER.  She normal can get up on her own, but was unable to do that due to pain.     Vitals WNL. CT C spine, L spine negative. CT head negative.  CT abd/pelvis negative but did show distended bladder. R shoulder xray: Comminuted impacted displaced fracturing of the proximal humerus. Case was discussed with on-call ortho who recommended non-operative management.    (+) R comminuted impacted displaced proximal humerus fracture     On arrival, pt supine in bed.  Pt in no apparent distress and agreeable to therapy.    General  Reason for  Referral: impaired mobility, gait training, impaired cognition/safety awareness  Referred By: Alberto Piper  Past Medical History Relevant to Rehab: dementia  Family/Caregiver Present: Yes  Caregiver Feedback: pt's HHA present  Co-Treatment: OT  Prior to Session Communication: Bedside nurse  Patient Position Received: Bed, 3 rail up, Alarm on (RUE sling donned)    Home Living/PLOF:  History taken from caregiver at bedside.  Pt lives in house with  with 1 DON.  1 floor set up.  Sleeps in hospital bed.  Has 2 full time caregivers totaling 11 hrs per day.  Has shower chair and BSC. Uses rollator for mobility with supervision.  Aides assist with transfers, bathing, feeding as needed.  Aide states that pt's  is on 7L of O2 so is only able to assist to an extent.    Precautions:  Precautions  UE Weight Bearing Status: Right Non-Weight Bearing (sling for comfort)  Medical Precautions: Fall precautions     Objective     Pain:  Pain Assessment  Pain Assessment:  (pt unable to express site of pain but yells and appears to grimace while assisted with mobility; inconsistently yells out when BLE are moved/touched; appears to guard RUE)    Cognition:  Cognition  Overall Cognitive Status: Impaired at baseline  Orientation Level: Disoriented X4 (pt unable to communicate with staff 2/2 to decreased cognition; pt states she is not able to communicate much at baseline either)    General Assessments:      Activity Tolerance  Endurance: Decreased tolerance for upright activites  Sensation  Sensation Comment: unable to assess 2/2 to decreased cognition    Postural Control  Posture Comment: pt with L sided lean in supine and sitting EOB  Static Sitting Balance  Static Sitting-Comment/Number of Minutes: fair minus; L sided lean requiring Mod A to maintain sit balance  Dynamic Sitting Balance  Dynamic Sitting-Comments: poor  Static Standing Balance  Static Standing-Comment/Number of Minutes: poor  Dynamic Standing  Balance  Dynamic Standing-Comments: poor    Extremity/Trunk Assessments:  Unable to formally assess 2/2 to inability to follow commands; attempted to move BLE towards EOB and pt shrieks in pain; caregiver states that pt was fearful, but was able to WB and ambulate minimally at home after fall    Functional Mobility:  Bed mobility  Supine to sit: Dep A x2; using draw sheet; pt shrieks and stiffens entire body with mobility  Pt sat EOB for ~3-4 minutes with Mod A for sit balance; mild L sided lean; pt very fearful and anxious with all movement     Sit to supine: Dep A x2; using draw sheet    Transfers  Sit to stand: unable to lift buttocks from EOB despite Max A x2; pt seems resistant to movement    Ambulation/Stairs  Unable to attempt     Outcome Measures:  [facility] Basic Mobility  Turning from your back to your side while in a flat bed without using bedrails: Total  Moving from lying on your back to sitting on the side of a flat bed without using bedrails: Total  Moving to and from bed to chair (including a wheelchair): Total  Standing up from a chair using your arms (e.g. wheelchair or bedside chair): Total  To walk in hospital room: Total  Climbing 3-5 steps with railing: Total  Basic Mobility - Total Score: 6    Goals:  Encounter Problems       Encounter Problems (Active)       PT Problem       Pt will be able to perform all bed mobility tasks with Mod A.  (Progressing)       Start:  10/09/24    Expected End:  10/23/24            Pt will perform all transfers with Mod A and LRAD with proper safety mechanics.   (Progressing)       Start:  10/09/24    Expected End:  10/23/24            Pt will ambulate 10 ft with Mod A using LRAD for improved functional independence.  (Progressing)       Start:  10/09/24    Expected End:  10/23/24            Pt will be able to negotiate 1 step with 1 HR with Mod A.  (Progressing)       Start:  10/09/24    Expected End:  10/23/24                 Education Documentation  Precautions,  taught by Karen Ortega, PT at 10/9/2024  2:30 PM.  Learner: Caregiver  Readiness: Acceptance  Method: Explanation  Response: Verbalizes Understanding    Body Mechanics, taught by Karen Ortega, PT at 10/9/2024  2:30 PM.  Learner: Caregiver  Readiness: Acceptance  Method: Explanation  Response: Verbalizes Understanding    Home Exercise Program, taught by Karen Ortega, PT at 10/9/2024  2:30 PM.  Learner: Caregiver  Readiness: Acceptance  Method: Explanation  Response: Verbalizes Understanding    Mobility Training, taught by Karen Ortega, PT at 10/9/2024  2:30 PM.  Learner: Caregiver  Readiness: Acceptance  Method: Explanation  Response: Verbalizes Understanding    Precautions, taught by Karen Ortega, PT at 10/9/2024  2:30 PM.  Learner: Patient  Readiness: Acceptance  Method: Explanation  Response: No Evidence of Learning    Body Mechanics, taught by Karen Ortega, PT at 10/9/2024  2:30 PM.  Learner: Patient  Readiness: Acceptance  Method: Explanation  Response: No Evidence of Learning    Home Exercise Program, taught by Karen Ortega, PT at 10/9/2024  2:30 PM.  Learner: Patient  Readiness: Acceptance  Method: Explanation  Response: No Evidence of Learning    Mobility Training, taught by Karen Ortega, PT at 10/9/2024  2:30 PM.  Learner: Patient  Readiness: Acceptance  Method: Explanation  Response: No Evidence of Learning    Education Comments  No comments found.

## 2024-10-09 NOTE — HOSPITAL COURSE
74-year-old female with past medical history of Alzheimer's dementia, non-insulin-dependent diabetes mellitus, hyperlipidemia, hypertension presented after mechanical fall injuring the right proximal humerus fracture.  Patient was admitted and had a sling placed.  Orthopedic surgery was consulted and the recommended nonoperative management by way of nonweightbearing and sling immobilization for comfort.  Close 2-week follow-up with Dr. Davin Garcia.  Patient's family is interested in going to rehab if possible.  She will be discharged to acute rehab once arranged.  If not they would like to take her home with private pay with Home health.    24 minutes spent in discharge timing

## 2024-10-09 NOTE — PROGRESS NOTES
Occupational Therapy    Occupational Therapy    Evaluation    Patient Name: Raissa Puentes  MRN: [id number]  Today's Date: 10/9/2024  Time Calculation  Start Time: 1029  Stop Time: 1102  Time Calculation (min): 33 min  3110/3110-A    Assessment  IP OT Assessment  OT Assessment: Pt with flat affect and not able to answer any questions or follow any directions. Pt with dementia,and all information provided by caregiver that was present during eval. Pt requires total assist with ADL's and mobility. Recommend SNF to increase safety and independence with ADL's.  Prognosis: Fair  End of Session Communication: Bedside nurse, Care Coordinator  End of Session Patient Position: Bed, 3 rail up, Alarm on (caregiver at bedside; ortho team entering too)    Plan:  Treatment Interventions: ADL retraining, Functional transfer training, UE strengthening/ROM, Endurance training, Cognitive reorientation, Neuromuscular reeducation  OT Frequency: 3 times per week  OT Discharge Recommendations: Moderate intensity level of continued care (SNF)  Equipment Recommended upon Discharge: Wheelchair (LRAD for gait)  OT Recommended Transfer Status: Dependent, Assist of 2  OT - OK to Discharge: Yes (to next level of care)    Subjective     Current Problem:  1. Fall in shower  acetaminophen (Tylenol) 325 mg tablet    polyethylene glycol (Glycolax, Miralax) 17 gram packet    oxyCODONE (Roxicodone) 5 mg immediate release tablet      2. Recurrent major depressive disorder, in partial remission (CMS-HCC)        3. Psychophysiological insomnia  QUEtiapine (SEROquel) 25 mg tablet      4. Irritability  QUEtiapine (SEROquel) 25 mg tablet          General:  General  Reason for Referral: ADL's; Safety Assessment  Referred By: Alberto Piper  Past Medical History Relevant to Rehab: dementia  Family/Caregiver Present: Yes  Caregiver Feedback: pt's HHA present  Co-Treatment: PT  Co-Treatment Reason: safety  Prior to Session Communication: Bedside nurse  Patient  Position Received: Bed, 3 rail up, Alarm on (RUE sling donned)    Per EMR (ortho):   History Of Present Illness  Raissa Puentes is a 74 y.o. female presenting with Alzheimer's dementia status post fall.  X-rays demonstrated proximal humerus fracture.  Orthopedic team was contacted for evaluation and treatment.  History obtained per chart and bedside caregiver as patient has advanced dementia and offers no additional details.    Vitals WNL. CT C spine, L spine negative. CT head negative.  CT abd/pelvis negative but did show distended bladder. R shoulder xray: Comminuted impacted displaced fracturing of the proximal humerus. Case was discussed with on-call ortho who recommended non-operative management.     (+) R comminuted impacted displaced proximal humerus fracture      Plan: Recommendations are made for nonoperative management by way of nonweightbearing and sling immobilization for comfort.  Will continue to follow closely.  Follow-up with me in 2 weeks in the office to confirm adequacy of alignment and to discuss ongoing treatment strategies.  I did reach out to family on 2 separate occasions but was unable to establish contact to discuss ongoing management strategies.  Certainly operative options exist if they want to go that direction.  Will continue to try and reach family to help with medical decision making.  As for now nonoperative management nonweightbearing sling for comfort and ice to the shoulder.    Precautions:  UE Weight Bearing Status: Right Non-Weight Bearing (sling for comfort)  Medical Precautions: Fall precautions  Precautions Comment: bed/chair alarm    Pain:  Pain Assessment  Pain Assessment: 0-10 (pt unable to express site of pain but yells and appears to grimace while assisted with mobility; inconsistently yells out when BLE are moved/touched; appears to guard RUE)    Objective     Cognition:  Overall Cognitive Status: Impaired at baseline  Orientation Level: Disoriented X4 (pt unable to  communicate with staff 2/2 to decreased cognition; pt states she is not able to communicate much at baseline either)  Safety/Judgement:  (Pt with impaired judgement and poor safety)  Impulsive:  (Pt impulsive and moving R UE throughout eval)  Processing Speed:  (Pt was not able to process and follow any directions)    Home Living/PLOF:  History taken from caregiver at bedside.  Pt lives in house with  with 1 DON.  1 floor set up.  Sleeps in hospital bed.  Has 2 full time caregivers totaling 11 hrs per day.  Has shower chair and BSC. Uses rollator for mobility with supervision.  Aides assist with transfers, bathing, feeding as needed.  Aide states that pt's  is on 7L of O2 so is only able to assist to an extent.    ADL:  Eating Assistance: Total  Grooming Assistance: Total  Bathing Assistance: Total  UE Dressing Assistance: Total  LE Dressing Assistance: Total  Toileting Assistance with Device: Total  ADL Comments: Pt requires assist with all ADL's    Activity Tolerance:  Endurance: Decreased tolerance for upright activites    Functional Mobility:  Bed mobility  Supine to sit: Dep A x2; using draw sheet; pt shrieks and stiffens entire body with mobility  Pt sat EOB for ~3-4 minutes with Mod A for sit balance; mild L sided lean; pt very fearful and anxious with all movement      Sit to supine: Dep A x2; using draw sheet     Transfers  Sit to stand: unable to lift buttocks from EOB despite Max A x2; pt seems resistant to movement     Ambulation/Stairs  Unable to attempt    Sitting Balance:  Static Sitting Balance  Static Sitting-Comment/Number of Minutes: poor  Dynamic Sitting Balance  Dynamic Sitting-Comments: poor    Standing Balance:  Static Standing Balance  Static Standing-Comment/Number of Minutes: Pt was not able to stand    Sensation:  Sensation Comment: unable to assess 2/2 to decreased cognition    Strength:  Strength Comments: L UE WFL; R UE not tested    Extremities: RUE   RUE : Exceptions to  WFL (limietd mobility secondary to humerus fracture) and LUE   LUE: Within Functional Limits    Outcome Measures: [facility] Daily Activity  Putting on and taking off regular lower body clothing: Total  Bathing (including washing, rinsing, drying): Total  Putting on and taking off regular upper body clothing: Total  Toileting, which includes using toilet, bedpan or urinal: Total  Taking care of personal grooming such as brushing teeth: Total  Eating Meals: Total  Daily Activity - Total Score: 6       EDUCATION:  Education  Individual(s) Educated: Patient  Education Provided:  (safety/precautions)  Education Comment: Pt was not able to retain any education  Education Documentation  Body Mechanics, taught by Teresa Moscoso OT at 10/9/2024  2:35 PM.  Learner: Patient  Readiness: Acceptance  Method: Explanation  Response: Verbalizes Understanding    Precautions, taught by Teresa Moscoso OT at 10/9/2024  2:35 PM.  Learner: Patient  Readiness: Acceptance  Method: Explanation  Response: Verbalizes Understanding    Education Comments  No comments found.        Goals:   Encounter Problems       Encounter Problems (Active)       OT Goals       OT Goal 1 (Progressing)       Start:  10/09/24    Expected End:  10/23/24       Pt will complete all bed mobility with Min A safely            OT Goal 2 (Progressing)       Start:  10/09/24    Expected End:  10/23/24       Pt will complete ADL's and mobility with good sit balance            OT Goal 3 (Progressing)       Start:  10/09/24    Expected End:  10/23/24       Pt with complete all transfers safely with Mod A of 1-2                  Treatment: Pt was dependent with all ADL's and mobility. Pt was not able to follow simple directions, and required motivation and explanation of each steps to tasks. Pt was dependent of 2 to complete supine-sit and to straighten hips at EOB. Pt with poor sit balance and required assist to maintain sit balance. Pt was dependent to attempt to stand, and was  unsuccessful. Pt returned to supine in bed, dependent of 2 to position.  Pt remained in bed with bed alarm on and call light within reach.

## 2024-10-10 ENCOUNTER — APPOINTMENT (OUTPATIENT)
Dept: RADIOLOGY | Facility: HOSPITAL | Age: 74
End: 2024-10-10
Payer: MEDICARE

## 2024-10-10 LAB
ANION GAP SERPL CALC-SCNC: 11 MMOL/L (ref 10–20)
BUN SERPL-MCNC: 18 MG/DL (ref 6–23)
CALCIUM SERPL-MCNC: 9.8 MG/DL (ref 8.6–10.3)
CHLORIDE SERPL-SCNC: 105 MMOL/L (ref 98–107)
CO2 SERPL-SCNC: 29 MMOL/L (ref 21–32)
CREAT SERPL-MCNC: 0.44 MG/DL (ref 0.5–1.05)
EGFRCR SERPLBLD CKD-EPI 2021: >90 ML/MIN/1.73M*2
ERYTHROCYTE [DISTWIDTH] IN BLOOD BY AUTOMATED COUNT: 14 % (ref 11.5–14.5)
GLUCOSE BLD MANUAL STRIP-MCNC: 175 MG/DL (ref 74–99)
GLUCOSE BLD MANUAL STRIP-MCNC: 181 MG/DL (ref 74–99)
GLUCOSE BLD MANUAL STRIP-MCNC: 212 MG/DL (ref 74–99)
GLUCOSE BLD MANUAL STRIP-MCNC: 230 MG/DL (ref 74–99)
GLUCOSE SERPL-MCNC: 187 MG/DL (ref 74–99)
HCT VFR BLD AUTO: 35 % (ref 36–46)
HGB BLD-MCNC: 10.8 G/DL (ref 12–16)
MCH RBC QN AUTO: 30.8 PG (ref 26–34)
MCHC RBC AUTO-ENTMCNC: 30.9 G/DL (ref 32–36)
MCV RBC AUTO: 100 FL (ref 80–100)
NRBC BLD-RTO: 0 /100 WBCS (ref 0–0)
PLATELET # BLD AUTO: 176 X10*3/UL (ref 150–450)
POTASSIUM SERPL-SCNC: 3.6 MMOL/L (ref 3.5–5.3)
RBC # BLD AUTO: 3.51 X10*6/UL (ref 4–5.2)
SODIUM SERPL-SCNC: 141 MMOL/L (ref 136–145)
WBC # BLD AUTO: 8.6 X10*3/UL (ref 4.4–11.3)

## 2024-10-10 PROCEDURE — 2500000002 HC RX 250 W HCPCS SELF ADMINISTERED DRUGS (ALT 637 FOR MEDICARE OP, ALT 636 FOR OP/ED): Mod: MUE | Performed by: INTERNAL MEDICINE

## 2024-10-10 PROCEDURE — 73610 X-RAY EXAM OF ANKLE: CPT | Mod: RT

## 2024-10-10 PROCEDURE — 2500000002 HC RX 250 W HCPCS SELF ADMINISTERED DRUGS (ALT 637 FOR MEDICARE OP, ALT 636 FOR OP/ED): Mod: MUE

## 2024-10-10 PROCEDURE — 73590 X-RAY EXAM OF LOWER LEG: CPT | Mod: 50

## 2024-10-10 PROCEDURE — 99239 HOSP IP/OBS DSCHRG MGMT >30: CPT | Performed by: INTERNAL MEDICINE

## 2024-10-10 PROCEDURE — 2500000004 HC RX 250 GENERAL PHARMACY W/ HCPCS (ALT 636 FOR OP/ED)

## 2024-10-10 PROCEDURE — 82947 ASSAY GLUCOSE BLOOD QUANT: CPT | Mod: 59

## 2024-10-10 PROCEDURE — 96372 THER/PROPH/DIAG INJ SC/IM: CPT

## 2024-10-10 PROCEDURE — 99231 SBSQ HOSP IP/OBS SF/LOW 25: CPT | Performed by: PHYSICIAN ASSISTANT

## 2024-10-10 PROCEDURE — 73560 X-RAY EXAM OF KNEE 1 OR 2: CPT | Mod: RT

## 2024-10-10 PROCEDURE — 2500000001 HC RX 250 WO HCPCS SELF ADMINISTERED DRUGS (ALT 637 FOR MEDICARE OP): Performed by: INTERNAL MEDICINE

## 2024-10-10 PROCEDURE — 80048 BASIC METABOLIC PNL TOTAL CA: CPT | Performed by: INTERNAL MEDICINE

## 2024-10-10 PROCEDURE — G0378 HOSPITAL OBSERVATION PER HR: HCPCS

## 2024-10-10 PROCEDURE — 2500000001 HC RX 250 WO HCPCS SELF ADMINISTERED DRUGS (ALT 637 FOR MEDICARE OP)

## 2024-10-10 PROCEDURE — 85027 COMPLETE CBC AUTOMATED: CPT | Performed by: INTERNAL MEDICINE

## 2024-10-10 PROCEDURE — 73700 CT LOWER EXTREMITY W/O DYE: CPT | Mod: RT

## 2024-10-10 PROCEDURE — 73552 X-RAY EXAM OF FEMUR 2/>: CPT | Mod: RT

## 2024-10-10 PROCEDURE — 36415 COLL VENOUS BLD VENIPUNCTURE: CPT | Performed by: INTERNAL MEDICINE

## 2024-10-10 ASSESSMENT — COGNITIVE AND FUNCTIONAL STATUS - GENERAL
CLIMB 3 TO 5 STEPS WITH RAILING: TOTAL
STANDING UP FROM CHAIR USING ARMS: TOTAL
DRESSING REGULAR LOWER BODY CLOTHING: A LITTLE
TOILETING: TOTAL
DRESSING REGULAR UPPER BODY CLOTHING: A LITTLE
DRESSING REGULAR UPPER BODY CLOTHING: A LITTLE
DAILY ACTIVITIY SCORE: 12
DRESSING REGULAR LOWER BODY CLOTHING: A LITTLE
HELP NEEDED FOR BATHING: TOTAL
MOVING FROM LYING ON BACK TO SITTING ON SIDE OF FLAT BED WITH BEDRAILS: A LITTLE
WALKING IN HOSPITAL ROOM: TOTAL
MOBILITY SCORE: 11
CLIMB 3 TO 5 STEPS WITH RAILING: TOTAL
MOVING TO AND FROM BED TO CHAIR: A LOT
WALKING IN HOSPITAL ROOM: TOTAL
PERSONAL GROOMING: A LOT
MOVING FROM LYING ON BACK TO SITTING ON SIDE OF FLAT BED WITH BEDRAILS: A LITTLE
DAILY ACTIVITIY SCORE: 12
MOVING TO AND FROM BED TO CHAIR: A LOT
PERSONAL GROOMING: A LOT
MOBILITY SCORE: 11
EATING MEALS: A LOT
TURNING FROM BACK TO SIDE WHILE IN FLAT BAD: A LITTLE
TURNING FROM BACK TO SIDE WHILE IN FLAT BAD: A LITTLE
EATING MEALS: A LOT
STANDING UP FROM CHAIR USING ARMS: TOTAL
HELP NEEDED FOR BATHING: TOTAL
TOILETING: TOTAL

## 2024-10-10 ASSESSMENT — PAIN SCALES - PAIN ASSESSMENT IN ADVANCED DEMENTIA (PAINAD)
BREATHING: NORMAL
TOTALSCORE: MEDICATION (SEE MAR)
FACIALEXPRESSION: SMILING OR INEXPRESSIVE
TOTALSCORE: 1
BODYLANGUAGE: TENSE, DISTRESSED PACING, FIDGETING
CONSOLABILITY: NO NEED TO CONSOLE

## 2024-10-10 ASSESSMENT — PAIN DESCRIPTION - LOCATION: LOCATION: LEG

## 2024-10-10 ASSESSMENT — PAIN SCALES - GENERAL
PAINLEVEL_OUTOF10: 0 - NO PAIN
PAINLEVEL_OUTOF10: 2

## 2024-10-10 ASSESSMENT — PAIN DESCRIPTION - ORIENTATION: ORIENTATION: RIGHT;LEFT

## 2024-10-10 NOTE — CARE PLAN
The patient's goals for the shift include no falls/ able to rest    The clinical goals for the shift include Patient will remain free from injury through end of shift.    Patient to discharge to SNF this evening.  Problem: Pain  Goal: Walks with improved pain control throughout the shift  Outcome: Not Progressing

## 2024-10-10 NOTE — PROGRESS NOTES
[facility] is able to accept today, private pay.  Request to [facility] to send dc paperwork and pasrr, and arrange transport; confirmed for 8:00pm.  Facility, bedside RN and pt's  all notified.

## 2024-10-10 NOTE — PROGRESS NOTES
"Rasisa Puentes is a 74 y.o. female on day 0 of admission presenting with Fall in shower.    Subjective   Patient alert and oriented X 0. Caregiver at bedside tells me that she has been expressing pain when she attempts to stand with therapy.        Objective     Physical Exam  Vitals reviewed.   HENT:      Head: Normocephalic.      Mouth/Throat:      Mouth: Mucous membranes are moist.      Pharynx: Oropharynx is clear.   Eyes:      Extraocular Movements: Extraocular movements intact.   Cardiovascular:      Rate and Rhythm: Normal rate.   Pulmonary:      Effort: Pulmonary effort is normal.   Abdominal:      Palpations: Abdomen is soft.   Musculoskeletal:      Comments: Grimaces with movement of bilateral LE,  light touch sensation is intact, ankle dorsiflexion/plantar flexion is intact. DP 2+/2 palpable    Right shoulder edema, ecchymosis present. Motor function of hand/fingers demonstrated. Fingers pink with brisk refill.    Skin:     General: Skin is warm and dry.      Capillary Refill: Capillary refill takes less than 2 seconds.   Neurological:      Mental Status: She is alert. Mental status is at baseline.   Psychiatric:         Mood and Affect: Mood normal.         Last Recorded Vitals  Blood pressure 142/65, pulse 97, temperature 36.9 °C (98.4 °F), temperature source Temporal, resp. rate 20, height 1.651 m (5' 5\"), weight 64.7 kg (142 lb 10.2 oz), SpO2 95%.  Intake/Output last 3 Shifts:  I/O last 3 completed shifts:  In: 350 (5.4 mL/kg) [P.O.:350]  Out: 800 (12.4 mL/kg) [Urine:800 (0.3 mL/kg/hr)]  Weight: 64.7 kg     Relevant Results      Scheduled medications  buPROPion SR, 150 mg, oral, Daily  citalopram, 10 mg, oral, Daily  donepezil, 10 mg, oral, BID  enoxaparin, 40 mg, subcutaneous, q24h  insulin lispro, 0-10 Units, subcutaneous, TID  losartan, 25 mg, oral, Daily  memantine, 10 mg, oral, Daily  polyethylene glycol, 17 g, oral, Daily  QUEtiapine, 12.5 mg, oral, BID  QUEtiapine, 25 mg, oral, " Nightly      Continuous medications     PRN medications  PRN medications: acetaminophen, oxyCODONE, oxyCODONE  Results for orders placed or performed during the hospital encounter of 10/08/24 (from the past 24 hour(s))   POCT GLUCOSE   Result Value Ref Range    POCT Glucose 279 (H) 74 - 99 mg/dL   POCT GLUCOSE   Result Value Ref Range    POCT Glucose 184 (H) 74 - 99 mg/dL   POCT GLUCOSE   Result Value Ref Range    POCT Glucose 224 (H) 74 - 99 mg/dL   POCT GLUCOSE   Result Value Ref Range    POCT Glucose 223 (H) 74 - 99 mg/dL   POCT GLUCOSE   Result Value Ref Range    POCT Glucose 175 (H) 74 - 99 mg/dL   Basic Metabolic Panel   Result Value Ref Range    Glucose 187 (H) 74 - 99 mg/dL    Sodium 141 136 - 145 mmol/L    Potassium 3.6 3.5 - 5.3 mmol/L    Chloride 105 98 - 107 mmol/L    Bicarbonate 29 21 - 32 mmol/L    Anion Gap 11 10 - 20 mmol/L    Urea Nitrogen 18 6 - 23 mg/dL    Creatinine 0.44 (L) 0.50 - 1.05 mg/dL    eGFR >90 >60 mL/min/1.73m*2    Calcium 9.8 8.6 - 10.3 mg/dL   CBC   Result Value Ref Range    WBC 8.6 4.4 - 11.3 x10*3/uL    nRBC 0.0 0.0 - 0.0 /100 WBCs    RBC 3.51 (L) 4.00 - 5.20 x10*6/uL    Hemoglobin 10.8 (L) 12.0 - 16.0 g/dL    Hematocrit 35.0 (L) 36.0 - 46.0 %     80 - 100 fL    MCH 30.8 26.0 - 34.0 pg    MCHC 30.9 (L) 32.0 - 36.0 g/dL    RDW 14.0 11.5 - 14.5 %    Platelets 176 150 - 450 x10*3/uL             CT knee right wo IV contrast    Result Date: 10/10/2024  Interpreted By:  Joselo Mcdonald, STUDY: CT KNEE RIGHT WO IV CONTRAST; ;  10/10/2024 2:44 pm   INDICATION: Signs/Symptoms:Evaluate for lateral tibial plateau fracture acute versus remote healed fracture.     COMPARISON: None.   ACCESSION NUMBER(S): [id number]   ORDERING CLINICIAN: SAMAR GARLAND-AZIZ   TECHNIQUE: Contiguous axial images of the right knee obtained without intravenous contrast. Multiplanar reformations were obtained.   FINDINGS: Patient is osteopenic. There is a lateral tibial plateau fracture up to  6 mm of depression  anterolaterally. The fracture line is visible suggesting it is acute. No additional fractures are identified. Chondrocalcinosis is present. Small tricompartmental osteophytes are noted. Otherwise overall the joint spaces appear preserved. Small joint effusion is present. The ACL and PCL are grossly intact although not well evaluated by CT. The extensor mechanism grossly appears intact. The visualized musculature appears normal in bulk.       Right lateral tibial plateau fracture appears acute, with up to 6 mm of depression.     MACRO: None   Signed by: Joselo Mcdonald 10/10/2024 3:23 PM Dictation workstation:   [id number]    XR tibia fibula bilateral 2 views    Result Date: 10/10/2024  Interpreted By:  Joselo Mcdonald, STUDY: XR TIBIA FIBULA BILATERAL 2 VIEWS;  10/10/2024 11:15 am   INDICATION: Signs/Symptoms:right shin pain.   COMPARISON: None.   ACCESSION NUMBER(S): [id number]   ORDERING CLINICIAN: ALISSA CARL   FINDINGS: There may be mild depression along the right lateral tibial plateau. No additional fracture identified in either tibia or fibula. Osteopenia. Chondrocalcinosis of the knee suggests CPPD arthropathy.       Possible age-indeterminate right lateral tibial plateau fracture. No additional fracture identified in either tibia or fibula.   MACRO: None   Signed by: Joselo Mcdonald 10/10/2024 11:19 AM Dictation workstation:   [id number]    XR ankle right 3+ views    Result Date: 10/10/2024  Interpreted By:  Joselo Mcdonald, STUDY: XR ANKLE RIGHT 3+ VIEWS;  10/10/2024 10:28 am   INDICATION: Signs/Symptoms:Fall in shower.   COMPARISON: None.   ACCESSION NUMBER(S): [id number]   ORDERING CLINICIAN: SHEILA RAMIREZ   FINDINGS: Hindfoot partially obscured posteriorly on lateral view. No definite fracture or dislocation identified. Osteopenia. Mild soft tissue swelling.       No definite acute osseous injury of the right ankle.   MACRO: None   Signed by: Joselo Mcdonald 10/10/2024 10:57 AM Dictation workstation:    [id number]    XR knee right 1-2 views    Result Date: 10/10/2024  Interpreted By:  Joselo Mcdonald, STUDY: XR KNEE RIGHT 1-2 VIEWS;  10/10/2024 10:28 am   INDICATION: Signs/Symptoms:Fall in shower.   COMPARISON: None.   ACCESSION NUMBER(S): [id number]   ORDERING CLINICIAN: SHEILA RAMIREZ   FINDINGS: Possible depressed lateral tibial plateau fracture. Tiny patellar osteophytes.       Possible right lateral tibial plateau fracture.   MACRO: None   Signed by: Joselo Mcdonald 10/10/2024 10:56 AM Dictation workstation:   [id number]    XR femur right 2+ views    Result Date: 10/10/2024  Interpreted By:  Joselo Mcdonald, STUDY: XR FEMUR RIGHT 2+ VIEWS;  10/10/2024 10:28 am   INDICATION: Signs/Symptoms:Fall in shower.   COMPARISON: None.   ACCESSION NUMBER(S): [id number]   ORDERING CLINICIAN: SHEILA RAMIREZ   FINDINGS: No definite fracture identified.       Grossly intact right femur.   MACRO: None   Signed by: Joselo Mcdonald 10/10/2024 10:54 AM Dictation workstation:   [id number]    XR knee left 4+ views    Result Date: 10/8/2024  Interpreted By:  Waylon Nava, STUDY: XR KNEE LEFT 4+ VIEWS; ;  10/8/2024 4:43 pm   INDICATION: Signs/Symptoms:fall.     COMPARISON: 07/14/2014   ACCESSION NUMBER(S): [id number]   ORDERING CLINICIAN: CHAY PEREZ   FINDINGS: Left knee, four views   There is no fracture. There is no dislocation. There is mild ligamental osteophytosis. There is diffuse osteopenia. No effusion seen. There is no lytic or sclerotic lesion. There is no soft tissue abnormality seen.       Limited by diffuse osteopenia. Within this limitation no definite fracture seen. There is no effusion. If there is persistent concern CT can be performed for further evaluation     MACRO: None   Signed by: Waylon Nava 10/8/2024 5:08 PM Dictation workstation:   [id number]    XR ankle left 3+ views    Result Date: 10/8/2024  Interpreted By:  Waylon Nava, STUDY: XR ANKLE LEFT 3+ VIEWS; ;  10/8/2024 4:43 pm   INDICATION:  Signs/Symptoms:fall.     COMPARISON: None.   ACCESSION NUMBER(S): [id number]   ORDERING CLINICIAN: CHAY PEREZ   FINDINGS: Left ankle, three views   There is no fracture. There is no dislocation. There are no degenerative changes. There is no lytic or sclerotic lesion. There is no soft tissue abnormality seen.       No acute abnormality in the left ankle     MACRO: None   Signed by: Waylon Nava 10/8/2024 5:07 PM Dictation workstation:   [id number]    CT lumbar spine wo IV contrast    Result Date: 10/8/2024  Interpreted By:  Shefali Hi, STUDY: CT LUMBAR SPINE WO IV CONTRAST  10/8/2024 3:13 pm   INDICATION: Signs/Symptoms:fall   COMPARISON: CT lumbar spine 12/22/2022.   ACCESSION NUMBER(S): [id number]   ORDERING CLINICIAN: KAUSHAL KAYE   TECHNIQUE: Axial CT images of the lumbar spine are obtained. Axial, coronal and sagittal reconstructions are provided for review.   FINDINGS: Segmentation: Normal.   Hardware: None. Fracture: No acute fracture. Remote L3 superior endplate compression fracture deformity, not significantly changed compared to 12/22/2022. Vertebral Body Heights: Grossly maintained with the exception of L3. Alignment:  No traumatic malalignment. Unchanged grade 1 anterolisthesis L3 over L4 by approximately 9 mm. No new pars defects. Minimal levocurvature.   Intervertebral Disc Spaces: Grossly unchanged multilevel disc space narrowing, severe at L5-S1.   Degenerative change:   T12-L1:  There is no significant spinal canal stenosis or neural foraminal narrowing.   L1-2:  There is no significant spinal canal or neural foraminal narrowing.   L2-3:  There is no significant spinal canal or neural foraminal narrowing.   L3-4:  There is moderate spinal canal and bilateral moderate-to-severe neural foraminal narrowing.   L4-5:  There is mild significant spinal canal without significant neural foraminal narrowing.   L5-S1:  There is no significant spinal canal. There is severe left-sided  neural foraminal narrowing.   Prevertebral/Paraspinal Soft Tissues: No acute abnormality.       No acute fracture or traumatic malalignment in the lumbar spine.   Unchanged L3 compression fracture deformity dating back to 12/22/2022.   Signed by: Shefali Hi 10/8/2024 3:46 PM Dictation workstation:   [id number]    CT abdomen pelvis w IV contrast    Result Date: 10/8/2024  Interpreted By:  Shefali Hi, STUDY: CT ABDOMEN PELVIS W IV CONTRAST;  10/8/2024 3:13 pm   INDICATION: Signs/Symptoms:fall.   COMPARISON: CT chest, abdomen and pelvis 12/22/2022   ACCESSION NUMBER(S): [id number]   ORDERING CLINICIAN: KAUSHAL KAYE   TECHNIQUE: Axial CT of the abdomen and pelvis was performed following intravenous administration of 75 ml of contrast Omnipaque 350 with coronal and sagittal reconstruction.   FINDINGS: Lower chest: Bilateral dependent atelectasis.   Liver: No mass.   Biliary: No intrahepatic or extrahepatic bile duct dilation. Mildly distended gallbladder without gallbladder wall thickening or pericholecystic inflammation. Focal nodularity at the gallbladder fundus is redemonstrated likely adenomyomatosis or small polyp.   Spleen: No mass. No splenomegaly.   Pancreas: No mass or duct dilation.   Adrenals: Stable 2.8 cm left adrenal myelolipoma versus adenoma. Stable 2.4 cm right adrenal adenoma.   Kidneys: A few small left parapelvic cysts. No calculus or hydronephrosis.   GI tract: No bowel wall thickening or dilation. Normal appendix.   Lymph nodes: No lymphadenopathy.   Mesentery/peritoneum: No free fluid, free air or fluid collection.   Vasculature: Mild-to-moderate abdominal aortic atherosclerotic calcifications without aneurysmal dilation.   Pelvis: No free fluid, free air or fluid collection. Moderately distended bladder. Hysterectomy.   Bones/Soft tissues: Multiple remote healed bilateral anterior rib fractures.No acute fracture in the sacrum, pelvis or imaged proximal femora. Please refer to  separately reported lumbar spine CT for additional findings.       No acute abdominopelvic process.   Please refer to separately reported lumbar spine CT for additional findings.   MACRO: None   Signed by: Shefali Hi 10/8/2024 3:39 PM Dictation workstation:   [id number]    CT cervical spine wo IV contrast    Result Date: 10/8/2024  Interpreted By:  Shefali Hi, STUDY: CT CERVICAL SPINE WO IV CONTRAST;  10/8/2024 3:13 pm   INDICATION: Signs/Symptoms:fall.   COMPARISON: CT C-spine 12/22/2022.   ACCESSION NUMBER(S): [id number]   ORDERING CLINICIAN: KAUSHAL KAYE   TECHNIQUE: Thin section axial images were obtained from the skull base down through the thoracic inlet. Sagittal and coronal reconstruction images were generated. Soft tissue, lung, and bone windows were reviewed.   FINDINGS: Prevertebral/Paraspinal Soft Tissues: No acute abnormalities.   CERVICAL SPINE: Hardware: None. Fracture: No acute fracture. Vertebral Body Heights: Maintained. Alignment: No traumatic listhesis. Grossly unchanged mild reversal of cervical lordosis centered at C5 resulting from grade 1 anterolisthesis C4 over C5 and degenerative retrolisthesis C5 over C6. Spinal canal and neural foramina: Overall progressed multilevel degenerative disc disease, most pronounced with severe neural foraminal narrowing at C4-C5 on the right, C5-C6 bilaterally, C6-C7 bilaterally and moderate to severe canal stenosis at C5-C6.       No acute fracture or traumatic malalignment.   MACRO None   Signed by: Shefali Hi 10/8/2024 3:30 PM Dictation workstation:   [id number]    CT head wo IV contrast    Result Date: 10/8/2024  Interpreted By:  Shefali Hi, STUDY: CT HEAD WO IV CONTRAST;  10/8/2024 3:13 pm   INDICATION: Signs/Symptoms:fall.   COMPARISON: CT head 12/22/2022.   ACCESSION NUMBER(S): [id number]   ORDERING CLINICIAN: KAUSHAL KAYE   TECHNIQUE: Noncontrast axial CT scan of head was performed.   FINDINGS: Parenchyma: No intracranial  hemorrhage. The grey-white differentiation is intact. No mass effect or midline shift. Scattered periventricular white matter hypodensities, likely chronic microvascular ischemic change. Moderate parenchymal atrophy.   CSF Spaces: The ventricles, sulci and basal cisterns are proportional to degree of parenchymal volume loss.   Extra-Axial Fluid: None.   Calvarium: No acute depressed fracture.   Paranasal sinuses: Bilateral maxillary antrostomies. Bilateral mild maxillary mucosal thickening. Partial opacification left sphenoid sinus with an air-fluid level. Minimal opacification of ethmoid air cells.   Mastoids: Clear.   Orbits: No acute abnormality.   Soft tissues: No acute abnormality.       No acute intracranial hemorrhage or depressed calvarial fracture.   MACRO None   Signed by: Shefali Hi 10/8/2024 3:26 PM Dictation workstation:   [id number]    XR pelvis 1-2 views    Result Date: 10/8/2024  Interpreted By:  Evan Oliver, STUDY: XR PELVIS 1-2 VIEWS 10/8/2024 1:50 pm   INDICATION: Signs/Symptoms:fall injury   COMPARISON: 08/13/2020   ACCESSION NUMBER(S): [id number]   ORDERING CLINICIAN: CHAY PEREZ   TECHNIQUE: A single AP view of the pelvis was obtained.   FINDINGS: There is no evidence of acute fracture or dislocation identified. Mild-to-moderate hypertrophic degenerative changes are seen in the sacroiliac joints bilaterally. Minimal degenerative changes are seen in the hips. Rounded calcifications are seen with throughout the pelvis, most consistent with phleboliths.       1. No fracture or dislocation. 2. Degenerative changes, as described above.   MACRO: None.     Signed by: Evan Oliver 10/8/2024 2:07 PM Dictation workstation:   [id number]    XR shoulder right 2+ views    Result Date: 10/8/2024  Interpreted By:  Gold Mukherjee, STUDY: Right shoulder dated  10/8/2024.   INDICATION: Signs/Symptoms:fall   COMPARISON: None.   ACCESSION NUMBER(S): [id number]   ORDERING CLINICIAN: KUASHAL  MIKKI   TECHNIQUE: Two views of the right shoulder.   FINDINGS: Bones are demineralized. There is comminuted impacted displaced fracturing of the proximal humerus with dominant transverse fracture line through the surgical neck with comminuted extension into at least the greater tubercle of the humerus. The metadiaphysis is displaced centrally by a proximally 1.8 cm and anteriorly by possibly up to 1.4 cm. There is mild glenohumeral and acromioclavicular joint degenerative change. Degenerative changes seen of the spine. Reticular edema is seen in the soft tissues of the arm likely from contusion.       Comminuted impacted displaced fracturing of the proximal humerus.   MACRO: None   Signed by: Gold Mukherjee 10/8/2024 1:28 PM Dictation workstation:   [id number]    XR wrist right 3+ views    Result Date: 10/8/2024  Interpreted By:  Gold Mukherjee, STUDY: Right wrist dated  10/8/2024   INDICATION: Signs/Symptoms:fall   COMPARISON: Radiographs dated 12/22/2022   ACCESSION NUMBER(S): [id number]   ORDERING CLINICIAN: KAUSHAL KAYE   TECHNIQUE: Four views of the right wrist.   FINDINGS: Bones are demineralized. No fracture or dislocation is evident. There is moderate to severe 1st digit carpometacarpal joint degenerative change. Mild degenerative changes seen of the triscaphe joint. Calcium pyrophosphate dihydrate deposition is seen in the region of the lunotriquetral joint. No soft tissue gas or radiopaque foreign body is evident.       No osseous injury is evident in this patient with demineralized bones.   MACRO: None   Signed by: Gold Mukherjee 10/8/2024 1:27 PM Dictation workstation:   [id number]                  Assessment/Plan   Assessment & Plan  Fall in shower    Right prox humerus fracture  -Dr. Garcia spoke with  via telephone about treatment options, opted for conservative treatment  -NWB right UE  -sling right UE     2. Bilateral LE pain, difficult to discern source of pain as patient is A&O X  0  Xray imaging reveals right tibial plateau fracture   CT right LE to better characterize right tibial plateau fracture, is minimally displaced  Imaging reviewed by Dr. Davin Garcia  No surgical intervention indicated  Right knee immobilizer  NWB right LE    Dr. Garcia discussed ortho plan with     Pt should follow up with Dr. Garcia on 10/17 with images of right humerus and knee    Thank you for allowing us to participate in this patient's care  Okay for discharge from orthopedic standpoint when medically stable, please reach out for any further questions or concerns            I spent 25 minutes in the professional and overall care of this patient.      Radha Gleason PA-C

## 2024-10-10 NOTE — PROGRESS NOTES
10/10/24 1106   Discharge Planning   Living Arrangements Spouse/significant other   Support Systems Home care staff;Family members   Assistance Needed yes   Expected Discharge Disposition SNF   Does the patient need discharge transport arranged? Yes   RoundTrip coordination needed? Yes   Has discharge transport been arranged? No     Patient is medically ready for discharge, message to me was family was willing to private pay for SNF care. Caregiver at the bedside states family preference is [facility]. Voice mail left for son to confirm [facility]. Message sent dsc to send a referral to facility.     1600: the caregiver at the bedside is upset regarding her recent need for a knee immobilizer due to results of a recent ct scan. I gave her the hospital/patient advocate information to voice the family's concerns.     1620: The caregiver at the bedside asked us to arrange for transport to [facility] this pm. I contacted the  and  verified his consent to arrange transport to [facility]. He gave me his verbal consent to arrange transport tonight, and wants her to leave this facility pm and not to hold the transfer if it was late. Confirmed with the liaison the facility can accept tonight. Arrangement for the financial made privately with the family. Message sent to dsc to submit the passr.

## 2024-10-10 NOTE — NURSING NOTE
Pt slept well through the night.  No acute events overnight.  Pt resistant to be turned every 2 hours secondary to arm pain.  RN communicated with MD about family/caregiver request for Xrays to [facility].  Pt is c/o discomfort when leg is touched.  Pt will have Xrays this am.  Call light within reach.  Bed alarm on.

## 2024-10-11 VITALS
WEIGHT: 142.64 LBS | HEIGHT: 65 IN | SYSTOLIC BLOOD PRESSURE: 125 MMHG | OXYGEN SATURATION: 94 % | HEART RATE: 104 BPM | TEMPERATURE: 98.2 F | BODY MASS INDEX: 23.76 KG/M2 | DIASTOLIC BLOOD PRESSURE: 57 MMHG | RESPIRATION RATE: 16 BRPM

## 2024-10-11 DIAGNOSIS — N30.00 ACUTE CYSTITIS WITHOUT HEMATURIA: Primary | ICD-10-CM

## 2024-10-11 LAB — BACTERIA UR CULT: ABNORMAL

## 2024-10-11 PROCEDURE — G0378 HOSPITAL OBSERVATION PER HR: HCPCS

## 2024-10-11 RX ORDER — CEFDINIR 300 MG/1
300 CAPSULE ORAL 2 TIMES DAILY
Qty: 14 CAPSULE | Refills: 0 | Status: SHIPPED | OUTPATIENT
Start: 2024-10-11 | End: 2024-10-18

## 2024-10-11 NOTE — NURSING NOTE
Patient set to transfer to [facility] SNF this evening. Report called to Rivera at facility.

## 2024-10-11 NOTE — CARE PLAN
Problem: Skin  Goal: Decreased wound size/increased tissue granulation at next dressing change  Outcome: Progressing  Flowsheets (Taken 10/10/2024 0632 by Robby Klein, RN)  Decreased wound size/increased tissue granulation at next dressing change: Promote sleep for wound healing  Goal: Participates in plan/prevention/treatment measures  Outcome: Progressing  Flowsheets (Taken 10/10/2024 0632 by Robby Klein, RN)  Participates in plan/prevention/treatment measures: Increase activity/out of bed for meals  Goal: Prevent/manage excess moisture  Outcome: Progressing  Flowsheets (Taken 10/10/2024 0632 by Robby Klein, RN)  Prevent/manage excess moisture: Cleanse incontinence/protect with barrier cream  Goal: Prevent/minimize sheer/friction injuries  Outcome: Progressing  Flowsheets (Taken 10/10/2024 0632 by Robby Klein, RN)  Prevent/minimize sheer/friction injuries:   HOB 30 degrees or less   Increase activity/out of bed for meals   Turn/reposition every 2 hours/use positioning/transfer devices  Goal: Promote/optimize nutrition  Outcome: Progressing  Flowsheets (Taken 10/10/2024 1833 by Jaqueline Estrada RN)  Promote/optimize nutrition: Assist with feeding  Goal: Promote skin healing  Outcome: Progressing  Flowsheets (Taken 10/10/2024 0632 by Robby Klein, RN)  Promote skin healing: Turn/reposition every 2 hours/use positioning/transfer devices     Problem: Pain  Goal: Takes deep breaths with improved pain control throughout the shift  Outcome: Progressing  Goal: Turns in bed with improved pain control throughout the shift  Outcome: Progressing  Goal: Walks with improved pain control throughout the shift  Outcome: Progressing  Goal: Performs ADL's with improved pain control throughout shift  Outcome: Progressing  Goal: Participates in PT with improved pain control throughout the shift  Outcome: Progressing     Problem: Pain - Adult  Goal: Verbalizes/displays adequate comfort level or  baseline comfort level  Outcome: Progressing     Problem: Diabetes  Goal: Achieve decreasing blood glucose levels by end of shift  Outcome: Progressing  Goal: Increase stability of blood glucose readings by end of shift  Outcome: Progressing  Goal: Decrease in ketones present in urine by end of shift  Outcome: Progressing  Goal: Maintain electrolyte levels within acceptable range throughout shift  Outcome: Progressing  Goal: Maintain glucose levels >70mg/dl to <250mg/dl throughout shift  Outcome: Progressing  Goal: No changes in neurological exam by end of shift  Outcome: Progressing  Goal: Learn about and adhere to nutrition recommendations by end of shift  Outcome: Progressing  Goal: Vital signs within normal range for age by end of shift  Outcome: Progressing  Goal: Increase self care and/or family involovement by end of shift  Outcome: Progressing  Goal: Receive DSME education by end of shift  Outcome: Progressing   The patient's goals for the shift include no falls/ able to rest    The clinical goals for the shift include Patient will remain free from injury through end of shift.

## 2024-10-11 NOTE — PROGRESS NOTES
Patient's urine culture is positive.  Based on the culture and sensitivity result we will send antibiotics to home diabetes, and a mild leg and call the caregiver Alida Biggs at [phone] and updated her with a voicemail.

## 2024-10-15 ENCOUNTER — NURSING HOME VISIT (OUTPATIENT)
Dept: POST ACUTE CARE | Facility: EXTERNAL LOCATION | Age: 74
End: 2024-10-15
Payer: MEDICARE

## 2024-10-15 DIAGNOSIS — N30.01 ACUTE CYSTITIS WITH HEMATURIA: ICD-10-CM

## 2024-10-15 DIAGNOSIS — F02.B2 MODERATE LATE ONSET ALZHEIMER'S DEMENTIA WITH PSYCHOTIC DISTURBANCE (MULTI): ICD-10-CM

## 2024-10-15 DIAGNOSIS — I10 HYPERTENSION, ESSENTIAL: ICD-10-CM

## 2024-10-15 DIAGNOSIS — S82.141D CLOSED FRACTURE OF RIGHT TIBIAL PLATEAU WITH ROUTINE HEALING, SUBSEQUENT ENCOUNTER: ICD-10-CM

## 2024-10-15 DIAGNOSIS — M15.9 OSTEOARTHRITIS OF MULTIPLE JOINTS, UNSPECIFIED OSTEOARTHRITIS TYPE: ICD-10-CM

## 2024-10-15 DIAGNOSIS — S42.291D OTHER CLOSED DISPLACED FRACTURE OF PROXIMAL END OF RIGHT HUMERUS WITH ROUTINE HEALING, SUBSEQUENT ENCOUNTER: Primary | ICD-10-CM

## 2024-10-15 DIAGNOSIS — E78.5 HYPERLIPIDEMIA, UNSPECIFIED HYPERLIPIDEMIA TYPE: ICD-10-CM

## 2024-10-15 DIAGNOSIS — D62 ANEMIA DUE TO BLOOD LOSS, ACUTE: ICD-10-CM

## 2024-10-15 DIAGNOSIS — E11.65 TYPE 2 DIABETES MELLITUS WITH HYPERGLYCEMIA, WITHOUT LONG-TERM CURRENT USE OF INSULIN: ICD-10-CM

## 2024-10-15 DIAGNOSIS — G30.1 MODERATE LATE ONSET ALZHEIMER'S DEMENTIA WITH PSYCHOTIC DISTURBANCE (MULTI): ICD-10-CM

## 2024-10-15 PROBLEM — S42.201D CLOSED FRACTURE OF PROXIMAL END OF RIGHT HUMERUS WITH ROUTINE HEALING: Status: ACTIVE | Noted: 2024-10-15

## 2024-10-15 PROCEDURE — 99306 1ST NF CARE HIGH MDM 50: CPT | Performed by: FAMILY MEDICINE

## 2024-10-15 NOTE — LETTER
Patient: Raissa Puentes  : 1950    Encounter Date: 10/15/2024    Documentation at [facility]    Problem List Items Addressed This Visit       Hypertension, essential    Hyperlipidemia    Closed fracture of proximal end of right humerus with routine healing - Primary    Acute cystitis with hematuria    Anemia due to blood loss, acute    Moderate late onset Alzheimer's dementia with psychotic disturbance (Multi)    Type 2 diabetes mellitus with hyperglycemia, without long-term current use of insulin    Osteoarthritis of multiple joints     Other Visit Diagnoses       Closed fracture of right tibial plateau with routine healing, subsequent encounter              Spoke with , Kenan:  states he is DPOA.  Phone not listed on Admission Record  - phone: [phone]    Dementia with behaviors - at least moderate:  incontinent of urine.  Family providing PureWick device but not confident NH is using it.      Fracture right arm and right leg.  Non-surgical.  Anticipating 6-8 wk recovery, probably worsening of dementia, sarcopenia, difficulty with rehab due to dementia (worsening) and sarcopenia.  - likely poor prognosis for returning home, but  indicates he wants her to return home    UTI:  patient came to NH with order for unusual ampicillin order (once per week).  Refer to nurse documentation regarding family bring a different ATB (Omnicef) because hospital physician allegedly called family stating patient had a UTI and a different ATB was needed.  However when the urine C/S from 10/9/24 was reviewed on Epic, Omnicef is not one of the ATB tested for susceptibility.    Note - no medications are linked to a diagnosis in medication orders from hospitals      Electronically Signed By: Kenan De Los Santos DO   10/15/24  9:34 PM

## 2024-10-16 NOTE — PROGRESS NOTES
Documentation at [facility]    Problem List Items Addressed This Visit       Hypertension, essential    Hyperlipidemia    Closed fracture of proximal end of right humerus with routine healing - Primary    Acute cystitis with hematuria    Anemia due to blood loss, acute    Moderate late onset Alzheimer's dementia with psychotic disturbance (Multi)    Type 2 diabetes mellitus with hyperglycemia, without long-term current use of insulin    Osteoarthritis of multiple joints     Other Visit Diagnoses       Closed fracture of right tibial plateau with routine healing, subsequent encounter              Spoke with , Kenan:  states he is DPOA.  Phone not listed on Admission Record  - phone: [phone]    Dementia with behaviors - at least moderate:  incontinent of urine.  Family providing PureWick device but not confident NH is using it.      Fracture right arm and right leg.  Non-surgical.  Anticipating 6-8 wk recovery, probably worsening of dementia, sarcopenia, difficulty with rehab due to dementia (worsening) and sarcopenia.  - likely poor prognosis for returning home, but  indicates he wants her to return home    UTI:  patient came to NH with order for unusual ampicillin order (once per week).  Refer to nurse documentation regarding family bring a different ATB (Omnicef) because hospital physician allegedly called family stating patient had a UTI and a different ATB was needed.  However when the urine C/S from 10/9/24 was reviewed on Epic, Omnicef is not one of the ATB tested for susceptibility.    Note - no medications are linked to a diagnosis in medication orders from hospitals

## 2024-10-24 ENCOUNTER — HOSPITAL ENCOUNTER (OUTPATIENT)
Dept: RADIOLOGY | Facility: CLINIC | Age: 74
Discharge: HOME | End: 2024-10-24
Payer: MEDICARE

## 2024-10-24 ENCOUNTER — OFFICE VISIT (OUTPATIENT)
Dept: ORTHOPEDIC SURGERY | Facility: CLINIC | Age: 74
End: 2024-10-24
Payer: MEDICARE

## 2024-10-24 DIAGNOSIS — S82.141D CLOSED FRACTURE OF RIGHT TIBIAL PLATEAU WITH ROUTINE HEALING: Primary | ICD-10-CM

## 2024-10-24 DIAGNOSIS — M25.511 RIGHT SHOULDER PAIN, UNSPECIFIED CHRONICITY: ICD-10-CM

## 2024-10-24 DIAGNOSIS — M25.561 RIGHT KNEE PAIN, UNSPECIFIED CHRONICITY: ICD-10-CM

## 2024-10-24 DIAGNOSIS — S42.271D CLOSED TORUS FRACTURE OF PROXIMAL END OF RIGHT HUMERUS WITH ROUTINE HEALING, SUBSEQUENT ENCOUNTER: ICD-10-CM

## 2024-10-24 PROCEDURE — 27530 TREAT KNEE FRACTURE: CPT | Performed by: ORTHOPAEDIC SURGERY

## 2024-10-24 PROCEDURE — 99214 OFFICE O/P EST MOD 30 MIN: CPT | Performed by: ORTHOPAEDIC SURGERY

## 2024-10-24 PROCEDURE — 23600 CLTX PROX HUMRL FX W/O MNPJ: CPT | Performed by: ORTHOPAEDIC SURGERY

## 2024-10-24 PROCEDURE — 99213 OFFICE O/P EST LOW 20 MIN: CPT | Mod: 25 | Performed by: ORTHOPAEDIC SURGERY

## 2024-10-24 PROCEDURE — 73560 X-RAY EXAM OF KNEE 1 OR 2: CPT | Mod: RT

## 2024-10-24 PROCEDURE — 73030 X-RAY EXAM OF SHOULDER: CPT | Mod: RT

## 2024-10-24 PROCEDURE — 1123F ACP DISCUSS/DSCN MKR DOCD: CPT | Performed by: ORTHOPAEDIC SURGERY

## 2024-10-24 NOTE — PROGRESS NOTES
History of present illness: Patient with a history of traumatic fall sustained a right proximal humerus fracture and a right lateral tibial plateau fracture on 10/8/2024    The patient is been in a sling right upper extremity    Knee immobilizer right lower extremity nonweightbearing on both upper and lower extremity limbs    Physical exam:    General: No acute distress or breathing difficulty or discomfort, pleasant and cooperative with the examination.    Extremities: Right knee exam    Effusion ecchymosis bruising is going down    She can gently straight leg raise and flex down to 2030 degrees without pain    She can move toes foot and ankle    There is does not appear to be any hip pain with logrolling or gentle internal/external rotation or while sitting in the wheelchair    Right shoulder exam    Ecchymosis bruising swelling going down moving elbow hand and wrist    Motor intact C5-T1    Obvious stiffness and limited motion at the shoulder    No obvious dislocation or deformity        Diagnostic studies: X-rays show an impacted healing but minimally displaced proximal humerus fracture    2 view x-rays right knee shows underlying arthritis but a healing sclerotic lateral tibial plateau fracture        Impression: Healing right lateral tibial plateau fracture    Healing proximal humerus impacted fracture        Plan: 50% weightbearing right lower extremity while in a simple knee brace    The brace may be off her bath shower eating and sleeping    There is no limit to how much range of motion she can attempt and pursue    Right upper extremity remains nonweightbearing in a sling when out of bed    The patient may do elbow wrist active range of motion, passive range of motion pendulums only the right shoulder may begin    For comfort measures the sling may be removed while she is in bed or in a chair but should be on at all times when ambulating and strict nonweightbearing of the right upper extremity remains till  follow-up x-rays are    We look forward to seeing the patient back in approximately 3 weeks for AP axillary right shoulder x-ray and for AP lateral right knee x-ray    If things go well at that time we would then return her to full weightbearing right lower extremity and weightbearing as tolerated right upper extremity we look forward to seeing her back for x-rays

## 2024-10-28 ENCOUNTER — PATIENT OUTREACH (OUTPATIENT)
Dept: PRIMARY CARE | Facility: CLINIC | Age: 74
End: 2024-10-28

## 2024-10-28 ENCOUNTER — APPOINTMENT (OUTPATIENT)
Dept: UROLOGY | Facility: CLINIC | Age: 74
End: 2024-10-28
Payer: MEDICARE

## 2024-10-28 DIAGNOSIS — E11.9 TYPE 2 DIABETES MELLITUS WITHOUT COMPLICATION, WITHOUT LONG-TERM CURRENT USE OF INSULIN (MULTI): ICD-10-CM

## 2024-10-28 DIAGNOSIS — I10 PRIMARY HYPERTENSION: ICD-10-CM

## 2024-10-28 PROCEDURE — 99490 CHRNC CARE MGMT STAFF 1ST 20: CPT | Performed by: FAMILY MEDICINE

## 2024-11-06 ENCOUNTER — APPOINTMENT (OUTPATIENT)
Dept: PRIMARY CARE | Facility: CLINIC | Age: 74
End: 2024-11-06
Payer: MEDICARE

## 2024-11-14 ENCOUNTER — APPOINTMENT (OUTPATIENT)
Dept: ORTHOPEDIC SURGERY | Facility: CLINIC | Age: 74
End: 2024-11-14
Payer: MEDICARE

## 2024-11-15 ENCOUNTER — APPOINTMENT (OUTPATIENT)
Dept: INFECTIOUS DISEASES | Facility: CLINIC | Age: 74
End: 2024-11-15
Payer: MEDICARE

## 2024-11-18 ENCOUNTER — HOSPITAL ENCOUNTER (OUTPATIENT)
Dept: RADIOLOGY | Facility: HOSPITAL | Age: 74
Discharge: HOME | End: 2024-11-18
Payer: MEDICARE

## 2024-11-18 ENCOUNTER — APPOINTMENT (OUTPATIENT)
Dept: ORTHOPEDIC SURGERY | Facility: CLINIC | Age: 74
End: 2024-11-18
Payer: MEDICARE

## 2024-11-18 DIAGNOSIS — S82.141D CLOSED FRACTURE OF RIGHT TIBIAL PLATEAU WITH ROUTINE HEALING: ICD-10-CM

## 2024-11-18 DIAGNOSIS — M25.561 RIGHT KNEE PAIN, UNSPECIFIED CHRONICITY: ICD-10-CM

## 2024-11-18 DIAGNOSIS — S42.271D CLOSED TORUS FRACTURE OF PROXIMAL END OF RIGHT HUMERUS WITH ROUTINE HEALING, SUBSEQUENT ENCOUNTER: Primary | ICD-10-CM

## 2024-11-18 DIAGNOSIS — M25.511 RIGHT SHOULDER PAIN, UNSPECIFIED CHRONICITY: ICD-10-CM

## 2024-11-18 PROCEDURE — 73030 X-RAY EXAM OF SHOULDER: CPT | Mod: RIGHT SIDE | Performed by: RADIOLOGY

## 2024-11-18 PROCEDURE — 73560 X-RAY EXAM OF KNEE 1 OR 2: CPT | Mod: RIGHT SIDE | Performed by: RADIOLOGY

## 2024-11-18 PROCEDURE — 1123F ACP DISCUSS/DSCN MKR DOCD: CPT | Performed by: ORTHOPAEDIC SURGERY

## 2024-11-18 PROCEDURE — 73030 X-RAY EXAM OF SHOULDER: CPT | Mod: RT

## 2024-11-18 PROCEDURE — 73560 X-RAY EXAM OF KNEE 1 OR 2: CPT | Mod: RT

## 2024-11-18 PROCEDURE — 99024 POSTOP FOLLOW-UP VISIT: CPT | Performed by: ORTHOPAEDIC SURGERY

## 2024-11-18 NOTE — PROGRESS NOTES
History of present illness patient is just over 1 month follow-up right proximal humerus fracture and right tibial plateau fracture.  She is currently at [facility] however has not been able to do much rehabilitation due to to her nonweightbearing status right upper extremity and only 50% weightbearing status right lower extremity.      Physical exam:      General: No acute distress or breathing difficulty or discomfort, pleasant and cooperative with the examination.    Extremities: Right arm patient is currently in sling she does not have pain she can perform hand wrist and elbow range of motion without difficulty.  Right knee patient is currently wearing a knee sleeve brace.  Slight valgus deformity but can perform forward flexion and extension      Diagnostic studies: X-rays 2 views right shoulder show out to length proximal humerus fracture with good callus formation.  X-rays 2 views right knee show stable lateral tibial plateau fracture with some valgus deformity from impaction    Impression: Right proximal humerus fracture and right tibial plateau fracture    Plan: Patient has had significant difficulty doing any physical therapy due to dementia, sling and weightbearing status.  She needs constant assistance and we recommend acute rehabilitation facility as a two-person assist.  She is now weight-bear as tolerated right lower extremity.  She can discontinue her sling and start active passive range of motion of right upper extremity 5 pounds max resistance until December 1, 2024.  She may then advance to full weightbearing status using a rollator with her right upper extremity.  She will follow-up in 1 month with x-rays 2 views of the right shoulder and 2 views of the right knee.  We will discuss lateral  brace at that time.

## 2024-11-25 ENCOUNTER — APPOINTMENT (OUTPATIENT)
Dept: ORTHOPEDIC SURGERY | Facility: CLINIC | Age: 74
End: 2024-11-25
Payer: MEDICARE

## 2024-11-25 ENCOUNTER — PATIENT OUTREACH (OUTPATIENT)
Dept: PRIMARY CARE | Facility: CLINIC | Age: 74
End: 2024-11-25

## 2024-11-25 DIAGNOSIS — E11.9 TYPE 2 DIABETES MELLITUS WITHOUT COMPLICATION, WITHOUT LONG-TERM CURRENT USE OF INSULIN (MULTI): ICD-10-CM

## 2024-11-25 DIAGNOSIS — I10 PRIMARY HYPERTENSION: ICD-10-CM

## 2024-11-25 PROCEDURE — 99490 CHRNC CARE MGMT STAFF 1ST 20: CPT | Performed by: FAMILY MEDICINE

## 2024-11-25 NOTE — PROGRESS NOTES
Still at [facility]  Will likely stay another 2-3 weeks  Recovering well  Just took sling off arm last week  Will get another 2 weeks of PT    No other concerns

## 2024-12-16 ENCOUNTER — HOSPITAL ENCOUNTER (OUTPATIENT)
Dept: RADIOLOGY | Facility: HOSPITAL | Age: 74
Discharge: HOME | End: 2024-12-16
Payer: MEDICARE

## 2024-12-16 ENCOUNTER — PATIENT OUTREACH (OUTPATIENT)
Dept: PRIMARY CARE | Facility: CLINIC | Age: 74
End: 2024-12-16

## 2024-12-16 ENCOUNTER — APPOINTMENT (OUTPATIENT)
Dept: ORTHOPEDIC SURGERY | Facility: CLINIC | Age: 74
End: 2024-12-16
Payer: MEDICARE

## 2024-12-16 DIAGNOSIS — F02.80 EARLY ONSET ALZHEIMER'S DEMENTIA WITHOUT BEHAVIORAL DISTURBANCE (MULTI): ICD-10-CM

## 2024-12-16 DIAGNOSIS — M25.511 RIGHT SHOULDER PAIN, UNSPECIFIED CHRONICITY: ICD-10-CM

## 2024-12-16 DIAGNOSIS — E11.9 TYPE 2 DIABETES MELLITUS WITHOUT COMPLICATION, WITHOUT LONG-TERM CURRENT USE OF INSULIN (MULTI): ICD-10-CM

## 2024-12-16 DIAGNOSIS — M25.561 RIGHT KNEE PAIN, UNSPECIFIED CHRONICITY: ICD-10-CM

## 2024-12-16 DIAGNOSIS — M17.11 PRIMARY OSTEOARTHRITIS OF RIGHT KNEE: ICD-10-CM

## 2024-12-16 DIAGNOSIS — G30.0 EARLY ONSET ALZHEIMER'S DEMENTIA WITHOUT BEHAVIORAL DISTURBANCE (MULTI): ICD-10-CM

## 2024-12-16 PROCEDURE — 73560 X-RAY EXAM OF KNEE 1 OR 2: CPT | Mod: RIGHT SIDE | Performed by: RADIOLOGY

## 2024-12-16 PROCEDURE — 99024 POSTOP FOLLOW-UP VISIT: CPT | Performed by: ORTHOPAEDIC SURGERY

## 2024-12-16 PROCEDURE — 1123F ACP DISCUSS/DSCN MKR DOCD: CPT | Performed by: ORTHOPAEDIC SURGERY

## 2024-12-16 PROCEDURE — 73030 X-RAY EXAM OF SHOULDER: CPT | Mod: RIGHT SIDE | Performed by: RADIOLOGY

## 2024-12-16 PROCEDURE — 73030 X-RAY EXAM OF SHOULDER: CPT | Mod: RT

## 2024-12-16 PROCEDURE — 73560 X-RAY EXAM OF KNEE 1 OR 2: CPT | Mod: RT

## 2024-12-16 NOTE — PROGRESS NOTES
Received call from Kenan Goel will be discharged on Friday  Will need 2 refills:   Weekly antibiotic and seroquel  Will format to pharmacy    Kenan is aware I will reach out next Monday after discharge

## 2024-12-16 NOTE — PROGRESS NOTES
History of present illness patient is over 2 months status post right shoulder proximal humerus fracture nonsurgical and right tibial plateau fracture nonsurgical.  She has been convalescing at a nursing facility.  She has been able to go weight-bear as tolerated.  She has no complaints of pain.      Physical exam:      General: No acute distress or breathing difficulty or discomfort, pleasant and cooperative with the examination.    Extremities: Right shoulder she is neurovascularly intact can move hand wrist and elbow.  Right knee valgus current range of motion is 0 to 110 degrees      Diagnostic studies: X-rays 2 views right shoulder show reasonably well aligned healing proximal humerus fracture.  X-rays 2 views right knee show healing tibial plateau fracture with some lateral impaction    Impression: Status post right proximal humerus fracture and right tibial plateau fracture nonsurgical healing well    Plan: Patient is measured today for a right knee lateral  brace.  She is full weight-bear as tolerated.  She can bear weight through her upper extremity as well.  No restrictions just gentle range of motion.

## 2024-12-17 ENCOUNTER — TELEPHONE (OUTPATIENT)
Dept: ORTHOPEDIC SURGERY | Facility: CLINIC | Age: 74
End: 2024-12-17
Payer: MEDICARE

## 2024-12-17 ENCOUNTER — TELEPHONE (OUTPATIENT)
Dept: PRIMARY CARE | Facility: CLINIC | Age: 74
End: 2024-12-17

## 2024-12-17 DIAGNOSIS — N39.0 CHRONIC UTI: ICD-10-CM

## 2024-12-17 DIAGNOSIS — F51.04 PSYCHOPHYSIOLOGICAL INSOMNIA: ICD-10-CM

## 2024-12-17 DIAGNOSIS — R45.4 IRRITABILITY: ICD-10-CM

## 2024-12-17 RX ORDER — QUETIAPINE FUMARATE 25 MG/1
TABLET, FILM COATED ORAL
Qty: 60 TABLET | Refills: 2 | Status: SHIPPED | OUTPATIENT
Start: 2024-12-17

## 2024-12-17 RX ORDER — AMPICILLIN 500 MG/1
CAPSULE ORAL
Qty: 21 CAPSULE | Refills: 2 | Status: SHIPPED | OUTPATIENT
Start: 2024-12-17

## 2024-12-17 NOTE — TELEPHONE ENCOUNTER
12/17/24 -Medial OA freestyle brace - All paperwork faxed to Rylan c[facility]shaw same day due to Medicare guidelines.

## 2024-12-18 ENCOUNTER — TELEPHONE (OUTPATIENT)
Dept: ORTHOPEDIC SURGERY | Facility: CLINIC | Age: 74
End: 2024-12-18
Payer: MEDICARE

## 2024-12-18 NOTE — TELEPHONE ENCOUNTER
12/17/24 - correction to the previous 12/17/24 note - this is NOT a medial OA brace as previously thought.   It is a lateral.   All paperwork has been sent to [facility] se[facility]/almaz Andrea

## 2024-12-19 ENCOUNTER — TELEPHONE (OUTPATIENT)
Dept: PRIMARY CARE | Facility: CLINIC | Age: 74
End: 2024-12-19
Payer: MEDICARE

## 2024-12-19 NOTE — TELEPHONE ENCOUNTER
Airam Farias from [facility] calling to verify that [facility] will follow pt for Home Health. Confirmed that [facility] will follow.

## 2024-12-23 ENCOUNTER — PATIENT OUTREACH (OUTPATIENT)
Dept: PRIMARY CARE | Facility: CLINIC | Age: 74
End: 2024-12-23
Payer: MEDICARE

## 2024-12-23 NOTE — PROGRESS NOTES
Patient Timeline:  10/8 Admit to [facility]  10/10/24 Transfer to [facility]  Discharge Diagnosis: Fall, Fracture of Right Proximal Humerus    PCP Appointment Date: 1/24/25  Specialist Appointment Date: 5/22/25 Dr. Vigil/Ortho  Hospital Encounter and Summary: Linked   See Discharge Assessment below for further details.    Hospital Course  74-year-old female with past medical history of Alzheimer's dementia, non-insulin-dependent diabetes mellitus, hyperlipidemia, hypertension presented after mechanical fall injuring the right proximal humerus fracture.  Patient was admitted and had a sling placed.  Orthopedic surgery was consulted and the recommended nonoperative management by way of nonweightbearing and sling immobilization for comfort.  Patient was also complaining about right leg and knee pain.  Patient had imaging done showing fracture of tibial plateau of indeterminate age.  CT scan was performed showing acute fracture which would explain why she is unable to stand.  Patient will be nonweightbearing on right lower extremity with a knee immobilizer.  She will need very close 2-week follow-up with Dr. Davin Garcia.   unfortunately given patient's current state she cannot go home.  And will be discharged to rehab.  She did not meet inpatient criteria on admission and we explained to family multiple times regarding the observation admission and tried to answer their questions as best as possible.       Medications  Medications reviewed with patient/caregiver?: Yes (12/23/2024 12:38 PM)  Is the patient having any side effects they believe may be caused by any medication additions or changes?: No (12/23/2024 12:38 PM)  Does the patient have all medications ordered at discharge?: Yes (12/23/2024 12:38 PM)  Care Management Interventions: No intervention needed (12/23/2024 12:38 PM)  Medication Comments: will be due for refill of Ozempic. Will find out best pharmacy to send prescription to. (12/23/2024 12:38  PM)    Appointments  Does the patient have a primary care provider?: Yes (12/23/2024 12:38 PM)  Care Management Interventions: Advised patient to make appointment (12/23/2024 12:38 PM)    Self Management  What is the home health agency?: [facility] (12/23/2024 12:38 PM)  Has home health visited the patient within 72 hours of discharge?: Yes (12/23/2024 12:38 PM)    Patient Teaching  Does the patient have access to their discharge instructions?: Yes (12/23/2024 12:38 PM)  Care Management Interventions: Reviewed instructions with patient (12/23/2024 12:38 PM)  What is the patient's perception of their health status since discharge?: Improving (12/23/2024 12:38 PM)  Patient/Caregiver Education Comments: Spoke with Kenan and caregiver Coreen. Amando is settling in at home. No signs of pain or agitation. (12/23/2024 12:38 PM)

## 2024-12-23 NOTE — TELEPHONE ENCOUNTER
Dr. Alegria patient    Eliza, a home health care nurse is calling to report that they did a start of care on 12/21. Says that there are some discrepancies with her current medication list, but nothing too concerning. Patient has an upcoming visit with [facility] on 1/24.    Please advise, thank you.     Call back number is [phone]

## 2024-12-24 ENCOUNTER — PATIENT OUTREACH (OUTPATIENT)
Dept: PRIMARY CARE | Facility: CLINIC | Age: 74
End: 2024-12-24
Payer: MEDICARE

## 2024-12-24 DIAGNOSIS — N39.0 RECURRENT UTI: ICD-10-CM

## 2024-12-24 DIAGNOSIS — G30.0 EARLY ONSET ALZHEIMER'S DEMENTIA WITHOUT BEHAVIORAL DISTURBANCE (MULTI): ICD-10-CM

## 2024-12-24 DIAGNOSIS — F02.80 EARLY ONSET ALZHEIMER'S DEMENTIA WITHOUT BEHAVIORAL DISTURBANCE (MULTI): ICD-10-CM

## 2024-12-24 NOTE — PROGRESS NOTES
Received phone call from Kenan  He and caregiver suspect she has another UTI  Having frequent episodes of incontinence  Urinating through brief  Urine in Purewick this morning was very dark with foul smelling    Melissa Joseph has verbal order for straight cath  And will come within the hour  Kenan and caregiver Madison are very concerned   And requesting antibiotic

## 2024-12-24 NOTE — PROGRESS NOTES
Spoke with caregiver Madison  Let her know Dr. Alegria prefers to wait for culture results before prescribing antibiotics  She verbalized understanding

## 2024-12-24 NOTE — TELEPHONE ENCOUNTER
[phone] Melissa Torres, home care nurse, is calling to get a verbal from [facility] to administer a straight catheter for pt.  Pt is showing signs of UTI and it is difficult for pt to leave a sample. Wondering if this would be ok. Please call to inform.

## 2024-12-26 DIAGNOSIS — N76.0 ACUTE VAGINITIS: ICD-10-CM

## 2024-12-26 DIAGNOSIS — E11.9 TYPE 2 DIABETES MELLITUS WITHOUT COMPLICATION, WITHOUT LONG-TERM CURRENT USE OF INSULIN (MULTI): ICD-10-CM

## 2024-12-26 DIAGNOSIS — N89.8 VAGINAL ITCHING: ICD-10-CM

## 2024-12-26 DIAGNOSIS — N89.8 VAGINAL DRYNESS: ICD-10-CM

## 2024-12-26 DIAGNOSIS — N30.01 ACUTE CYSTITIS WITH HEMATURIA: Primary | ICD-10-CM

## 2024-12-26 RX ORDER — NITROFURANTOIN 25; 75 MG/1; MG/1
100 CAPSULE ORAL 2 TIMES DAILY
Qty: 14 CAPSULE | Refills: 0 | Status: SHIPPED | OUTPATIENT
Start: 2024-12-26 | End: 2025-01-02

## 2024-12-26 NOTE — TELEPHONE ENCOUNTER
Dr Alegria pt    Madison care giver called and stated pt urine was taken to [facility] and was only dipped and not sent to be cultured. Pt care giver is upset and wants to know why it was not cultured. Please advise     [phone]

## 2024-12-26 NOTE — TELEPHONE ENCOUNTER
Pt's caregiver called and said her UA came back as positive and she would like [facility] to send over an antibiotic to [facility] in [facility].

## 2024-12-27 RX ORDER — METRONIDAZOLE 7.5 MG/G
GEL VAGINAL 2 TIMES DAILY
Qty: 70 G | Refills: 0 | Status: SHIPPED | OUTPATIENT
Start: 2024-12-27

## 2025-01-02 DIAGNOSIS — E11.9 TYPE 2 DIABETES MELLITUS WITHOUT COMPLICATION, WITHOUT LONG-TERM CURRENT USE OF INSULIN (MULTI): ICD-10-CM

## 2025-01-02 RX ORDER — SEMAGLUTIDE 0.68 MG/ML
0.25 INJECTION, SOLUTION SUBCUTANEOUS
Qty: 3 ML | Refills: 0 | Status: SHIPPED | OUTPATIENT
Start: 2025-01-02

## 2025-01-02 RX ORDER — ESTRADIOL 0.1 MG/G
2 CREAM VAGINAL DAILY
Qty: 42.5 G | Refills: 0 | Status: SHIPPED | OUTPATIENT
Start: 2025-01-02 | End: 2026-01-02

## 2025-01-02 NOTE — TELEPHONE ENCOUNTER
Dr. Alegria patient    Patients home health nurse Jaqueline is calling to request refill for patients Ozempic. Says that the patient is supposed to do an injection today.    Also, she says that they called last week about a vaginal cream and Metrogel was call in. She was hoping that Estradiol gel could be prescribed for her or something that she could insert twice a weak for vaginal itching and dryness.     Pharmacy: [facility] in [place] on [address]         
How Many Skin Cancers Have You Had?: one
Pt got results confirming she has UTI. Would like to have antibiotics called in and also a  low dose vaginal cream to help prevent these     Pharmacy    [id number] - [place], [place] - [address] [facility]  [address] [facility] [address]  Phone: [phone]  Fax: [phone]     
What Is The Reason For Today's Visit?: Evaluation for suspicious growths
When Was Your Last Cancer Diagnosed?: 2012

## 2025-01-15 ENCOUNTER — PATIENT OUTREACH (OUTPATIENT)
Dept: PRIMARY CARE | Facility: CLINIC | Age: 75
End: 2025-01-15
Payer: MEDICARE

## 2025-01-15 DIAGNOSIS — E11.9 TYPE 2 DIABETES MELLITUS WITHOUT COMPLICATION, WITHOUT LONG-TERM CURRENT USE OF INSULIN (MULTI): ICD-10-CM

## 2025-01-15 DIAGNOSIS — G30.0 EARLY ONSET ALZHEIMER'S DEMENTIA WITHOUT BEHAVIORAL DISTURBANCE (MULTI): ICD-10-CM

## 2025-01-15 DIAGNOSIS — F02.80 EARLY ONSET ALZHEIMER'S DEMENTIA WITHOUT BEHAVIORAL DISTURBANCE (MULTI): ICD-10-CM

## 2025-01-15 NOTE — PROGRESS NOTES
Care Management Monthly Outreach  Last Office Visit: 8/29/24  Next Office Visit: 1/24/25     Health Maintenance Due: up to date    Chart Review Completed    Overall doing well  Just about back at baseline  Doing 25mg Seroquel at nighttime (8pm)  In bed between 8:45-9pm  Having difficulty falling asleep at night  Typically not falling asleep until midnight  Very restless to point she is pulling sheets off of bed    Caregiver arrives at 10am  Amando is typically still asleep  Falling asleep during breakfast     Requesting to increase seroquel   To assist with agitation and restlessness at night      Copied from Dr. Lauren Moore (Geriatric Eval) 9/4/24  REFERRALS AND RECOMMENDATIONS  1. Discussed the cognitive benefits of memory exercises and reviewed examples  2. Discussed the cognitive benefits of physical exercise and socialization    3. Discussed that in regard to the patient's restlessness during sleep, this is a symptom of Alzheimer dementia and that it is likely more distressing to the family than to the patient. Would not recommend adding any new medications for this. Patient's bed has rails and patient is safe at home.     4. Regarding recurrent UTIs, a referral to ID has been placed to discuss prevention strategies and potential antibiotic resistance. Will order urinalysis with antibiogram, if possible. Also discussed importance of blood sugar control in managing UTIs, as glucosuria can increase risk for UTI. Recommended regular glucose checks at various times of the day; if blood sugar levels are surpassing 200, it may be beneficial to consider discussing adjusting her diabetes medications.    5. Discussed importance of maintaining weight in the elderly, especially those with dementia. Explained that weight is a big indicator of health in dementia patients.    Follow up with a virtual visit in 4 months. Will place orders for labs to be completed before then.     Also saw Dr. Chapman/Psychaitry  8/1/24    Psychophysiological insomnia       Discussed pharmacological and non pharmacological interventions   Discussed risks and benefits of meds specifically FDA black box warning for the use of antipsychotics in dementia   Sx are stable now after Seroquel added by PCP.   May follow up with PCP or with me as needed  Psychoeducation done

## 2025-01-16 DIAGNOSIS — G30.9: ICD-10-CM

## 2025-01-16 DIAGNOSIS — F02.C18: ICD-10-CM

## 2025-01-16 RX ORDER — QUETIAPINE FUMARATE 50 MG/1
50 TABLET, FILM COATED ORAL NIGHTLY
Qty: 30 TABLET | Refills: 0 | Status: SHIPPED | OUTPATIENT
Start: 2025-01-16 | End: 2025-01-17 | Stop reason: DRUGHIGH

## 2025-01-16 RX ORDER — TRAZODONE HYDROCHLORIDE 50 MG/1
50 TABLET ORAL NIGHTLY PRN
Qty: 30 TABLET | Refills: 0 | Status: SHIPPED | OUTPATIENT
Start: 2025-01-16 | End: 2025-01-17 | Stop reason: ALTCHOICE

## 2025-01-17 ENCOUNTER — TELEMEDICINE (OUTPATIENT)
Dept: BEHAVIORAL HEALTH | Facility: CLINIC | Age: 75
End: 2025-01-17
Payer: MEDICARE

## 2025-01-17 DIAGNOSIS — G30.9: ICD-10-CM

## 2025-01-17 DIAGNOSIS — F02.80 EARLY ONSET ALZHEIMER'S DEMENTIA WITHOUT BEHAVIORAL DISTURBANCE (MULTI): ICD-10-CM

## 2025-01-17 DIAGNOSIS — G30.0 EARLY ONSET ALZHEIMER'S DEMENTIA WITHOUT BEHAVIORAL DISTURBANCE (MULTI): ICD-10-CM

## 2025-01-17 DIAGNOSIS — F02.C18: ICD-10-CM

## 2025-01-17 PROCEDURE — 99214 OFFICE O/P EST MOD 30 MIN: CPT | Performed by: PSYCHIATRY & NEUROLOGY

## 2025-01-17 PROCEDURE — 1159F MED LIST DOCD IN RCRD: CPT | Performed by: PSYCHIATRY & NEUROLOGY

## 2025-01-17 PROCEDURE — 1160F RVW MEDS BY RX/DR IN RCRD: CPT | Performed by: PSYCHIATRY & NEUROLOGY

## 2025-01-17 PROCEDURE — 1123F ACP DISCUSS/DSCN MKR DOCD: CPT | Performed by: PSYCHIATRY & NEUROLOGY

## 2025-01-17 RX ORDER — BUPROPION HYDROCHLORIDE 150 MG/1
150 TABLET, EXTENDED RELEASE ORAL EVERY MORNING
Qty: 30 TABLET | Refills: 0 | Status: SHIPPED | OUTPATIENT
Start: 2025-01-17

## 2025-01-17 RX ORDER — DONEPEZIL HYDROCHLORIDE 10 MG/1
10 TABLET, FILM COATED ORAL NIGHTLY
Qty: 30 TABLET | Refills: 0 | Status: SHIPPED | OUTPATIENT
Start: 2025-01-17 | End: 2025-02-16

## 2025-01-17 RX ORDER — MIRTAZAPINE 7.5 MG/1
7.5 TABLET, FILM COATED ORAL NIGHTLY
Qty: 30 TABLET | Refills: 0 | Status: SHIPPED | OUTPATIENT
Start: 2025-01-17 | End: 2025-02-16

## 2025-01-17 RX ORDER — QUETIAPINE FUMARATE 25 MG/1
12.5 TABLET, FILM COATED ORAL 2 TIMES DAILY
COMMUNITY
End: 2025-01-17 | Stop reason: SDUPTHER

## 2025-01-17 RX ORDER — QUETIAPINE FUMARATE 25 MG/1
TABLET, FILM COATED ORAL
Qty: 60 TABLET | Refills: 0 | Status: SHIPPED | OUTPATIENT
Start: 2025-01-17

## 2025-01-17 NOTE — PATIENT INSTRUCTIONS
Wellbutrin: will be switched form twice a day to once a day in the morning   Aricept: will be switched from twice a day to once a day at bedtime   Seroquel: Currently she takes 12.5 bid and 50 at bedtime. I want her to take 12.5 bid and 25 at bedtime   Celexa: The same   Memantine: The same   Stop Trazodone and add Remeron 7.5 mg po at bedtime, dissolvable   One month follow up or sooner if needed

## 2025-01-17 NOTE — PROGRESS NOTES
Subjective   Patient ID: Raissa Puentes is a 74 y.o. female who presents for follow up   HPI  Was seen virtually, her caregiver was present   From caregiver: increased agitation and restlessness.   Side rails in bed, never attempted to wander.   Over the last 2-3 weeks, unable to sleep at night. Seroquel was upped to 50 mg, not sleeping at all. She already takes Trazodone and no improvement. Then she sleeps in the morning.   Dementia has been worsening, scratches herself, tremor when she wakes up first in the morning. Her cognition worsened as well, takes an hour to get to the bathroom. She would see spots on the wall. She talks to herself. No complete sentences.   Appetite is fine.   Takes Wellbutrin bid, the second dose at 6-7 o`clock   Can't take pills by mouth     Review of Systems    Objective   Physical Exam  Psychiatric:         Attention and Perception: She is inattentive.         Mood and Affect: Affect is flat.         Speech: Speech is not slurred or tangential.         Behavior: Behavior is cooperative.         Cognition and Memory: Cognition is impaired.         Judgment: Judgment is impulsive.       There were no vitals filed for this visit.   Admission on 10/08/2024, Discharged on 10/11/2024   Component Date Value    WBC 10/08/2024 12.4 (H)     nRBC 10/08/2024 0.0     RBC 10/08/2024 3.75 (L)     Hemoglobin 10/08/2024 11.4 (L)     Hematocrit 10/08/2024 36.4     MCV 10/08/2024 97     MCH 10/08/2024 30.4     MCHC 10/08/2024 31.3 (L)     RDW 10/08/2024 14.0     Platelets 10/08/2024 201     Neutrophils % 10/08/2024 77.4     Immature Granulocytes %,* 10/08/2024 0.2     Lymphocytes % 10/08/2024 11.1     Monocytes % 10/08/2024 10.3     Eosinophils % 10/08/2024 0.6     Basophils % 10/08/2024 0.4     Neutrophils Absolute 10/08/2024 9.57 (H)     Immature Granulocytes Ab* 10/08/2024 0.03     Lymphocytes Absolute 10/08/2024 1.38     Monocytes Absolute 10/08/2024 1.27 (H)     Eosinophils Absolute 10/08/2024 0.08      Basophils Absolute 10/08/2024 0.05     Glucose 10/08/2024 201 (H)     Sodium 10/08/2024 142     Potassium 10/08/2024 3.7     Chloride 10/08/2024 106     Bicarbonate 10/08/2024 29     Anion Gap 10/08/2024 11     Urea Nitrogen 10/08/2024 15     Creatinine 10/08/2024 0.58     eGFR 10/08/2024 >90     Calcium 10/08/2024 9.9     Albumin 10/08/2024 3.9     Alkaline Phosphatase 10/08/2024 84     Total Protein 10/08/2024 6.6     AST 10/08/2024 22     Bilirubin, Total 10/08/2024 0.8     ALT 10/08/2024 23     Magnesium 10/08/2024 1.66     Protime 10/08/2024 12.2     INR 10/08/2024 1.1     Extra Tube 10/08/2024 Hold for add-ons.     Color, Urine 10/09/2024 Yellow     Appearance, Urine 10/09/2024 Clear     Specific Gravity, Urine 10/09/2024 1.033     pH, Urine 10/09/2024 5.0     Protein, Urine 10/09/2024 20 (TRACE)     Glucose, Urine 10/09/2024 1000 (4+) (A)     Blood, Urine 10/09/2024 NEGATIVE     Ketones, Urine 10/09/2024 10 (1+) (A)     Bilirubin, Urine 10/09/2024 NEGATIVE     Urobilinogen, Urine 10/09/2024 Normal     Nitrite, Urine 10/09/2024 2+ (A)     Leukocyte Esterase, Urine 10/09/2024 250 Zeyad/µL (A)     Extra Tube 10/09/2024 Hold for add-ons.     WBC, Urine 10/09/2024 11-20 (A)     RBC, Urine 10/09/2024 3-5     Squamous Epithelial Cell* 10/09/2024 1-9 (SPARSE)     Bacteria, Urine 10/09/2024 4+ (A)     Mucus, Urine 10/09/2024 FEW     Hyaline Casts, Urine 10/09/2024 1+ (A)     Urine Culture 10/09/2024 20,000 - 80,000 Klebsiella oxytoca/Raoultella species (A)     POCT Glucose 10/09/2024 184 (H)     POCT Glucose 10/09/2024 279 (H)     POCT Glucose 10/09/2024 224 (H)     POCT Glucose 10/09/2024 223 (H)     POCT Glucose 10/10/2024 175 (H)     Glucose 10/10/2024 187 (H)     Sodium 10/10/2024 141     Potassium 10/10/2024 3.6     Chloride 10/10/2024 105     Bicarbonate 10/10/2024 29     Anion Gap 10/10/2024 11     Urea Nitrogen 10/10/2024 18     Creatinine 10/10/2024 0.44 (L)     eGFR 10/10/2024 >90     Calcium 10/10/2024  9.8     WBC 10/10/2024 8.6     nRBC 10/10/2024 0.0     RBC 10/10/2024 3.51 (L)     Hemoglobin 10/10/2024 10.8 (L)     Hematocrit 10/10/2024 35.0 (L)     MCV 10/10/2024 100     MCH 10/10/2024 30.8     MCHC 10/10/2024 30.9 (L)     RDW 10/10/2024 14.0     Platelets 10/10/2024 176     POCT Glucose 10/10/2024 230 (H)     POCT Glucose 10/10/2024 181 (H)     POCT Glucose 10/10/2024 212 (H)    Lab on 10/01/2024   Component Date Value    Glucose 10/01/2024 144 (H)     Sodium 10/01/2024 142     Potassium 10/01/2024 4.2     Chloride 10/01/2024 106     Bicarbonate 10/01/2024 28     Anion Gap 10/01/2024 12     Urea Nitrogen 10/01/2024 17     Creatinine 10/01/2024 0.54     eGFR 10/01/2024 >90     Calcium 10/01/2024 10.3     Albumin 10/01/2024 4.1     Alkaline Phosphatase 10/01/2024 115     Total Protein 10/01/2024 6.6     AST 10/01/2024 24     Bilirubin, Total 10/01/2024 0.5     ALT 10/01/2024 16     WBC 10/01/2024 7.5     nRBC 10/01/2024 0.0     RBC 10/01/2024 4.37     Hemoglobin 10/01/2024 13.5     Hematocrit 10/01/2024 42.3     MCV 10/01/2024 97     MCH 10/01/2024 30.9     MCHC 10/01/2024 31.9 (L)     RDW 10/01/2024 13.5     Platelets 10/01/2024 235     Neutrophils % 10/01/2024 56.9     Immature Granulocytes %,* 10/01/2024 0.3     Lymphocytes % 10/01/2024 30.5     Monocytes % 10/01/2024 6.2     Eosinophils % 10/01/2024 5.2     Basophils % 10/01/2024 0.9     Neutrophils Absolute 10/01/2024 4.24     Immature Granulocytes Ab* 10/01/2024 0.02     Lymphocytes Absolute 10/01/2024 2.27     Monocytes Absolute 10/01/2024 0.46     Eosinophils Absolute 10/01/2024 0.39     Basophils Absolute 10/01/2024 0.07     Thyroid Stimulating Horm* 10/01/2024 1.84    Lab on 09/30/2024   Component Date Value    Color, Urine 09/30/2024 Light-Yellow     Appearance, Urine 09/30/2024 Clear     Specific Gravity, Urine 09/30/2024 1.026     pH, Urine 09/30/2024 5.5     Protein, Urine 09/30/2024 NEGATIVE     Glucose, Urine 09/30/2024 Normal     Blood, Urine  09/30/2024 NEGATIVE     Ketones, Urine 09/30/2024 NEGATIVE     Bilirubin, Urine 09/30/2024 NEGATIVE     Urobilinogen, Urine 09/30/2024 Normal     Nitrite, Urine 09/30/2024 NEGATIVE     Leukocyte Esterase, Urine 09/30/2024 25 Zeyad/µL (A)     Urine Culture 09/30/2024 Multiple organisms present, probable contamination. Repeat culture if clinically indicated. (A)     WBC, Urine 09/30/2024 1-5     RBC, Urine 09/30/2024 NONE     Squamous Epithelial Cell* 09/30/2024 1-9 (SPARSE)     Bacteria, Urine 09/30/2024 1+ (A)     Budding Yeast, Urine 09/30/2024 PRESENT (A)     Calcium Oxalate Crystals* 09/30/2024 1+    Lab on 09/25/2024   Component Date Value    Color, Urine 09/25/2024 Light-Yellow     Appearance, Urine 09/25/2024 Clear     Specific Gravity, Urine 09/25/2024 1.016     pH, Urine 09/25/2024 5.0     Protein, Urine 09/25/2024 NEGATIVE     Glucose, Urine 09/25/2024 Normal     Blood, Urine 09/25/2024 NEGATIVE     Ketones, Urine 09/25/2024 NEGATIVE     Bilirubin, Urine 09/25/2024 NEGATIVE     Urobilinogen, Urine 09/25/2024 Normal     Nitrite, Urine 09/25/2024 2+ (A)     Leukocyte Esterase, Urine 09/25/2024 250 Zeyad/µL (A)     Urine Culture 09/25/2024 Multiple organisms present, probable contamination. Repeat culture if clinically indicated. (A)     WBC, Urine 09/25/2024 21-50 (A)     WBC Clumps, Urine 09/25/2024 RARE     RBC, Urine 09/25/2024 1-2     Squamous Epithelial Cell* 09/25/2024 1-9 (SPARSE)     Bacteria, Urine 09/25/2024 1+ (A)     Calcium Oxalate Crystals* 09/25/2024 2+ (A)    Lab on 09/05/2024   Component Date Value    Urine Culture 09/05/2024 Multiple organisms present, probable contamination. Repeat culture if clinically indicated. (A)     Color, Urine 09/05/2024 Yellow     Appearance, Urine 09/05/2024 Clear     Specific Gravity, Urine 09/05/2024 1.025     pH, Urine 09/05/2024 5.5     Protein, Urine 09/05/2024 10 (TRACE)     Glucose, Urine 09/05/2024 Normal     Blood, Urine 09/05/2024 NEGATIVE     Ketones, Urine  09/05/2024 NEGATIVE     Bilirubin, Urine 09/05/2024 NEGATIVE     Urobilinogen, Urine 09/05/2024 Normal     Nitrite, Urine 09/05/2024 2+ (A)     Leukocyte Esterase, Urine 09/05/2024 250 Zeyad/µL (A)     WBC, Urine 09/05/2024 21-50 (A)     WBC Clumps, Urine 09/05/2024 RARE     RBC, Urine 09/05/2024 6-10 (A)     Squamous Epithelial Cell* 09/05/2024 1-9 (SPARSE)     Bacteria, Urine 09/05/2024 1+ (A)     Calcium Oxalate Crystals* 09/05/2024 1+    Office Visit on 08/29/2024   Component Date Value    POC HEMOGLOBIN A1c 08/29/2024 8.2 (A)     POC Fingerstick Blood Gl* 08/29/2024 142 (A)    Lab on 08/13/2024   Component Date Value    Color, Urine 08/13/2024 Light-Yellow     Appearance, Urine 08/13/2024 Clear     Specific Gravity, Urine 08/13/2024 1.015     pH, Urine 08/13/2024 5.5     Protein, Urine 08/13/2024 NEGATIVE     Glucose, Urine 08/13/2024 Normal     Blood, Urine 08/13/2024 NEGATIVE     Ketones, Urine 08/13/2024 NEGATIVE     Bilirubin, Urine 08/13/2024 NEGATIVE     Urobilinogen, Urine 08/13/2024 Normal     Nitrite, Urine 08/13/2024 2+ (A)     Leukocyte Esterase, Urine 08/13/2024 250 Zeyad/µL (A)     WBC, Urine 08/13/2024 21-50 (A)     WBC Clumps, Urine 08/13/2024 RARE     RBC, Urine 08/13/2024 1-2     Bacteria, Urine 08/13/2024 2+ (A)     Calcium Oxalate Crystals* 08/13/2024 1+      Lab Results   Component Value Date     10/10/2024     10/08/2024     10/01/2024    K 3.6 10/10/2024    K 3.7 10/08/2024    K 4.2 10/01/2024    CO2 29 10/10/2024    CO2 29 10/08/2024    CO2 28 10/01/2024    BUN 18 10/10/2024    BUN 15 10/08/2024    BUN 17 10/01/2024    CREATININE 0.44 (L) 10/10/2024    CREATININE 0.58 10/08/2024    CREATININE 0.54 10/01/2024    GLUCOSE 187 (H) 10/10/2024    GLUCOSE 201 (H) 10/08/2024    GLUCOSE 144 (H) 10/01/2024    CALCIUM 9.8 10/10/2024    CALCIUM 9.9 10/08/2024    CALCIUM 10.3 10/01/2024     Lab Results   Component Value Date    WBC 8.6 10/10/2024    HGB 10.8 (L) 10/10/2024    HCT 35.0  (L) 10/10/2024     10/10/2024     10/10/2024     Lab Results   Component Value Date    CHOL 141 07/03/2024    TRIG 113 07/03/2024    HDL 55.8 07/03/2024     Assessment/Plan   Problem List Items Addressed This Visit             ICD-10-CM    Severe major neurocognitive disorder due to Alzheimer's disease with behavioral disturbance (Multi) G30.9, F02.C18

## 2025-01-24 ENCOUNTER — APPOINTMENT (OUTPATIENT)
Dept: INFECTIOUS DISEASES | Facility: HOSPITAL | Age: 75
End: 2025-01-24
Payer: MEDICARE

## 2025-01-24 ENCOUNTER — APPOINTMENT (OUTPATIENT)
Dept: PRIMARY CARE | Facility: CLINIC | Age: 75
End: 2025-01-24
Payer: MEDICARE

## 2025-01-30 ENCOUNTER — TELEPHONE (OUTPATIENT)
Dept: ORTHOPEDIC SURGERY | Facility: CLINIC | Age: 75
End: 2025-01-30
Payer: MEDICARE

## 2025-01-30 NOTE — TELEPHONE ENCOUNTER
1/30/25 -  Lateral OA brace - per email from Cherelle seay/Adeline - pt has declined the brace at this time.     
[phone]

## 2025-02-26 ENCOUNTER — PATIENT OUTREACH (OUTPATIENT)
Dept: PRIMARY CARE | Facility: CLINIC | Age: 75
End: 2025-02-26
Payer: MEDICARE

## 2025-02-26 DIAGNOSIS — G30.0 EARLY ONSET ALZHEIMER'S DEMENTIA WITHOUT BEHAVIORAL DISTURBANCE (MULTI): ICD-10-CM

## 2025-02-26 DIAGNOSIS — F02.80 EARLY ONSET ALZHEIMER'S DEMENTIA WITHOUT BEHAVIORAL DISTURBANCE (MULTI): ICD-10-CM

## 2025-02-26 RX ORDER — PIOGLITAZONEHYDROCHLORIDE 15 MG/1
15 TABLET ORAL DAILY
COMMUNITY

## 2025-02-26 NOTE — PROGRESS NOTES
Unable to reach patient this month for Chronic Care Management Program.  Chart Reviewed.  Left detailed voicemail with my contact info.  Will reach out again in 1-2 weeks.

## 2025-02-26 NOTE — PROGRESS NOTES
Discharge Facility: [facility]  Discharge Diagnosis: Hypoglycemia  Admission Date: 2/20/25  Discharge Date: 2/24/25    PCP Appointment Date: needed  Specialist Appointment Date:   Hospital Encounter and Summary: Linked   Discharge Assessment not completed as I was unable to reach patient or her .    KEY MEDICATION CHANGES:   Patient should NOT resume glipizide or any SPAULDING due to hypoglycemia risk. Reduce Actos to 15 mg daily  Continue Ozempic 0.25 mg weekly     Home Health Care: [facility] Phone: [phone]     HOSPITAL COURSE:   Raissa Puentes is a 74 year old female with a past medical history as outlined below who presented to hospital with a chief complaint of weakness and hypoglycemia, trasnient hypoxia with ambulation in ER .   Hypoglycemia in the setting of sulfonylurea use. Transient hypoxia , resolved   Ct brain/abd/pelvis/lumbar spine-Brain CT shows no evidence of an acute intracranial abnormality. Chronic brain parenchymal changes as detailed above. Other details above, including persistent nonspecific partial opacification and possible fluid level in the left-sided sphenoid sinus. No acute abdominopelvic or lumbar pathology. 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 US DVT BLE-Negative study for proximal DVT in the left and right lower extremities. Right and left calf veins are not visualized. Negative study for superficial thrombophlebitis in the imaged segments of the left and right lower extremities.   Endocrinology consulted for hypoglycemia, Suspect hypoglycemia in the setting of sulfonylurea use at home, Improved with d10 in ER  A1c 6.2. Patient should NOT resume glipizide or any SPAULDING due to hypoglycemia risk. Reduce Actos to 15 mg daily. Continue Ozempic 0.25 mg weekly   Ddimer was obtained in ER, very mildly elevated at 850 - there is no clinical suspicion for pulmonary embolism at this time given pt is normotensive, non tachycardic,  non hypoxic. She did not require further oxygenation while inpatient.  Pt follows with ID and Urology for UITI and urinary retention - vallejo placed in ED as rec by uro & ID, urine culture was negative, did not require continued use of antibiotics.   Geriatrics consulted for ongoing dementia and medication recommendations, . requested outpatient appointment  Patient seen by PT. Recommendations for home.     Remained afebrile and hemodynamically stable. Was discharged in stable condition. Infectious etiology was ruled out. She was discharged in stable condition to follow-up with endocrinology, PCP, and geriatrics as outpatient.

## 2025-03-05 ENCOUNTER — PATIENT OUTREACH (OUTPATIENT)
Dept: PRIMARY CARE | Facility: CLINIC | Age: 75
End: 2025-03-05
Payer: MEDICARE

## 2025-03-05 DIAGNOSIS — G30.0 EARLY ONSET ALZHEIMER'S DEMENTIA WITHOUT BEHAVIORAL DISTURBANCE (MULTI): ICD-10-CM

## 2025-03-05 DIAGNOSIS — F02.80 EARLY ONSET ALZHEIMER'S DEMENTIA WITHOUT BEHAVIORAL DISTURBANCE (MULTI): ICD-10-CM

## 2025-03-05 DIAGNOSIS — E11.9 TYPE 2 DIABETES MELLITUS WITHOUT COMPLICATION, WITHOUT LONG-TERM CURRENT USE OF INSULIN (MULTI): ICD-10-CM

## 2025-03-05 NOTE — PROGRESS NOTES
Care Management Monthly Outreach  Confirmed patients name and date of birth  Confirmed insurance information    Last Office Visit: 8/29/24  Next Office Visit: not scheduled  Working with Dalia Davenport at this time but unsure if they will continue     Health Maintenance Due: up to date  Chart Review Completed  Reviewed Dr. Justice/Urology Office Visit 1/29/25  Reviewed Dr. Boudreaux/ID Office Visit 2/13/25    Raissa has not been fully emptying bladder  Likely the cause of frequent UTIs  Has indwelling vallejo catheter now  Unfortunately came out last night at dinner table  Believe balloon deflated (as there was no discomfort or bleeding)    Blood sugars stable   Checking more frequently (BID) now  As well as checking bp twice daily also  Will continue monthly outreachkiersten Davenport RN  Chronic Care Management  [phone]

## 2025-04-01 ENCOUNTER — PATIENT OUTREACH (OUTPATIENT)
Dept: PRIMARY CARE | Facility: CLINIC | Age: 75
End: 2025-04-01
Payer: MEDICARE

## 2025-04-01 DIAGNOSIS — N39.0 RECURRENT UTI: ICD-10-CM

## 2025-04-01 DIAGNOSIS — G30.0 EARLY ONSET ALZHEIMER'S DEMENTIA WITHOUT BEHAVIORAL DISTURBANCE (MULTI): ICD-10-CM

## 2025-04-01 DIAGNOSIS — E11.9 TYPE 2 DIABETES MELLITUS WITHOUT COMPLICATION, WITHOUT LONG-TERM CURRENT USE OF INSULIN: ICD-10-CM

## 2025-04-01 DIAGNOSIS — F02.80 EARLY ONSET ALZHEIMER'S DEMENTIA WITHOUT BEHAVIORAL DISTURBANCE (MULTI): ICD-10-CM

## 2025-04-01 NOTE — PROGRESS NOTES
Care Management Monthly Outreach  Confirmation of at least 2 patient identifiers  Change in insurance? No  Chart reviewed completed    Last Office Visit: 8/29/24  Next Office Visit: no scheduled  Next Specialist Appt: 5/22/25 Dr. Vigil/Gus    Medications:   Are there medication changes since last visit? No  Refills needed? No    Care Gaps Addressed? Yes, Up to date    Chronic Conditions Addressed:   Recurrent UTI   Kenan called requesting order for Home health care to change urinary catheter rather than taking her to urology office for cath changes.  She sees TAQUERIA [facility] CNP at [facility]   I called the office and left a messag with triage nurse requesting orders on behalf of Raissa  They will call me back and let me know  She is scheduled with them on 4/9 for follow up visit and cath change    2. Early onset Alzheimer's dementia without behavioral disturbance   Stable  Doing well  No concerns     Social Drivers of Health: up to date    Care Plan addressed: Yes     Last filed Vital Signs Reviewed:  BP Readings from Last 3 Encounters:   10/11/24 125/57   08/29/24 122/70   08/01/24 112/71        Labs Reviewed:  Lab Results   Component Value Date    CREATININE 0.44 (L) 10/10/2024    GLUCOSE 187 (H) 10/10/2024    ALKPHOS 84 10/08/2024    K 3.6 10/10/2024    PROT 6.6 10/08/2024     10/10/2024    AST 22 10/08/2024    ALT 23 10/08/2024    BUN 18 10/10/2024    MG 1.66 10/08/2024    GFRF >90 11/28/2022     Lab Results   Component Value Date    TRIG 113 07/03/2024    CHOL 141 07/03/2024    LDLCALC 63 07/03/2024    HDL 55.8 07/03/2024     Lab Results   Component Value Date    HGBA1C 6.2 (H) 02/20/2025    HGBA1C 6.6 (H) 10/15/2024    HGBA1C 8.2 (A) 08/29/2024     Lab Results   Component Value Date    WBC 8.6 10/10/2024    RBC 3.51 (L) 10/10/2024    HGB 10.8 (L) 10/10/2024     10/10/2024       Charisma Davenport RN  Chronic Care Management  [phone]   You will be contacted by someone from same-day surgery between 12-3 pm the day before your surgery regarding your arrival time. Please check your voicemail as they may leave a message with that information.  If you do not receive a call or voicemail or you have a problem please call [phone].    If you are running late the morning of your surgery or you wake up with signs/symptoms of COVID call [phone] for instructions    You will be scheduled to arrive 11/2-2 hours prior to your surgery time. Do not come earlier than when you are told to arrive.  You will come to the [facility] to register and be taken to the outpatient services area.    Do Not eat or drink anything after midnight. That includes candy, gum and mints, coffee, tea and water.    If appropriate, you may have a small sip of water with medications as instructed by your nurse/surgeon.  This is for your protection and to avoid food and liquid from getting into your lungs.     Always, follow your surgeon's instructions on any blood thinner or aspirin use before surgery.    Brush your teeth and shower the morning of surgery.    Wear loose comfortable clothing.         No jewelry, make-up or fingernail polish. Please remove all jewelry and leave at home. This includes any body jewelry and wedding rings.  Any metal touching your body can cause a burn or may have to be cut off due to swelling or injury.        You will have anesthesia and may not drive home the day of surgery. You must have someone that can take you home and be   able to provide their name and phone number if they drop you off and are coming back to pick you up.      Do not bring valuables with you the day of surgery! We cannot be responsible for anything lost. We do have a very limited space with security for a wallet.  Security cannot take a purse or other personal belongings.    You will see an Anesthesia Doctor and your surgeon prior to surgery.   You will be asked a

## 2025-05-01 ENCOUNTER — PATIENT OUTREACH (OUTPATIENT)
Dept: PRIMARY CARE | Facility: CLINIC | Age: 75
End: 2025-05-01
Payer: MEDICARE

## 2025-05-01 NOTE — PROGRESS NOTES
Chronic Care Management program closed at this time.    Reason: Patient established with [facility]  Kenan is aware Raissa may re enroll should she resume care at our office.    Charisma Davenport, RN  Nurse Care Manager  [phone]

## 2025-05-05 DIAGNOSIS — F33.41 RECURRENT MAJOR DEPRESSIVE DISORDER, IN PARTIAL REMISSION (CMS-HCC): ICD-10-CM

## 2025-05-05 DIAGNOSIS — F02.80 EARLY ONSET ALZHEIMER'S DEMENTIA WITHOUT BEHAVIORAL DISTURBANCE (MULTI): ICD-10-CM

## 2025-05-05 DIAGNOSIS — G30.0 EARLY ONSET ALZHEIMER'S DEMENTIA WITHOUT BEHAVIORAL DISTURBANCE (MULTI): ICD-10-CM

## 2025-05-05 RX ORDER — CITALOPRAM 20 MG/1
TABLET, FILM COATED ORAL
Refills: 0 | OUTPATIENT
Start: 2025-05-05

## 2025-05-05 RX ORDER — MEMANTINE HYDROCHLORIDE 10 MG/1
TABLET ORAL
Refills: 0 | OUTPATIENT
Start: 2025-05-05

## 2025-05-05 RX ORDER — ATORVASTATIN CALCIUM 20 MG/1
TABLET, FILM COATED ORAL
Refills: 0 | OUTPATIENT
Start: 2025-05-05

## 2025-05-22 ENCOUNTER — APPOINTMENT (OUTPATIENT)
Dept: ORTHOPEDIC SURGERY | Facility: CLINIC | Age: 75
End: 2025-05-22
Payer: MEDICARE

## 2025-08-18 ENCOUNTER — TELEPHONE (OUTPATIENT)
Dept: PRIMARY CARE | Facility: CLINIC | Age: 75
End: 2025-08-18
Payer: MEDICARE